# Patient Record
Sex: MALE | Race: WHITE | Employment: FULL TIME | URBAN - METROPOLITAN AREA
[De-identification: names, ages, dates, MRNs, and addresses within clinical notes are randomized per-mention and may not be internally consistent; named-entity substitution may affect disease eponyms.]

---

## 2024-03-29 ENCOUNTER — APPOINTMENT (OUTPATIENT)
Dept: GENERAL RADIOLOGY | Facility: HOSPITAL | Age: 54
End: 2024-03-29
Payer: COMMERCIAL

## 2024-03-29 ENCOUNTER — HOSPITAL ENCOUNTER (INPATIENT)
Facility: HOSPITAL | Age: 54
LOS: 3 days | Discharge: HOME | End: 2024-04-01
Attending: EMERGENCY MEDICINE | Admitting: INTERNAL MEDICINE
Payer: COMMERCIAL

## 2024-03-29 ENCOUNTER — APPOINTMENT (OUTPATIENT)
Dept: CT IMAGING | Facility: HOSPITAL | Age: 54
End: 2024-03-29
Attending: EMERGENCY MEDICINE
Payer: COMMERCIAL

## 2024-03-29 ENCOUNTER — APPOINTMENT (OUTPATIENT)
Dept: ULTRASOUND IMAGING | Facility: HOSPITAL | Age: 54
End: 2024-03-29
Attending: STUDENT IN AN ORGANIZED HEALTH CARE EDUCATION/TRAINING PROGRAM
Payer: COMMERCIAL

## 2024-03-29 DIAGNOSIS — R09.02 HYPOXIA: ICD-10-CM

## 2024-03-29 DIAGNOSIS — I26.99 OTHER ACUTE PULMONARY EMBOLISM WITHOUT ACUTE COR PULMONALE (HCC): Primary | ICD-10-CM

## 2024-03-29 LAB
ALBUMIN SERPL-MCNC: 3.1 G/DL (ref 3.4–5)
ALBUMIN/GLOB SERPL: 0.6 {RATIO} (ref 1–2)
ALP LIVER SERPL-CCNC: 60 U/L
ALT SERPL-CCNC: 23 U/L
ANION GAP SERPL CALC-SCNC: 6 MMOL/L (ref 0–18)
APTT PPP: 28.9 SECONDS (ref 23.3–35.6)
APTT PPP: 55.2 SECONDS (ref 23.3–35.6)
AST SERPL-CCNC: 20 U/L (ref 15–37)
BASOPHILS # BLD AUTO: 0.03 X10(3) UL (ref 0–0.2)
BASOPHILS NFR BLD AUTO: 0.4 %
BILIRUB SERPL-MCNC: 1.1 MG/DL (ref 0.1–2)
BUN BLD-MCNC: 9 MG/DL (ref 9–23)
CALCIUM BLD-MCNC: 8.9 MG/DL (ref 8.5–10.1)
CHLORIDE SERPL-SCNC: 106 MMOL/L (ref 98–112)
CO2 SERPL-SCNC: 29 MMOL/L (ref 21–32)
CREAT BLD-MCNC: 1.05 MG/DL
D DIMER PPP FEU-MCNC: 8.23 UG/ML FEU (ref ?–0.52)
EGFRCR SERPLBLD CKD-EPI 2021: 85 ML/MIN/1.73M2 (ref 60–?)
EOSINOPHIL # BLD AUTO: 0.23 X10(3) UL (ref 0–0.7)
EOSINOPHIL NFR BLD AUTO: 2.9 %
ERYTHROCYTE [DISTWIDTH] IN BLOOD BY AUTOMATED COUNT: 18.4 %
GLOBULIN PLAS-MCNC: 4.9 G/DL (ref 2.8–4.4)
GLUCOSE BLD-MCNC: 134 MG/DL (ref 70–99)
HCT VFR BLD AUTO: 38.5 %
HGB BLD-MCNC: 12.2 G/DL
IMM GRANULOCYTES # BLD AUTO: 0.04 X10(3) UL (ref 0–1)
IMM GRANULOCYTES NFR BLD: 0.5 %
LYMPHOCYTES # BLD AUTO: 1.52 X10(3) UL (ref 1–4)
LYMPHOCYTES NFR BLD AUTO: 19.2 %
MCH RBC QN AUTO: 25.1 PG (ref 26–34)
MCHC RBC AUTO-ENTMCNC: 31.7 G/DL (ref 31–37)
MCV RBC AUTO: 79.1 FL
MONOCYTES # BLD AUTO: 0.47 X10(3) UL (ref 0.1–1)
MONOCYTES NFR BLD AUTO: 5.9 %
NEUTROPHILS # BLD AUTO: 5.63 X10 (3) UL (ref 1.5–7.7)
NEUTROPHILS # BLD AUTO: 5.63 X10(3) UL (ref 1.5–7.7)
NEUTROPHILS NFR BLD AUTO: 71.1 %
NT-PROBNP SERPL-MCNC: 141 PG/ML (ref ?–125)
OSMOLALITY SERPL CALC.SUM OF ELEC: 293 MOSM/KG (ref 275–295)
PLATELET # BLD AUTO: 220 10(3)UL (ref 150–450)
POTASSIUM SERPL-SCNC: 3.6 MMOL/L (ref 3.5–5.1)
PROT SERPL-MCNC: 8 G/DL (ref 6.4–8.2)
RBC # BLD AUTO: 4.87 X10(6)UL
SODIUM SERPL-SCNC: 141 MMOL/L (ref 136–145)
TROPONIN I SERPL HS-MCNC: 6 NG/L
WBC # BLD AUTO: 7.9 X10(3) UL (ref 4–11)

## 2024-03-29 PROCEDURE — 93005 ELECTROCARDIOGRAM TRACING: CPT

## 2024-03-29 PROCEDURE — 93010 ELECTROCARDIOGRAM REPORT: CPT

## 2024-03-29 PROCEDURE — 85730 THROMBOPLASTIN TIME PARTIAL: CPT | Performed by: INTERNAL MEDICINE

## 2024-03-29 PROCEDURE — 80053 COMPREHEN METABOLIC PANEL: CPT | Performed by: EMERGENCY MEDICINE

## 2024-03-29 PROCEDURE — 85730 THROMBOPLASTIN TIME PARTIAL: CPT | Performed by: EMERGENCY MEDICINE

## 2024-03-29 PROCEDURE — 84484 ASSAY OF TROPONIN QUANT: CPT | Performed by: EMERGENCY MEDICINE

## 2024-03-29 PROCEDURE — 96374 THER/PROPH/DIAG INJ IV PUSH: CPT

## 2024-03-29 PROCEDURE — 71045 X-RAY EXAM CHEST 1 VIEW: CPT | Performed by: EMERGENCY MEDICINE

## 2024-03-29 PROCEDURE — 99285 EMERGENCY DEPT VISIT HI MDM: CPT

## 2024-03-29 PROCEDURE — 85379 FIBRIN DEGRADATION QUANT: CPT | Performed by: PHYSICIAN ASSISTANT

## 2024-03-29 PROCEDURE — 94660 CPAP INITIATION&MGMT: CPT

## 2024-03-29 PROCEDURE — 5A09357 ASSISTANCE WITH RESPIRATORY VENTILATION, LESS THAN 24 CONSECUTIVE HOURS, CONTINUOUS POSITIVE AIRWAY PRESSURE: ICD-10-PCS | Performed by: INTERNAL MEDICINE

## 2024-03-29 PROCEDURE — 71275 CT ANGIOGRAPHY CHEST: CPT | Performed by: EMERGENCY MEDICINE

## 2024-03-29 PROCEDURE — 83880 ASSAY OF NATRIURETIC PEPTIDE: CPT | Performed by: EMERGENCY MEDICINE

## 2024-03-29 PROCEDURE — 85025 COMPLETE CBC W/AUTO DIFF WBC: CPT | Performed by: EMERGENCY MEDICINE

## 2024-03-29 RX ORDER — SENNOSIDES 8.6 MG
17.2 TABLET ORAL NIGHTLY PRN
Status: DISCONTINUED | OUTPATIENT
Start: 2024-03-29 | End: 2024-04-01

## 2024-03-29 RX ORDER — ONDANSETRON 2 MG/ML
4 INJECTION INTRAMUSCULAR; INTRAVENOUS EVERY 4 HOURS PRN
Status: ACTIVE | OUTPATIENT
Start: 2024-03-29 | End: 2024-03-29

## 2024-03-29 RX ORDER — MORPHINE SULFATE 2 MG/ML
2 INJECTION, SOLUTION INTRAMUSCULAR; INTRAVENOUS EVERY 2 HOUR PRN
Status: DISCONTINUED | OUTPATIENT
Start: 2024-03-29 | End: 2024-04-01

## 2024-03-29 RX ORDER — PROCHLORPERAZINE EDISYLATE 5 MG/ML
5 INJECTION INTRAMUSCULAR; INTRAVENOUS EVERY 8 HOURS PRN
Status: DISCONTINUED | OUTPATIENT
Start: 2024-03-29 | End: 2024-04-01

## 2024-03-29 RX ORDER — HEPARIN SODIUM 1000 [USP'U]/ML
80 INJECTION, SOLUTION INTRAVENOUS; SUBCUTANEOUS ONCE
Status: COMPLETED | OUTPATIENT
Start: 2024-03-29 | End: 2024-03-29

## 2024-03-29 RX ORDER — ACETAMINOPHEN 500 MG
500 TABLET ORAL EVERY 4 HOURS PRN
Status: DISCONTINUED | OUTPATIENT
Start: 2024-03-29 | End: 2024-04-01

## 2024-03-29 RX ORDER — BISACODYL 10 MG
10 SUPPOSITORY, RECTAL RECTAL
Status: DISCONTINUED | OUTPATIENT
Start: 2024-03-29 | End: 2024-04-01

## 2024-03-29 RX ORDER — ONDANSETRON 2 MG/ML
4 INJECTION INTRAMUSCULAR; INTRAVENOUS EVERY 6 HOURS PRN
Status: DISCONTINUED | OUTPATIENT
Start: 2024-03-29 | End: 2024-04-01

## 2024-03-29 RX ORDER — POTASSIUM CHLORIDE 20 MEQ/1
40 TABLET, EXTENDED RELEASE ORAL EVERY 4 HOURS
Status: COMPLETED | OUTPATIENT
Start: 2024-03-29 | End: 2024-03-30

## 2024-03-29 RX ORDER — ASPIRIN 81 MG/1
324 TABLET, CHEWABLE ORAL ONCE
Status: COMPLETED | OUTPATIENT
Start: 2024-03-29 | End: 2024-03-29

## 2024-03-29 RX ORDER — MORPHINE SULFATE 4 MG/ML
4 INJECTION, SOLUTION INTRAMUSCULAR; INTRAVENOUS EVERY 2 HOUR PRN
Status: DISCONTINUED | OUTPATIENT
Start: 2024-03-29 | End: 2024-04-01

## 2024-03-29 RX ORDER — POLYETHYLENE GLYCOL 3350 17 G/17G
17 POWDER, FOR SOLUTION ORAL DAILY PRN
Status: DISCONTINUED | OUTPATIENT
Start: 2024-03-29 | End: 2024-04-01

## 2024-03-29 RX ORDER — SODIUM CHLORIDE 9 MG/ML
INJECTION, SOLUTION INTRAVENOUS CONTINUOUS
Status: ACTIVE | OUTPATIENT
Start: 2024-03-29 | End: 2024-03-29

## 2024-03-29 RX ORDER — ENEMA 19; 7 G/133ML; G/133ML
1 ENEMA RECTAL ONCE AS NEEDED
Status: DISCONTINUED | OUTPATIENT
Start: 2024-03-29 | End: 2024-04-01

## 2024-03-29 RX ORDER — MORPHINE SULFATE 2 MG/ML
1 INJECTION, SOLUTION INTRAMUSCULAR; INTRAVENOUS EVERY 2 HOUR PRN
Status: DISCONTINUED | OUTPATIENT
Start: 2024-03-29 | End: 2024-04-01

## 2024-03-29 RX ORDER — HEPARIN SODIUM AND DEXTROSE 10000; 5 [USP'U]/100ML; G/100ML
18 INJECTION INTRAVENOUS ONCE
Status: COMPLETED | OUTPATIENT
Start: 2024-03-29 | End: 2024-03-29

## 2024-03-29 RX ORDER — HEPARIN SODIUM AND DEXTROSE 10000; 5 [USP'U]/100ML; G/100ML
INJECTION INTRAVENOUS CONTINUOUS
Status: DISCONTINUED | OUTPATIENT
Start: 2024-03-29 | End: 2024-03-31

## 2024-03-29 NOTE — CONSULTS
[facility] Cardiology  Consultation Note      Faustino Bell Patient Status:  Emergency    1970 MRN [id number]   [facility] EMERGENCY DEPARTMENT Attending Costa Iqbal MD   Hosp Day # 0 PCP Arya Ojeda MD     Impression:  1. SOB x 2 wk--> acute pulmonary embolism  - CTA (3/29/24): bilateral distal main PA--> segmental branches.    - ECG: SR with RV strain, HS trop negative, pBNP <200  2. recent COVID infection  3. morbid obesity, LALO      Recommendations:  - PE: reviewed diagnosis; no signs of sub-massive/massive consequences, although there does appear to be a fair burden of clot bilaterally. Defer system or catheter-directed lytics at this time.   - TTE ordered.  - AC: heparin gtt--> DOAC on dc.   - hx notable for \"clotting disorder\" in mother--> hematology consultation for hypercoagulability work-up        History of Present Illness:  Faustino Bell is a 53 year old  seen in cardiology consultation for acute PE.  Hx morbid obesity, LALO.  Recent COVID infection 3-4 weeks ago; presents with 2 weeks of marked exertional dyspnea, most notable climbing stairs, today, accompanying dizziness. CTA demonstrates bilateral distal main PA--> segmental branches.  pBNP <200, HS trop negative, ECG SR without RV strain. Initially hypoxia 83% RA--> stabilized on nasal cannula.  Currently, comfortable at rest.  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      Medications:  Current Facility-Administered Medications   Medication Dose Route Frequency    heparin (Porcine) 1000 UNIT/ML injection - BOLUS IV 10,100 Units  80 Units/kg (Adjusted) Intravenous Once    heparin (Porcine) 53014 units/250 mL infusion ED (PE/DVT/THROMBUS) INITIAL DOSE  18 Units/kg/hr (Adjusted) Intravenous Once    heparin (Porcine) 64773 units/250mL infusion PE/DVT/THROMBUS CONTINUOUS  200-3,000 Units/hr Intravenous Continuous       Past Medical History:   Diagnosis  Date    Hypothyroid     Obstructive apnea 2021    Tameka HST AHI 33    Unspecified sleep apnea        Past Surgical History:   Procedure Laterality Date    HERNIA SURGERY         Family History  family history includes Diabetes in his maternal aunt, maternal grandmother, maternal uncle, and sister; Heart Disorder in his father; Hypertension in his father; renal failure in his father.    Social History   reports that he has never smoked. He has never used smokeless tobacco. He reports current drug use. He reports that he does not drink alcohol.     Allergies  No Known Allergies      Review of Systems:  Constitutional: negative for fevers  Eyes: negative for visual disturbance  Ears, nose, mouth, throat, and face: negative for epistaxis  Respiratory: negative for dyspnea on exertion  Cardiovascular: negative for chest pain  Gastrointestinal: negative for melena  Genitourinary:negative for hematuria  Hematologic/lymphatic: negative for bleeding  Musculoskeletal:negative for myalgias  Neurological: negative for dizziness and headaches  Endocrine: negative for temperature intolerance      Physical Exam:  Blood pressure 138/89, pulse 85, temperature 98.9 °F (37.2 °C), temperature source Temporal, resp. rate 23, height 73\", weight (!) 432 lb (196 kg), SpO2 95%.  Temp (24hrs), Av.9 °F (37.2 °C), Min:98.9 °F (37.2 °C), Max:98.9 °F (37.2 °C)    Wt Readings from Last 3 Encounters:   24 (!) 432 lb (196 kg)   21 (!) 470 lb (213.2 kg)   21 (!) 470 lb (213.2 kg)       General: Awake and alert; in no acute distress  HEENT: Extraocular movements are intact; sclerae are anicteric; scalp is atrauamatic; no thyromegaly  Neck: Supple; no JVD; no carotid bruits  Cardiac: Regular rate and regular rhythm; no murmurs/rubs/gallops are appreciated; PMI is non-displaced; there is no evidence of a sternal heave  Lungs: Clear to auscultation bilaterally; no accessory muscle use is noted  Abdomen: Soft, non-tender; bowel  sounds are normoactive; no hepatosplenomegaly  Extremities: No clubbing or cyanosis; moves all 4 extremities normally  Psychiatric: Normal mood and affect; answers questions appropriately  Dermatologic: No rashes; normal skin turgor    Diagnostic testing:    EKG: Normal sinus rhythm    Labs:   No results found for: \"PT\", \"INR\"     Lab Results   Component Value Date    WBC 7.9 03/29/2024    HGB 12.2 03/29/2024    HCT 38.5 03/29/2024    .0 03/29/2024    CREATSERUM 1.05 03/29/2024    BUN 9 03/29/2024     03/29/2024    K 3.6 03/29/2024     03/29/2024    CO2 29.0 03/29/2024     03/29/2024    CA 8.9 03/29/2024    ALB 3.1 03/29/2024    ALKPHO 60 03/29/2024    BILT 1.1 03/29/2024    TP 8.0 03/29/2024    AST 20 03/29/2024    ALT 23 03/29/2024    DDIMER 8.23 03/29/2024         Thank you for allowing our practice to participate in the care of your patient. Please do not hesitate to contact me if you have any questions.    Ye Penn MD  3/29/2024  4:53 PM

## 2024-03-29 NOTE — ED QUICK NOTES
Orders for admission, patient is aware of plan and ready to go upstairs. Any questions, please call ED RN Marlene at extension [phone].     Patient Covid vaccination status: Fully vaccinated     COVID Test Ordered in ED: None    COVID Suspicion at Admission: N/A    Running Infusions:    continuous dose heparin      23ml/hr    Mental Status/LOC at time of transport: a/ox4    Other pertinent information: none  CIWA score: N/A   NIH score:  N/A

## 2024-03-29 NOTE — H&P
[facility] Hospitalist H&P       CC:   Chief Complaint   Patient presents with    Shortness Of Breath    Chest Pain Angina        PCP: Arya Ojeda MD    History of Present Illness: Patient is a 53 year old male with PMH sig for obstructive sleep apnea on CPAP, hypothyroidism, morbid obesity who presented to the hospital for evaluation of shortness of breath.  Patient had COVID infection approximately 4 weeks ago.  He recovered without needing any medical intervention.  However, for the past week or so he has been feeling short of breath with minimal exertion which was unusual for him.  He normally has lymphedema and swelling in his legs but denies any calf pain or pain in his legs with walking.  No previous history of DVT or PE.  Denies any fevers chills chest pain palpitations at this time.  In the ER, patient's vitals were stable.  Labs with elevated dimer and slightly elevated proBNP.  CTA chest suggestive of bilateral PE.  Patient was started on heparin drip and admitted to the hospital for evaluation.  When I evaluated the patient in the ER, he was hemodynamically stable.  Laying flat, on room air, denied any chest pain.  Wife at bedside.      PMH  Past Medical History:   Diagnosis Date    Hypothyroid     Obstructive apnea 12/19/2021    [place] HST AHI 33    Unspecified sleep apnea         PSH  Past Surgical History:   Procedure Laterality Date    HERNIA SURGERY          ALL:  No Known Allergies     Home Medications:  No outpatient medications have been marked as taking for the 3/29/24 encounter (Hospital Encounter).         Soc Hx  Social History     Tobacco Use    Smoking status: Never    Smokeless tobacco: Never   Substance Use Topics    Alcohol use: No        Fam Hx  Family History   Problem Relation Age of Onset    Hypertension Father     Heart Disorder Father         MI final event    Other (renal failure) Father     Diabetes Maternal Grandmother     Diabetes Maternal Uncle     Diabetes Maternal Aunt      Diabetes Sister        Review of Systems  Comprehensive ROS reviewed and negative except for what's stated above.     OBJECTIVE:  /89   Pulse 85   Temp 98.9 °F (37.2 °C) (Temporal)   Resp 23   Ht 6' 1\" (1.854 m)   Wt (!) 432 lb (196 kg)   SpO2 95%   BMI 57.00 kg/m²   General:  Alert, no distress, morbidly obese gentleman in no acute distress   Head:  Normocephalic, without obvious abnormality, atraumatic.   Eyes:  Sclera anicteric, No conjunctival pallor, EOMs intact.    Nose: Nares normal. Septum midline. Mucosa normal. No drainage.   Throat: Lips, mucosa, and tongue normal. Teeth and gums normal.   Neck: Supple, symmetrical, trachea midline,   Lungs:   Clear to auscultation bilaterally. Normal effort   Chest wall:  No tenderness or deformity.   Heart:  Regular rate and rhythm, S1, S2 normal, no murmur, rub or gallop appreciated   Abdomen:   Soft, non-tender. Bowel sounds normal. No masses,  Non distended   Extremities: Extremities with venous static changes and lymphedema, no calf tenderness   Skin: Skin is dry and lower extremities   Neurologic: Normal strength, no focal deficit appreciated     Diagnostic Data:    CBC/Chem  Recent Labs   Lab 03/29/24  1409   WBC 7.9   HGB 12.2*   MCV 79.1*   .0       Recent Labs   Lab 03/29/24  1409      K 3.6      CO2 29.0   BUN 9   CREATSERUM 1.05   *   CA 8.9       Recent Labs   Lab 03/29/24  1409   ALT 23   AST 20   ALB 3.1*       No results for input(s): \"TROP\" in the last 168 hours.    CXR: image personally reviewed     Radiology: CT ANGIOGRAPHY, CHEST (CPT=71275)    Result Date: 3/29/2024  CONCLUSION:  1. Bilateral pulmonary emboli as detailed above.  Critical findings discussed with the ED MD, Dr. Sands's at approximately 1625 hours on 3/29/2024. Read back performed.     LOCATION:  [facility]   Dictated by (CST): Julianna Freeman MD on 3/29/2024 at 4:24 PM     Finalized by (CST): Julianna Freeman MD on 3/29/2024 at 4:36 PM       XR CHEST AP  PORTABLE  (CPT=71045)    Result Date: 3/29/2024  CONCLUSION:  Stable cardiac enlargement with mild interstitial opacities suggestive of mild edema.   LOCATION:  [facility]      Dictated by (CST): Bashir Maldonado MD on 3/29/2024 at 1:45 PM     Finalized by (CST): Bashir Maldonado MD on 3/29/2024 at 1:45 PM          ASSESSMENT / PLAN:      Patient is a 53 year old male with PMH sig for obstructive sleep apnea on CPAP, hypothyroidism, morbid obesity who presented to the hospital for evaluation of shortness of breath.     Dyspnea  Acute bilateral pulmonary embolism  - Likely provoked in the setting of recent COVID 19 infection  - CT with bilateral pulmonary emboli  - proBNP 141, troponin negative  - Currently hemodynamically stable, on room air  - Heparin drip  - 2D echo  - Venous Dopplers bilateral  - Pulmonology and cardiology on consult, appreciate recommendations    LALO  - On CPAP    Morbid obesity  - BMI 57  - Weight loss endeavors outpatient    FN:  - IVF: None  - Diet: Cardiac    DVT Prophy: SCD  Atrophy: Ambulate as tolerated  Lines: PIV    Dispo: Admit with telemetry  Outpatient records or previous hospital records reviewed.     Further recommendations pending patient's clinical course.  [facility] hospitalist to continue to follow patient while in house    Patient and/or patient's family given opportunity to ask questions and note understanding and agreeing with therapeutic plan as outlined    Thank You,  DO Marcy Ahmadi Hospitalist  Answering Service number: [phone]

## 2024-03-29 NOTE — ED PROVIDER NOTES
Patient Seen in: [facility] Emergency Department      History     Chief Complaint   Patient presents with    Shortness Of Breath    Chest Pain Angina     Stated Complaint: sob x 1 week, 83% on RA    Subjective:   HPI     this is a 53-year-old male who has a history of obstructive sleep apnea he said he who has had a recent COVID 4 weeks ago.  He felt better and now he has increasing shortness of breath.  He states every time he walks more than a few feet then he gets really short of breath and he gets chest discomfort.  The patient states that he has had no fevers recently.  He has had no wheezing.  He states that he does have sleep apnea and is on no oxygen at during the day.  He states that he is increasingly short of breath over the last couple days.  He does have history hypothyroidism he is got a history of sleep apnea.  He denies any new leg swelling.  He has chronic leg edema.  He denies any history of blood clots she denies any calf pain denies any cough or fevers.    Objective:   Past Medical History:   Diagnosis Date    Hypothyroid     Obstructive apnea 12/19/2021    Tameka HST AHI 33    Unspecified sleep apnea               Past Surgical History:   Procedure Laterality Date    HERNIA SURGERY                  Social History     Socioeconomic History    Marital status:    Tobacco Use    Smoking status: Never    Smokeless tobacco: Never   Vaping Use    Vaping Use: Never used   Substance and Sexual Activity    Alcohol use: No    Drug use: Yes     Comment: thc gummies for sleep              Review of Systems    Positive for stated complaint: sob x 1 week, 83% on RA  Other systems are as noted in HPI.  Constitutional and vital signs reviewed.      All other systems reviewed and negative except as noted above.    Physical Exam     ED Triage Vitals [03/29/24 1315]   BP (!) 148/95   Pulse 94   Resp 22   Temp 98.9 °F (37.2 °C)   Temp src Temporal   SpO2 93 %   O2 Device None (Room air)       Current:BP  151/82   Pulse 82   Temp 98.9 °F (37.2 °C) (Temporal)   Resp 21   Ht 185.4 cm (6' 1\")   Wt (!) 196 kg   SpO2 95%   BMI 57.00 kg/m²         Physical Exam    General: .  The patient is got an elevated BMI.  Not short of breath at this present time.  The patient is in no respiratory distress. The patient is not septic or toxic    HEENT: Atraumatic, conjunctiva are not pale.  There is no icterus.  Oral mucosa Is wet.  No facial trauma.  The neck is supple.    LUNGS: Clear to auscultation, there is no wheezing or retraction.  No crackles.    CV: Cardiovascular is regular without murmurs or rubs.    ABD: The abdomen is soft nondistended nontender.  There is no rebound.  There is no guarding.    EXT: There is good pulses bilaterally.  There is no calf tenderness.  There is no rash noted.  There is chronic leg swelling but there is no edema.  Patient has no calf tenderness.  NEURO: Alert and oriented x3.  Muscle strength and sensory exam is grossly normal.  And the patient is neurologically intact with no focal findings.      ED Course     Labs Reviewed   COMP METABOLIC PANEL (14) - Abnormal; Notable for the following components:       Result Value    Glucose 134 (*)     Albumin 3.1 (*)     Globulin  4.9 (*)     A/G Ratio 0.6 (*)     All other components within normal limits   D-DIMER - Abnormal; Notable for the following components:    D-Dimer 8.23 (*)     All other components within normal limits   PRO BETA NATRIURETIC PEPTIDE - Abnormal; Notable for the following components:    Pro-Beta Natriuretic Peptide 141 (*)     All other components within normal limits   CBC W/ DIFFERENTIAL - Abnormal; Notable for the following components:    HGB 12.2 (*)     HCT 38.5 (*)     MCV 79.1 (*)     MCH 25.1 (*)     All other components within normal limits   TROPONIN I HIGH SENSITIVITY - Normal   CBC WITH DIFFERENTIAL WITH PLATELET    Narrative:     The following orders were created for panel order CBC With Differential With  Platelet.  Procedure                               Abnormality         Status                     ---------                               -----------         ------                     CBC W/ DIFFERENTIAL[[id number]]          Abnormal            Final result                 Please view results for these tests on the individual orders.   PTT, ACTIVATED                    The patient was placed on monitors, IV was started, blood was drawn.       He has exertional dyspnea and shortness of breath and he was noted to be hypoxic when he first arrived here.      Workup was done to rule out pneumonia, pneumothorax, congestive heart failure, acute pulmonary syndrome.  Including PE  MDM      The EKG shows normal sinus rhythm.  There is no acute ST elevations or ischemic findings.  The rest of the EKG including rate rhythm axis and intervals I agree with the EKG report . The rate is 91    Admission disposition: 3/29/2024  4:30 PM       Troponin was negative, the patient's comprehensive was grossly negative, the patient CBC shows a white count that was normal.  The patient's D-dimer is 8.23.        I personally reviewed the radiographs and my individual interpretation shows    Chest x-ray shows portable study shows no obvious findings of heart failure.  CT of the chest shows pulmonary embolism bilaterally.  Also reviewed official report and it shows      CT ANGIOGRAPHY, CHEST (CPT=71275)    Result Date: 3/29/2024  PROCEDURE:  CT ANGIOGRAPHY, CHEST (CPT=71275)  COMPARISON:  [facility] XR, XR CHEST AP PORTABLE  (CPT=71045), 3/29/2024, 1:25 PM.  INDICATIONS:  sob x 1 week, 83% on RA  TECHNIQUE:  IV contrast-enhanced multislice CT angiography is performed through the pulmonary arterial anatomy. 3D volume renderings are generated.  Dose reduction techniques were used. Dose information is transmitted to the ACR (American College of Radiology) NRDR (National Radiology Data Registry) which includes the Dose Index Registry.  PATIENT STATED  HISTORY:(As transcribed by Technologist)  sob 83% on room air   CONTRAST USED:  100 mLcc of Isovue 370  FINDINGS:  VASCULATURE:  Filling defects involve the distal main pulmonary arteries bilaterally which extend into the segmental branches.  No right ventricular strain appreciated.  THORACIC AORTA:  No aneurysm or visible dissection.  LUNGS:  No focal consolidation.  Image degradation due to motion involves the mid to inferior lung.  MEDIASTINUM:  No adenopathy.  Thyroid gland is enlarged a 2.6 cm low-density nodule within the left lobe, thyroid sonogram can be performed for further evaluation as clinically indicated. JOSE:  No mass or adenopathy.  CARDIAC:  No enlargement, pericardial thickening, or significant coronary artery calcification. PLEURA:  No mass or effusion.  CHEST WALL:  No mass or axillary adenopathy.  LIMITED ABDOMEN:  Limited images of the upper abdomen are unremarkable.  BONES:  Degenerative change of the spine.  No fracture.             CONCLUSION:  1. Bilateral pulmonary emboli as detailed above.  Critical findings discussed with the ED MD, Dr. Cordon at approximately 1625 hours on 3/29/2024. Read back performed.     LOCATION:  [facility]   Dictated by (CST): Julianna Freeman MD on 3/29/2024 at 4:24 PM     Finalized by (CST): Julianna Freeman MD on 3/29/2024 at 4:36 PM       XR CHEST AP PORTABLE  (CPT=71045)    Result Date: 3/29/2024  PROCEDURE:  XR CHEST AP PORTABLE  (CPT=71045)  TECHNIQUE:  AP chest radiograph was obtained.  COMPARISON:  None.  INDICATIONS:  sob x 1 week, 83% on RA  PATIENT STATED HISTORY: (As transcribed by Technologist)  Patient stated having shortness of breath.    FINDINGS:  Stable cardiac enlargement with mild interstitial opacities.  No pleural effusions.  No pneumothorax.  No focal consolidation.            CONCLUSION:  Stable cardiac enlargement with mild interstitial opacities suggestive of mild edema.   LOCATION:  [facility]      Dictated by (CST): Bashir Maldonado MD on 3/29/2024 at  1:45 PM     Finalized by (CST): Bashir Maldonado MD on 3/29/2024 at 1:45 PM           This case was discussed with did duly hospitalist Dr. Perez, I also discussed this case with Dr. Luevano.  He reviewed the films the patient did not have findings of right heart strain no elevated troponin or BNP felt the patient need to be heparinized but not not did not feel at this present time the patient was not at TNK candidate or needed thrombectomy.  As there is no findings of right heart strain but will need an echo.  Discussed this case with  Cardiology.  Cardiology did come in and see the patient patient will be admitted for further evaluation    Patient has no history of bleeding disorders.  Patient was heparinized per protocol    I did go back back and reexamined he has no complaints of chest pain or shortness of breath on repeat exam.    A total of 35 minutes of critical care time (exclusive of billable procedures) was administered to manage the patient's respiratory instability due to his current embolism, hypoxia.  This involved direct patient intervention, complex decision making, and/or extensive discussions with the patient, family, and clinical staff.         Medical Decision Making      Disposition and Plan     Clinical Impression:  1. Other acute pulmonary embolism without acute cor pulmonale (HCC)    2. Hypoxia         Disposition:  Admit  3/29/2024  4:30 pm    Follow-up:  No follow-up provider specified.        Medications Prescribed:  There are no discharge medications for this patient.                        Hospital Problems       Present on Admission  Date Reviewed: 11/17/2021            ICD-10-CM Noted POA    * (Principal) Other acute pulmonary embolism without acute cor pulmonale (HCC) I26.99 3/29/2024 Unknown

## 2024-03-29 NOTE — ED INITIAL ASSESSMENT (HPI)
C/o exertional shortness of breath. States he had covid 4 weeks ago, felt better and then now short of breath with a \"bruised\" feeling in his sternum. \"I feel like I have just run 10 miles after taking a few steps\". No current fevers. Reports he has respiratory issues as a baseline, \"I have slept sitting up for the last 4 years\".     Sats 83% on RA when first up to the triage desk. 93% when sitting in the w/c for a few mins.

## 2024-03-30 ENCOUNTER — APPOINTMENT (OUTPATIENT)
Dept: ULTRASOUND IMAGING | Facility: HOSPITAL | Age: 54
End: 2024-03-30
Attending: STUDENT IN AN ORGANIZED HEALTH CARE EDUCATION/TRAINING PROGRAM
Payer: COMMERCIAL

## 2024-03-30 ENCOUNTER — APPOINTMENT (OUTPATIENT)
Dept: CV DIAGNOSTICS | Facility: HOSPITAL | Age: 54
End: 2024-03-30
Attending: INTERNAL MEDICINE
Payer: COMMERCIAL

## 2024-03-30 LAB
ANION GAP SERPL CALC-SCNC: 8 MMOL/L (ref 0–18)
APTT PPP: 67.8 SECONDS (ref 23.3–35.6)
BASOPHILS # BLD AUTO: 0.05 X10(3) UL (ref 0–0.2)
BASOPHILS NFR BLD AUTO: 0.6 %
BUN BLD-MCNC: 8 MG/DL (ref 9–23)
C DIFF TOX B STL QL: NEGATIVE
CALCIUM BLD-MCNC: 8.7 MG/DL (ref 8.5–10.1)
CHLORIDE SERPL-SCNC: 110 MMOL/L (ref 98–112)
CO2 SERPL-SCNC: 24 MMOL/L (ref 21–32)
CREAT BLD-MCNC: 0.78 MG/DL
EGFRCR SERPLBLD CKD-EPI 2021: 107 ML/MIN/1.73M2 (ref 60–?)
EOSINOPHIL # BLD AUTO: 0.28 X10(3) UL (ref 0–0.7)
EOSINOPHIL NFR BLD AUTO: 3.4 %
ERYTHROCYTE [DISTWIDTH] IN BLOOD BY AUTOMATED COUNT: 18.3 %
GLUCOSE BLD-MCNC: 111 MG/DL (ref 70–99)
HCT VFR BLD AUTO: 36.2 %
HGB BLD-MCNC: 11.6 G/DL
IMM GRANULOCYTES # BLD AUTO: 0.05 X10(3) UL (ref 0–1)
IMM GRANULOCYTES NFR BLD: 0.6 %
LYMPHOCYTES # BLD AUTO: 2.46 X10(3) UL (ref 1–4)
LYMPHOCYTES NFR BLD AUTO: 30 %
MCH RBC QN AUTO: 25.5 PG (ref 26–34)
MCHC RBC AUTO-ENTMCNC: 32 G/DL (ref 31–37)
MCV RBC AUTO: 79.6 FL
MONOCYTES # BLD AUTO: 0.41 X10(3) UL (ref 0.1–1)
MONOCYTES NFR BLD AUTO: 5 %
NEUTROPHILS # BLD AUTO: 4.95 X10 (3) UL (ref 1.5–7.7)
NEUTROPHILS # BLD AUTO: 4.95 X10(3) UL (ref 1.5–7.7)
NEUTROPHILS NFR BLD AUTO: 60.4 %
OSMOLALITY SERPL CALC.SUM OF ELEC: 293 MOSM/KG (ref 275–295)
PLATELET # BLD AUTO: 215 10(3)UL (ref 150–450)
POTASSIUM SERPL-SCNC: 3.9 MMOL/L (ref 3.5–5.1)
POTASSIUM SERPL-SCNC: 3.9 MMOL/L (ref 3.5–5.1)
RBC # BLD AUTO: 4.55 X10(6)UL
SODIUM SERPL-SCNC: 142 MMOL/L (ref 136–145)
WBC # BLD AUTO: 8.2 X10(3) UL (ref 4–11)

## 2024-03-30 PROCEDURE — 93970 EXTREMITY STUDY: CPT | Performed by: STUDENT IN AN ORGANIZED HEALTH CARE EDUCATION/TRAINING PROGRAM

## 2024-03-30 PROCEDURE — 85730 THROMBOPLASTIN TIME PARTIAL: CPT | Performed by: STUDENT IN AN ORGANIZED HEALTH CARE EDUCATION/TRAINING PROGRAM

## 2024-03-30 PROCEDURE — 93306 TTE W/DOPPLER COMPLETE: CPT | Performed by: INTERNAL MEDICINE

## 2024-03-30 PROCEDURE — 85025 COMPLETE CBC W/AUTO DIFF WBC: CPT | Performed by: STUDENT IN AN ORGANIZED HEALTH CARE EDUCATION/TRAINING PROGRAM

## 2024-03-30 PROCEDURE — 94799 UNLISTED PULMONARY SVC/PX: CPT

## 2024-03-30 PROCEDURE — 87493 C DIFF AMPLIFIED PROBE: CPT | Performed by: INTERNAL MEDICINE

## 2024-03-30 PROCEDURE — 80048 BASIC METABOLIC PNL TOTAL CA: CPT | Performed by: STUDENT IN AN ORGANIZED HEALTH CARE EDUCATION/TRAINING PROGRAM

## 2024-03-30 PROCEDURE — 84132 ASSAY OF SERUM POTASSIUM: CPT | Performed by: STUDENT IN AN ORGANIZED HEALTH CARE EDUCATION/TRAINING PROGRAM

## 2024-03-30 RX ORDER — NYSTATIN 100000 U/G
CREAM TOPICAL 2 TIMES DAILY
Status: DISCONTINUED | OUTPATIENT
Start: 2024-03-30 | End: 2024-04-01

## 2024-03-30 NOTE — PROGRESS NOTES
DM Hospitalist Progress Note     CC: Hospital Follow up    PCP: Arya Ojeda MD       Assessment/Plan:     Principal Problem:    Other acute pulmonary embolism without acute cor pulmonale (HCC)  Active Problems:    Hypoxia          Patient is a 53 year old male with PMH sig for obstructive sleep apnea on CPAP, hypothyroidism, morbid obesity who presented to the hospital for evaluation of shortness of breath.      Dyspnea-improved  Acute bilateral pulmonary embolism  Acute bilateral DVT  - Likely provoked in the setting of recent COVID 19 infection  - CT with bilateral pulmonary emboli  - proBNP 141, troponin negative  - Currently hemodynamically stable, on room air  - Heparin drip, appreciate input on p.o. options given morbid obesity  - 2D echo-pending  - Venous Dopplers bilateral-suggestive of bilateral distal DVTs  - Pulmonology and cardiology on consult, appreciate recommendations     LALO  - On CPAP     Morbid obesity  - BMI 57  - Weight loss endeavors outpatient     Lymphedema with skin changes  - Nystatin twice daily for 5 to 7 days given concurrent fungal infection    FN:  - IVF: None  - Diet: Cardiac     DVT Prophy: SCD  Atrophy: Ambulate as tolerated  Lines: PIV     Dispo: Pending clinical status  Outpatient records or previous hospital records reviewed.      Further recommendations pending patient's clinical course.  [facility] [place] [facility] to continue to follow patient while in house     Patient and/or patient's family given opportunity to ask questions and note understanding and agreeing with therapeutic plan as outlined     Thank You,  DO Marcy Ahmadi Hospitalist  Answering Service number: [phone]     Subjective:     No CP,  or N/V.  Dyspnea has improved.  Patient has been ambulatory in the room.  No calf tenderness.  Nervous about his current condition.  Remains on room air.  Pain is controlled.    OBJECTIVE:    Blood pressure 120/62, pulse 83, temperature 97.9 °F (36.6 °C),  temperature source Oral, resp. rate 22, height 6' 1\" (1.854 m), weight (!) 432 lb (196 kg), SpO2 94%.    Temp:  [97.9 °F (36.6 °C)-98.9 °F (37.2 °C)] 97.9 °F (36.6 °C)  Pulse:  [82-94] 83  Resp:  [14-23] 22  BP: (120-163)/(62-98) 120/62  SpO2:  [93 %-96 %] 94 %      Intake/Output:    Intake/Output Summary (Last 24 hours) at 3/30/2024 0654  Last data filed at 3/30/2024 0025  Gross per 24 hour   Intake 120 ml   Output --   Net 120 ml       Last 3 Weights   03/29/24 2009 (!) 432 lb (196 kg)   03/29/24 1315 (!) 432 lb (196 kg)   11/17/21 1610 (!) 470 lb (213.2 kg)   05/17/21 1521 (!) 470 lb (213.2 kg)       Exam   Gen: No acute distress, alert and oriented x3, no focal neurologic deficits, morbidly obese  Heent: NC AT, mucous memb clear, neck supple  Pulm: Lungs clear, normal respiratory effort  CV: Heart with regular rate and rhythm, no peripheral edema  Abd: Abdomen soft, nontender, nondistended, bowel sounds present  MSK: Full range of motion in extremities, no clubbing, no cyanosis  Skin: Bilateral lower extremity lymphedema, extremely dry skin with signs of infection  Neuro: AO*3, motor intact, no sensory deficits  Psyc: appropriate mood and affect      Data Review:       Labs:     Recent Labs   Lab 03/29/24  1409   RBC 4.87   HGB 12.2*   HCT 38.5*   MCV 79.1*   MCH 25.1*   MCHC 31.7   RDW 18.4   NEPRELIM 5.63   WBC 7.9   .0         Recent Labs   Lab 03/29/24  1409   *   BUN 9   CREATSERUM 1.05   EGFRCR 85   CA 8.9      K 3.6      CO2 29.0       Recent Labs   Lab 03/29/24  1409   ALT 23   AST 20   ALB 3.1*         Imaging:  CT ANGIOGRAPHY, CHEST (CPT=71275)    Result Date: 3/29/2024  CONCLUSION:  1. Bilateral pulmonary emboli as detailed above.  Critical findings discussed with the ED MD, Dr. Sands's at approximately 1625 hours on 3/29/2024. Read back performed.     LOCATION:  [facility]   Dictated by (CST): Julianna Freeman MD on 3/29/2024 at 4:24 PM     Finalized by (CST): Julianna Freeman MD on  3/29/2024 at 4:36 PM       XR CHEST AP PORTABLE  (CPT=71045)    Result Date: 3/29/2024  CONCLUSION:  Stable cardiac enlargement with mild interstitial opacities suggestive of mild edema.   LOCATION:  [facility]      Dictated by (CST): Bashir Maldonado MD on 3/29/2024 at 1:45 PM     Finalized by (CST): Bashir Maldonado MD on 3/29/2024 at 1:45 PM          Meds:        continuous dose heparin 2,500 Units/hr (03/30/24 0304)     acetaminophen, morphINE **OR** morphINE **OR** morphINE, polyethylene glycol (PEG 3350), sennosides, bisacodyl, fleet enema, ondansetron, prochlorperazine

## 2024-03-30 NOTE — PLAN OF CARE
NURSING ADMISSION NOTE      Patient admitted via Cart  Oriented to room.  Safety precautions initiated.  Bed in low position.  Call light in reach.    Assumed care of pt around 2000.  A/o x4. CPAP overnight. NSR on tele.   C/o BLE cramping PRN Morphine x1.   Heparin gtt infusing per PE/DVT protocol.   K replaced.  Echo and BLE US ordered.   Resting in the bed w/ safety measures provided.       Problem: RESPIRATORY - ADULT  Goal: Achieves optimal ventilation and oxygenation  Description: INTERVENTIONS:  - Assess for changes in respiratory status  - Assess for changes in mentation and behavior  - Position to facilitate oxygenation and minimize respiratory effort  - Oxygen supplementation based on oxygen saturation or ABGs  - Provide Smoking Cessation handout, if applicable  - Encourage broncho-pulmonary hygiene including cough, deep breathe, Incentive Spirometry  - Assess the need for suctioning and perform as needed  - Assess and instruct to report SOB or any respiratory difficulty  - Respiratory Therapy support as indicated  - Manage/alleviate anxiety  - Monitor for signs/symptoms of CO2 retention  Outcome: Progressing     Problem: METABOLIC/FLUID AND ELECTROLYTES - ADULT  Goal: Electrolytes maintained within normal limits  Description: INTERVENTIONS:  - Monitor labs and rhythm and assess patient for signs and symptoms of electrolyte imbalances  - Administer electrolyte replacement as ordered  - Monitor response to electrolyte replacements, including rhythm and repeat lab results as appropriate  - Fluid restriction as ordered  - Instruct patient on fluid and nutrition restrictions as appropriate  Outcome: Progressing     Problem: HEMATOLOGIC - ADULT  Goal: Maintains hematologic stability  Description: INTERVENTIONS  - Assess for signs and symptoms of bleeding or hemorrhage  - Monitor labs and vital signs for trends  - Administer supportive blood products/factors, fluids and medications as ordered and  appropriate  - Administer supportive blood products/factors as ordered and appropriate  Outcome: Progressing  Goal: Free from bleeding injury  Description: (Example usage: patient with low platelets)  INTERVENTIONS:  - Avoid intramuscular injections, enemas and rectal medication administration  - Ensure safe mobilization of patient  - Hold pressure on venipuncture sites to achieve adequate hemostasis  - Assess for signs and symptoms of internal bleeding  - Monitor lab trends  - Patient is to report abnormal signs of bleeding to staff  - Avoid use of toothpicks and dental floss  - Use electric shaver for shaving  - Use soft bristle tooth brush  - Limit straining and forceful nose blowing  Outcome: Progressing

## 2024-03-30 NOTE — CONSULTS
[facility]    Faustino Bell Patient Status:  Inpatient    1970 MRN [id number]   Location [facility] 7NE-A Attending Jerry Tong, DO   Hosp Day # 1 PCP Arya Ojeda MD     Date of Admission: 3/29/2024  1:16 PM  Admission Diagnosis: Hypoxia [R09.02]  Other acute pulmonary embolism without acute cor pulmonale (HCC) [I26.99]  Reason for Consult: PE, SOB     History of Present Illness: 54 y/o with h/o hypothyroidism, obesity, LALO and recent COVID one month ago, presented to ED with c/o severe SOB, dizziness, progressive over two week period.  ER w/u including CTA revealed PE.  Pt had mild hypoxia requiring supp O2.  - pt has chronic lymphedema and did not experience worsening swelling/LE pain/trauma.  No recent travel hx.  No personal h/o prior VTE but does have family h/o clotting on mother's side.  - otherwise, denies n/v/d/dysuria/hemoptysis/f/c.     Past Medical History:   Diagnosis Date    Arrhythmia     hospitalized  fast heart rate    Hypothyroid     Obstructive apnea 2021    Guly HST AHI 33    Prediabetes     Unspecified sleep apnea       Past Surgical History:   Procedure Laterality Date    HERNIA SURGERY        No Known Allergies     Social History:   reports that he has never smoked. He has never used smokeless tobacco. He reports current drug use. He reports that he does not drink alcohol.      Family History:  Family History   Problem Relation Age of Onset    Hypertension Father     Heart Disorder Father         MI final event    Other (renal failure) Father     Cancer Mother     Diabetes Mother     Diabetes Sister     Cancer Sister     Diabetes Maternal Aunt     Diabetes Maternal Uncle          Home Medications:  No outpatient medications have been marked as taking for the 3/29/24 encounter (Hospital Encounter).        Current Medications:    Current Facility-Administered Medications:     heparin (Porcine) 26014 units/250mL infusion PE/DVT/THROMBUS CONTINUOUS,  200-3,000 Units/hr, Intravenous, Continuous    acetaminophen (Tylenol Extra Strength) tab 500 mg, 500 mg, Oral, Q4H PRN    morphINE PF 2 MG/ML injection 1 mg, 1 mg, Intravenous, Q2H PRN **OR** morphINE PF 2 MG/ML injection 2 mg, 2 mg, Intravenous, Q2H PRN **OR** morphINE PF 4 MG/ML injection 4 mg, 4 mg, Intravenous, Q2H PRN    polyethylene glycol (PEG 3350) (Miralax) 17 g oral packet 17 g, 17 g, Oral, Daily PRN    sennosides (Senokot) tab 17.2 mg, 17.2 mg, Oral, Nightly PRN    bisacodyl (Dulcolax) 10 MG rectal suppository 10 mg, 10 mg, Rectal, Daily PRN    fleet enema (Fleet) 7-19 GM/118ML rectal enema 133 mL, 1 enema, Rectal, Once PRN    ondansetron (Zofran) 4 MG/2ML injection 4 mg, 4 mg, Intravenous, Q6H PRN    prochlorperazine (Compazine) 10 MG/2ML injection 5 mg, 5 mg, Intravenous, Q8H PRN     REVIEW OF SYSTEMS:   As listed in HPI, otherwise ten point ROS is negative.     OBJECTIVE:  Patient Vitals for the past 24 hrs:   BP Temp Temp src Pulse Resp SpO2 Height Weight   03/30/24 0800 141/83 98 °F (36.7 °C) Oral 96 22 94 % -- --   03/30/24 0420 120/62 97.9 °F (36.6 °C) Oral 83 22 94 % -- --   03/30/24 0025 125/87 98.3 °F (36.8 °C) Oral 94 21 -- -- --   03/29/24 2017 (!) 157/98 98.4 °F (36.9 °C) Oral 88 21 94 % -- --   03/29/24 2009 -- -- -- -- -- -- -- (!) 432 lb (196 kg)   03/29/24 1930 (!) 144/94 -- -- 92 22 95 % -- --   03/29/24 1900 (!) 163/87 -- -- 87 18 95 % -- --   03/29/24 1800 (!) 139/91 -- -- 87 18 94 % -- --   03/29/24 1645 151/82 -- -- 82 21 95 % -- --   03/29/24 1515 138/89 -- -- 85 23 95 % -- --   03/29/24 1415 -- -- -- 89 16 96 % -- --   03/29/24 1400 141/84 -- -- 83 14 96 % -- --   03/29/24 1315 (!) 148/95 98.9 °F (37.2 °C) Temporal 94 22 93 % 185.4 cm (6' 1\") (!) 432 lb (196 kg)     O2 requirement: on room air    Wt Readings from Last 3 Encounters:   03/29/24 (!) 432 lb (196 kg)   11/17/21 (!) 470 lb (213.2 kg)   05/17/21 (!) 470 lb (213.2 kg)        I/O last 3 completed shifts:  In: 120  [P.O.:120]  Out: -   I/O this shift:  In: 120 [P.O.:120]  Out: -     General appearance: alert, appears stated age, cooperative, and no distress  Head: Normocephalic, without obvious abnormality, atraumatic  Neck: no adenopathy, no carotid bruit, and supple, symmetrical, trachea midline  Back: symmetric, no curvature. ROM normal. No CVA tenderness.  Lungs: clear to auscultation bilaterally  Chest wall: no tenderness  Heart: regular rate and rhythm  Abdomen: soft, non-tender; bowel sounds normal; no masses,  no organomegaly  Extremities: edema + c/w lymphedema  and venous stasis changes  Pulses: 2+ and symmetric  Skin: Skin color, texture, turgor normal. No rashes or lesions     Lab Results   Component Value Date    WBC 8.2 03/30/2024    RBC 4.55 03/30/2024    HGB 11.6 03/30/2024    HCT 36.2 03/30/2024    MCV 79.6 03/30/2024    MCH 25.5 03/30/2024    MCHC 32.0 03/30/2024    RDW 18.3 03/30/2024    .0 03/30/2024     Lab Results   Component Value Date     03/30/2024    K 3.9 03/30/2024    K 3.9 03/30/2024     03/30/2024    CO2 24.0 03/30/2024    BUN 8 03/30/2024    CREATSERUM 0.78 03/30/2024     03/30/2024    CA 8.7 03/30/2024     Lab Results   Component Value Date     03/30/2024    K 3.9 03/30/2024    K 3.9 03/30/2024     03/30/2024    CO2 24.0 03/30/2024    BUN 8 03/30/2024    CREATSERUM 0.78 03/30/2024     03/30/2024    CA 8.7 03/30/2024    ALKPHO 60 03/29/2024    ALT 23 03/29/2024    AST 20 03/29/2024    BILT 1.1 03/29/2024    ALB 3.1 03/29/2024    TP 8.0 03/29/2024     No results found for: \"PT\", \"INR\"   Troponin: 6  pBNP: 141    Imaging: personally reviewed. Per EMR.     ASSESSMENT/PLAN:  Acute hypoxic resp insufficiency- due to large B PE's   - cont with supp O2 and wean as tolerated- weaned down to room air this morning  - bronchial hygiene.  IS to bedside  Acute bilateral PE's.  Submassive in nature with large clot burden.    - echo pending.  BNP/troponin normal  -  LE doppler pending- if there is a large proximal DVT, would consider temporary IVC filter  - cont with hep gtt  - anticoagulation with DOAC may be challenging bc of morbid obesity with BMI of 57; will have pharmacy weigh in and help with dosing.  - cards consulted; catheter directed therapy on hold given that troponin was normal and pt is normotensive.  Await echo results.  H/o LALO- on CPAP  Dispo - Full code  - will follow    Juventino Ulloa MD  3/30/2024  8:49 AM

## 2024-03-30 NOTE — PLAN OF CARE
Assumed care around 0730  Aox4, denies pain and sob, RA, NSR  CPAP @noc  Doppler comlete, ECHO completed, see results  Hep gtt infusing at therapeutic range.  Ambulates to bathroom  Call light within reach      Problem: RESPIRATORY - ADULT  Goal: Achieves optimal ventilation and oxygenation  Description: INTERVENTIONS:  - Assess for changes in respiratory status  - Assess for changes in mentation and behavior  - Position to facilitate oxygenation and minimize respiratory effort  - Oxygen supplementation based on oxygen saturation or ABGs  - Provide Smoking Cessation handout, if applicable  - Encourage broncho-pulmonary hygiene including cough, deep breathe, Incentive Spirometry  - Assess the need for suctioning and perform as needed  - Assess and instruct to report SOB or any respiratory difficulty  - Respiratory Therapy support as indicated  - Manage/alleviate anxiety  - Monitor for signs/symptoms of CO2 retention  Outcome: Progressing     Problem: METABOLIC/FLUID AND ELECTROLYTES - ADULT  Goal: Electrolytes maintained within normal limits  Description: INTERVENTIONS:  - Monitor labs and rhythm and assess patient for signs and symptoms of electrolyte imbalances  - Administer electrolyte replacement as ordered  - Monitor response to electrolyte replacements, including rhythm and repeat lab results as appropriate  - Fluid restriction as ordered  - Instruct patient on fluid and nutrition restrictions as appropriate  Outcome: Progressing     Problem: HEMATOLOGIC - ADULT  Goal: Maintains hematologic stability  Description: INTERVENTIONS  - Assess for signs and symptoms of bleeding or hemorrhage  - Monitor labs and vital signs for trends  - Administer supportive blood products/factors, fluids and medications as ordered and appropriate  - Administer supportive blood products/factors as ordered and appropriate  Outcome: Progressing  Goal: Free from bleeding injury  Description: (Example usage: patient with low  platelets)  INTERVENTIONS:  - Avoid intramuscular injections, enemas and rectal medication administration  - Ensure safe mobilization of patient  - Hold pressure on venipuncture sites to achieve adequate hemostasis  - Assess for signs and symptoms of internal bleeding  - Monitor lab trends  - Patient is to report abnormal signs of bleeding to staff  - Avoid use of toothpicks and dental floss  - Use electric shaver for shaving  - Use soft bristle tooth brush  - Limit straining and forceful nose blowing  Outcome: Progressing

## 2024-03-30 NOTE — PROGRESS NOTES
[facility]  Progress Note      Faustino Bell Patient Status:  Emergency    1970 MRN [id number]   [facility] EMERGENCY DEPARTMENT Attending Costa Iqbal MD   Hosp Day # 1 PCP Arya Ojeda MD     Impression:  1. SOB x 2 wk--> acute pulmonary embolism  - CTA (3/29/24): bilateral distal main PA--> segmental branches.    - ECG: SR with RV strain, HS trop negative, pBNP <200  2. recent COVID infection  3. morbid obesity, LALO      Recommendations:  - PE: reviewed diagnosis; no signs of sub-massive/massive consequences, although there does appear to be a fair burden of clot bilaterally. Defer system or catheter-directed lytics at this time.   - await echo  - IV heparin and convert to DOAC prior to discharge  - hx notable for \"clotting disorder\" in mother--> hematology consultation for hypercoagulability work-up        SUBJECTIVE:    No dyspnea at rest. No chest pain        History of Present Illness:  Faustino Bell is a 53 year old  seen in cardiology consultation for acute PE.  Hx morbid obesity, LALO.  Recent COVID infection 3-4 weeks ago; presents with 2 weeks of marked exertional dyspnea, most notable climbing stairs, today, accompanying dizziness. CTA demonstrates bilateral distal main PA--> segmental branches.  pBNP <200, HS trop negative, ECG SR without RV strain. Initially hypoxia 83% RA--> stabilized on nasal cannula.  Currently, comfortable at rest.  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      Medications:        Past Medical History:   Diagnosis Date    Arrhythmia     hospitalized  fast heart rate    Hypothyroid     Obstructive apnea 2021    Guly HST AHI 33    Prediabetes     Unspecified sleep apnea        Past Surgical History:   Procedure Laterality Date    HERNIA SURGERY         Family History  family history includes Cancer in his mother and sister; Diabetes in his maternal aunt, maternal  uncle, mother, and sister; Heart Disorder in his father; Hypertension in his father; renal failure in his father.    Social History   reports that he has never smoked. He has never used smokeless tobacco. He reports current drug use. He reports that he does not drink alcohol.     Allergies  No Known Allergies      Physical Exam:  Blood pressure 120/62, pulse 83, temperature 97.9 °F (36.6 °C), temperature source Oral, resp. rate 22, height 6' 1\" (1.854 m), weight (!) 432 lb (196 kg), SpO2 94%.  Temp (24hrs), Av.4 °F (36.9 °C), Min:97.9 °F (36.6 °C), Max:98.9 °F (37.2 °C)    Wt Readings from Last 3 Encounters:   24 (!) 432 lb (196 kg)   21 (!) 470 lb (213.2 kg)   21 (!) 470 lb (213.2 kg)       General: Awake and alert; in no acute distress  HEENT: Extraocular movements are intact; sclerae are anicteric; scalp is atrauamatic  Neck: Supple  Cardiac: Regular rate and regular rhythm; no murmurs/rubs/gallops are appreciated  Lungs: Clear to auscultation bilaterally; no accessory muscle use is noted  Abdomen: Soft, obese  Extremities: No clubbing or cyanosis; moves all 4 extremities normally  Psychiatric: Normal mood and affect; answers questions appropriately  Dermatologic: No rashes; normal skin turgor    Diagnostic testing:    EKG: Normal sinus rhythm    Labs:   No results found for: \"PT\", \"INR\"     Lab Results   Component Value Date    WBC 7.9 2024    HGB 12.2 2024    HCT 38.5 2024    .0 2024    CREATSERUM 1.05 2024    BUN 9 2024     2024    K 3.6 2024     2024    CO2 29.0 2024     2024    CA 8.9 2024    ALB 3.1 2024    ALKPHO 60 2024    BILT 1.1 2024    TP 8.0 2024    AST 20 2024    ALT 23 2024    PTT 55.2 2024    DDIMER 8.23 2024     Recent Labs   Lab 24  1409   *   BUN 9   CREATSERUM 1.05   EGFRCR 85   CA 8.9      K 3.6       CO2 29.0     CTA chest:  CONCLUSION:    1. Bilateral pulmonary emboli as detailed above.         TELE: SR

## 2024-03-31 LAB
ANION GAP SERPL CALC-SCNC: 5 MMOL/L (ref 0–18)
APTT PPP: 65.6 SECONDS (ref 23.3–35.6)
ATRIAL RATE: 91 BPM
BASOPHILS # BLD AUTO: 0.05 X10(3) UL (ref 0–0.2)
BASOPHILS NFR BLD AUTO: 0.7 %
BUN BLD-MCNC: 9 MG/DL (ref 9–23)
CALCIUM BLD-MCNC: 8.9 MG/DL (ref 8.5–10.1)
CHLORIDE SERPL-SCNC: 107 MMOL/L (ref 98–112)
CO2 SERPL-SCNC: 27 MMOL/L (ref 21–32)
CREAT BLD-MCNC: 0.81 MG/DL
EGFRCR SERPLBLD CKD-EPI 2021: 106 ML/MIN/1.73M2 (ref 60–?)
EOSINOPHIL # BLD AUTO: 0.21 X10(3) UL (ref 0–0.7)
EOSINOPHIL NFR BLD AUTO: 3 %
ERYTHROCYTE [DISTWIDTH] IN BLOOD BY AUTOMATED COUNT: 17.8 %
GLUCOSE BLD-MCNC: 111 MG/DL (ref 70–99)
HCT VFR BLD AUTO: 37.5 %
HGB BLD-MCNC: 11.4 G/DL
IMM GRANULOCYTES # BLD AUTO: 0.03 X10(3) UL (ref 0–1)
IMM GRANULOCYTES NFR BLD: 0.4 %
INR BLD: 1.09 (ref 0.8–1.2)
LYMPHOCYTES # BLD AUTO: 1.84 X10(3) UL (ref 1–4)
LYMPHOCYTES NFR BLD AUTO: 26.2 %
MCH RBC QN AUTO: 25.3 PG (ref 26–34)
MCHC RBC AUTO-ENTMCNC: 30.4 G/DL (ref 31–37)
MCV RBC AUTO: 83.3 FL
MONOCYTES # BLD AUTO: 0.46 X10(3) UL (ref 0.1–1)
MONOCYTES NFR BLD AUTO: 6.5 %
NEUTROPHILS # BLD AUTO: 4.44 X10 (3) UL (ref 1.5–7.7)
NEUTROPHILS # BLD AUTO: 4.44 X10(3) UL (ref 1.5–7.7)
NEUTROPHILS NFR BLD AUTO: 63.2 %
OSMOLALITY SERPL CALC.SUM OF ELEC: 287 MOSM/KG (ref 275–295)
P AXIS: 35 DEGREES
P-R INTERVAL: 170 MS
PLATELET # BLD AUTO: 208 10(3)UL (ref 150–450)
POTASSIUM SERPL-SCNC: 3.5 MMOL/L (ref 3.5–5.1)
POTASSIUM SERPL-SCNC: 4.4 MMOL/L (ref 3.5–5.1)
PROTHROMBIN TIME: 14.2 SECONDS (ref 11.6–14.8)
Q-T INTERVAL: 364 MS
QRS DURATION: 92 MS
QTC CALCULATION (BEZET): 447 MS
R AXIS: 56 DEGREES
RBC # BLD AUTO: 4.5 X10(6)UL
SODIUM SERPL-SCNC: 139 MMOL/L (ref 136–145)
T AXIS: 46 DEGREES
VENTRICULAR RATE: 91 BPM
WBC # BLD AUTO: 7 X10(3) UL (ref 4–11)

## 2024-03-31 PROCEDURE — 85025 COMPLETE CBC W/AUTO DIFF WBC: CPT | Performed by: STUDENT IN AN ORGANIZED HEALTH CARE EDUCATION/TRAINING PROGRAM

## 2024-03-31 PROCEDURE — 85520 HEPARIN ASSAY: CPT | Performed by: INTERNAL MEDICINE

## 2024-03-31 PROCEDURE — 85730 THROMBOPLASTIN TIME PARTIAL: CPT | Performed by: STUDENT IN AN ORGANIZED HEALTH CARE EDUCATION/TRAINING PROGRAM

## 2024-03-31 PROCEDURE — 85610 PROTHROMBIN TIME: CPT | Performed by: INTERNAL MEDICINE

## 2024-03-31 PROCEDURE — 80048 BASIC METABOLIC PNL TOTAL CA: CPT | Performed by: STUDENT IN AN ORGANIZED HEALTH CARE EDUCATION/TRAINING PROGRAM

## 2024-03-31 PROCEDURE — 84132 ASSAY OF SERUM POTASSIUM: CPT | Performed by: STUDENT IN AN ORGANIZED HEALTH CARE EDUCATION/TRAINING PROGRAM

## 2024-03-31 RX ORDER — ENOXAPARIN SODIUM 100 MG/ML
200 INJECTION SUBCUTANEOUS EVERY 12 HOURS SCHEDULED
Status: DISCONTINUED | OUTPATIENT
Start: 2024-03-31 | End: 2024-04-01

## 2024-03-31 RX ORDER — POTASSIUM CHLORIDE 20 MEQ/1
40 TABLET, EXTENDED RELEASE ORAL EVERY 4 HOURS
Status: COMPLETED | OUTPATIENT
Start: 2024-03-31 | End: 2024-03-31

## 2024-03-31 RX ORDER — WARFARIN SODIUM 7.5 MG/1
7.5 TABLET ORAL
Status: COMPLETED | OUTPATIENT
Start: 2024-03-31 | End: 2024-03-31

## 2024-03-31 NOTE — PROGRESS NOTES
[facility] Hospitalist Progress Note     CC: Hospital Follow up    PCP: Arya Ojeda MD       Assessment/Plan:     Principal Problem:    Other acute pulmonary embolism without acute cor pulmonale (HCC)  Active Problems:    Hypoxia          Patient is a 53 year old male with PMH sig for obstructive sleep apnea on CPAP, hypothyroidism, morbid obesity who presented to the hospital for evaluation of shortness of breath.      Dyspnea-improved  Acute bilateral pulmonary embolism  Acute bilateral DVT  - Likely provoked in the setting of recent COVID 19 infection  - CT with bilateral pulmonary emboli  - proBNP 141, troponin negative  - Currently hemodynamically stable, on room air  - Status post heparin drip, now transition to Coumadin with Lovenox bridge, patient will need prescriptions for Lovenox and Coumadin.  Will need set up with Coumadin clinic.  Discussed INR goal of 2-3.  - Discussed with pharmacy, other options are limited given patient's BMI  - 2D echo-reviewed, normal EF, no RV strain  - Venous Dopplers bilateral-suggestive of bilateral distal DVTs, hold off on IVC filter  - Recommend follow-up with hematology in 4 to 6 weeks to further determine duration of treatment.  - Pulmonology and cardiology on consult, recommendations reviewed     LALO  - On CPAP     Morbid obesity  - BMI 57  - Weight loss endeavors outpatient     Lymphedema with skin changes  - Nystatin twice daily for 5 to 7 days given concurrent fungal infection    FN:  - IVF: None  - Diet: Cardiac     DVT Prophy: SCD, Lovenox/Coumadin  Atrophy: Ambulate as tolerated  Lines: PIV     Dispo: Pending clinical status  Outpatient records or previous hospital records reviewed.      Further recommendations pending patient's clinical course.  [facility] hospitalist to continue to follow patient while in house     Patient and/or patient's family given opportunity to ask questions and note understanding and agreeing with therapeutic plan as outlined     Thank  Ten,  DO Marcy Ahmadi Hospitalist  Answering Service number: [phone]     Subjective:     No CP,  or N/V.  Laying in bed.  Breathing has improved.  No new symptoms.  Discussed Coumadin and Lovenox bridging.  Answered all questions.  RN to provide Lovenox teaching..    OBJECTIVE:    Blood pressure 138/85, pulse 79, temperature 97.5 °F (36.4 °C), temperature source Oral, resp. rate 18, height 6' 1\" (1.854 m), weight (!) 432 lb (196 kg), SpO2 90%.    Temp:  [97.4 °F (36.3 °C)-98.2 °F (36.8 °C)] 97.5 °F (36.4 °C)  Pulse:  [79-99] 79  Resp:  [18-23] 18  BP: (108-146)/(67-86) 138/85  SpO2:  [90 %-94 %] 90 %      Intake/Output:    Intake/Output Summary (Last 24 hours) at 3/31/2024 1417  Last data filed at 3/31/2024 0900  Gross per 24 hour   Intake 480 ml   Output --   Net 480 ml       Last 3 Weights   03/29/24 2009 (!) 432 lb (196 kg)   03/29/24 1315 (!) 432 lb (196 kg)   11/17/21 1610 (!) 470 lb (213.2 kg)   05/17/21 1521 (!) 470 lb (213.2 kg)       Exam   Gen: No acute distress, alert and oriented x3, no focal neurologic deficits, morbidly obese  Heent: NC AT, mucous memb clear, neck supple  Pulm: Lungs clear, normal respiratory effort  CV: Heart with regular rate and rhythm, no peripheral edema  Abd: Abdomen soft, nontender, nondistended, bowel sounds present  MSK: Full range of motion in extremities, no clubbing, no cyanosis  Skin: Bilateral lower extremity lymphedema, extremely dry skin with signs of infection  Neuro: AO*3, motor intact, no sensory deficits  Psyc: appropriate mood and affect      Data Review:       Labs:     Recent Labs   Lab 03/29/24  1409 03/30/24  0610 03/31/24  0534   RBC 4.87 4.55 4.50   HGB 12.2* 11.6* 11.4*   HCT 38.5* 36.2* 37.5*   MCV 79.1* 79.6* 83.3   MCH 25.1* 25.5* 25.3*   MCHC 31.7 32.0 30.4*   RDW 18.4 18.3 17.8   NEPRELIM 5.63 4.95 4.44   WBC 7.9 8.2 7.0   .0 215.0 208.0         Recent Labs   Lab 03/29/24  1409 03/30/24  0610 03/31/24  0534   * 111* 111*    BUN 9 8* 9   CREATSERUM 1.05 0.78 0.81   EGFRCR 85 107 106   CA 8.9 8.7 8.9    142 139   K 3.6 3.9  3.9 3.5    110 107   CO2 29.0 24.0 27.0       Recent Labs   Lab 03/29/24  1409   ALT 23   AST 20   ALB 3.1*         Imaging:  US VENOUS DOPPLER LEG BILAT - DIAG IMG (CPT=93970)    Result Date: 3/30/2024  CONCLUSION:  1. There is acute partially obstructing deep venous thrombus within the distal right popliteal vein and 1 of the 2 proximal posterior tibial veins.  There is occlusive thrombus in 1 of the 2 right mid posterior tibial veins.  There is also DVT within the  left calf with occlusive thrombus in 1 of the 2 left posterior tibial veins midportion and adjacent partial occlusive thrombus in the left mid posterior tibial vein.    The critical value results were called to the floor nurse, Eduin at 1113 hours on  3/30/2024. Result read back was performed.  LOCATION:  [id number]   Dictated by ([facility]): Papo Plaza MD on 3/30/2024 at 11:11 AM     Finalized by ([facility]): Papo Plaza MD on 3/30/2024 at 11:14 AM       CT ANGIOGRAPHY, CHEST (CPT=71275)    Result Date: 3/29/2024  CONCLUSION:  1. Bilateral pulmonary emboli as detailed above.  Critical findings discussed with the ED MD, Dr. Sands's at approximately 1625 hours on 3/29/2024. Read back performed.     LOCATION:  [facility]   Dictated by ([facility]): Julianna Freeman MD on 3/29/2024 at 4:24 PM     Finalized by ([facility]): Julianna Freeman MD on 3/29/2024 at 4:36 PM       XR CHEST AP PORTABLE  (CPT=71045)    Result Date: 3/29/2024  CONCLUSION:  Stable cardiac enlargement with mild interstitial opacities suggestive of mild edema.   LOCATION:  [facility]      Dictated by ([facility]): Bashir Maldonado MD on 3/29/2024 at 1:45 PM     Finalized by ([facility]): Bashir Maldonado MD on 3/29/2024 at 1:45 PM          Meds:      enoxaparin  200 mg Subcutaneous 2 times per day    warfarin  7.5 mg Oral Once at night    nystatin   Topical BID         acetaminophen, morphINE **OR** morphINE **OR**  morphINE, polyethylene glycol (PEG 3350), sennosides, bisacodyl, fleet enema, ondansetron, prochlorperazine

## 2024-03-31 NOTE — PROGRESS NOTES
[facility]    Faustino Bell Patient Status:  Inpatient    1970 MRN [id number]   Location [facility] 7NE-A Attending Jerry Tong,    Hosp Day # 2 PCP Arya Ojeda MD     SUBJECTIVE:doing well. No acute events     OBJECTIVE:  /70 (BP Location: Left arm)   Pulse 79   Temp 97.4 °F (36.3 °C) (Oral)   Resp 18   Ht 185.4 cm (6' 1\")   Wt (!) 432 lb (196 kg)   SpO2 91%   BMI 57.00 kg/m²   O2 requirement: on room air     I/O last 3 completed shifts:  In: 480 [P.O.:480]  Out: -   No intake/output data recorded.     Current Medications:   Current Facility-Administered Medications:     nystatin (Mycostatin) 100,000 Units/g cream, , Topical, BID    heparin (Porcine) 76435 units/250mL infusion PE/DVT/THROMBUS CONTINUOUS, 200-3,000 Units/hr, Intravenous, Continuous    acetaminophen (Tylenol Extra Strength) tab 500 mg, 500 mg, Oral, Q4H PRN    morphINE PF 2 MG/ML injection 1 mg, 1 mg, Intravenous, Q2H PRN **OR** morphINE PF 2 MG/ML injection 2 mg, 2 mg, Intravenous, Q2H PRN **OR** morphINE PF 4 MG/ML injection 4 mg, 4 mg, Intravenous, Q2H PRN    polyethylene glycol (PEG 3350) (Miralax) 17 g oral packet 17 g, 17 g, Oral, Daily PRN    sennosides (Senokot) tab 17.2 mg, 17.2 mg, Oral, Nightly PRN    bisacodyl (Dulcolax) 10 MG rectal suppository 10 mg, 10 mg, Rectal, Daily PRN    fleet enema (Fleet) 7-19 GM/118ML rectal enema 133 mL, 1 enema, Rectal, Once PRN    ondansetron (Zofran) 4 MG/2ML injection 4 mg, 4 mg, Intravenous, Q6H PRN    prochlorperazine (Compazine) 10 MG/2ML injection 5 mg, 5 mg, Intravenous, Q8H PRN     General appearance: alert, appears stated age, and cooperative  Lungs: clear to auscultation bilaterally  Heart: regular rate and rhythm  Abdomen: soft, non-tender; bowel sounds normal; no masses,  no organomegaly  Extremities: edema +1 and venous stasis dermatitis noted     Lab Results   Component Value Date    WBC 7.0 2024    RBC 4.50 2024    HGB 11.4  03/31/2024    HCT 37.5 03/31/2024    MCV 83.3 03/31/2024    MCH 25.3 03/31/2024    MCHC 30.4 03/31/2024    RDW 17.8 03/31/2024    .0 03/31/2024     Lab Results   Component Value Date     03/31/2024    K 3.5 03/31/2024     03/31/2024    CO2 27.0 03/31/2024    BUN 9 03/31/2024    CREATSERUM 0.81 03/31/2024     03/31/2024    CA 8.9 03/31/2024     No results found for: \"PT\", \"INR\"       Imaging: US LE:  CONCLUSION:    1. There is acute partially obstructing deep venous thrombus within the distal right popliteal vein and 1 of the 2 proximal posterior tibial veins.  There is occlusive thrombus in 1 of the 2 right mid posterior tibial veins.  There is also DVT within the left calf with occlusive thrombus in 1 of the 2 left posterior tibial veins midportion and adjacent partial occlusive thrombus in the left mid posterior tibial vein.        ASSESSMENT/PLAN:  Acute hypoxic resp insufficiency- due to large B PE's   - currently on room air  - bronchial hygiene.  IS to bedside  Acute bilateral PE's.   large clot burden.    - echo w/o signs of RV strain.  BNP/troponin normal  - LE doppler + for bilateral distal LE DVTs  - hold on IVC filter  - cont with hep gtt  - transition to DOAC today, dosing per pharmacy reccs  - cards following; catheter directed therapy deferred given that troponin was normal and pt is normotensive.  echo results also wnl.  H/o LALO- on CPAP  Dispo - Full code  - will follow    Juventino Ulloa MD  3/31/2024  7:09 AM

## 2024-03-31 NOTE — PLAN OF CARE
Assumed care of pt around 1900.  A/o x4. CPAP overnight. NSR on tele.  Heparin gtt infusing per PE/DVT protocol. Next PTT in w/ AM labs.  PRN Tylenol given for headache w/ adequate relief.   Scheduled Nystatin for BLE flaky/redness.  DVT education sheet provided to pt.   Up ad mckeznie.  Resting in bed w/ call light within reach.  Plan for transition to DOAC per consults.

## 2024-03-31 NOTE — PROGRESS NOTES
[facility] Dosing Service:  Enoxaparin Dosing  Faustino Bell is a 53 year old patient for whom pharmacy is dosing enoxaparin for treatment of Treatment of pulmonary embolism. Other anticoagulants include warfarin starting tonight; currently on heparin drip.  .  Pharmacy was consulted to dose by Dr Ulloa  Wt Readings from Last 1 Encounters:   03/29/24 (!) 196 kg (432 lb)     Last 3 Available Creatinine:   Recent Labs     03/29/24  1409 03/30/24  0610 03/31/24  0534   CREATSERUM 1.05 0.78 0.81     Last 3 Available Platelets:   Recent Labs     03/29/24  1409 03/30/24  0610 03/31/24  0534   .0 215.0 208.0     Latest INR: No results for input(s): \"INR\" in the last 72 hours.    Based on above   Start enoxaparin 200 mg (1 mg/kg)subcutaneous every 12 hours. Discontinue heparin after first Lovenox dose  Draw creatinine and platelets every 3 days per protocol, if not ordered more frequently. Anti-Xa level to be drawn 4 hours after second lovenox dose  Pharmacy will continue to follow.  Thank you for allowing us to participate.  We appreciate the opportunity to assist in the care of this patient.  Isamar Britt PharmD  3/31/2024  8:04 AM

## 2024-03-31 NOTE — DISCHARGE INSTRUCTIONS
You will need routine INR checked with Coumadin clinic.  Please call Coumadin clinic and follow instructions for follow-up. Check INR in two days    [place] [facility]:   [address]  [place] [address] [phone]

## 2024-03-31 NOTE — PLAN OF CARE
Assumed care around 0730  Aox4, denies sob, pain and lightheadedness  RA, NSR on tele  Hep gtt discontinued  Lovenox 1st dose administered. Coumadin to be given tonight.   AC therapy and diet handouts given.  All questions answered.   Call light within access.     Problem: RESPIRATORY - ADULT  Goal: Achieves optimal ventilation and oxygenation  Description: INTERVENTIONS:  - Assess for changes in respiratory status  - Assess for changes in mentation and behavior  - Position to facilitate oxygenation and minimize respiratory effort  - Oxygen supplementation based on oxygen saturation or ABGs  - Provide Smoking Cessation handout, if applicable  - Encourage broncho-pulmonary hygiene including cough, deep breathe, Incentive Spirometry  - Assess the need for suctioning and perform as needed  - Assess and instruct to report SOB or any respiratory difficulty  - Respiratory Therapy support as indicated  - Manage/alleviate anxiety  - Monitor for signs/symptoms of CO2 retention  Outcome: Progressing     Problem: METABOLIC/FLUID AND ELECTROLYTES - ADULT  Goal: Electrolytes maintained within normal limits  Description: INTERVENTIONS:  - Monitor labs and rhythm and assess patient for signs and symptoms of electrolyte imbalances  - Administer electrolyte replacement as ordered  - Monitor response to electrolyte replacements, including rhythm and repeat lab results as appropriate  - Fluid restriction as ordered  - Instruct patient on fluid and nutrition restrictions as appropriate  Outcome: Progressing     Problem: HEMATOLOGIC - ADULT  Goal: Maintains hematologic stability  Description: INTERVENTIONS  - Assess for signs and symptoms of bleeding or hemorrhage  - Monitor labs and vital signs for trends  - Administer supportive blood products/factors, fluids and medications as ordered and appropriate  - Administer supportive blood products/factors as ordered and appropriate  Outcome: Progressing     Problem: HEMATOLOGIC -  ADULT  Goal: Free from bleeding injury  Description: (Example usage: patient with low platelets)  INTERVENTIONS:  - Avoid intramuscular injections, enemas and rectal medication administration  - Ensure safe mobilization of patient  - Hold pressure on venipuncture sites to achieve adequate hemostasis  - Assess for signs and symptoms of internal bleeding  - Monitor lab trends  - Patient is to report abnormal signs of bleeding to staff  - Avoid use of toothpicks and dental floss  - Use electric shaver for shaving  - Use soft bristle tooth brush  - Limit straining and forceful nose blowing  Outcome: Progressing

## 2024-03-31 NOTE — PROGRESS NOTES
[facility] Pharmacy Dosing Service  Warfarin (Coumadin) Initial Dosing    Faustino Bell is a 53 year old patient for whom pharmacy has been consulted to dose warfarin (COUMADIN) for Treatment of pulmonary embolism by Dr. Ulloa.  Based on this indication, goal INR is 2-3.    Pertinent Patient Medical History:  large bilateral PEs; BMI=57  Potential Drug Interactions:  lovenox    Latest INR:   Recent Labs     03/31/24  0534   INR 1.09       Other Anticoagulants:  lovenox treatment dose  Home regimen (if applicable):  new start  Date/Time last dose was given (if applicable): new start    Based on above -  1.  For today, Give warfarin (COUMADIN) 7.5 mg at 2100 tonight    2.  PT/INR ordered daily while on warfarin    3.  Pharmacy will continue to follow.  We appreciate the opportunity to assist in the care of this patient.    Isamar Britt PharmD  3/31/2024  8:49 AM

## 2024-03-31 NOTE — PROGRESS NOTES
[facility]  Progress Note      Faustino Bell Patient Status:  Emergency    1970 MRN [id number]   [facility] EMERGENCY DEPARTMENT Attending Costa Iqbal MD   Hosp Day # 2 PCP Arya Ojeda MD     Impression:  1. SOB x 2 wk--> acute pulmonary embolism  - CTA (3/29/24): bilateral distal main PA--> segmental branches.    - ECG: SR with RV strain, HS trop negative, pBNP <200  2. recent COVID infection  3. morbid obesity, LALO      Recommendations:  - PE: Defer system or catheter-directed lytics at this time.   - echo reviewed  - IV heparin and convert to DOAC vs warfarin (very high BMI) prior to discharge  - hx notable for \"clotting disorder\" in mother--> hematology consultation for hypercoagulability work-up        SUBJECTIVE:    No dyspnea at rest. No chest pain. Feels better,        History of Present Illness:  Faustino Bell is a 53 year old  seen in cardiology consultation for acute PE.  Hx morbid obesity, LALO.  Recent COVID infection 3-4 weeks ago; presents with 2 weeks of marked exertional dyspnea, most notable climbing stairs, today, accompanying dizziness. CTA demonstrates bilateral distal main PA--> segmental branches.  pBNP <200, HS trop negative, ECG SR without RV strain. Initially hypoxia 83% RA--> stabilized on nasal cannula.  Currently, comfortable at rest.  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      Medications:        Past Medical History:   Diagnosis Date    Arrhythmia     hospitalized  fast heart rate    Hypothyroid     Obstructive apnea 2021    [place] HST AHI 33    Prediabetes     Unspecified sleep apnea        Past Surgical History:   Procedure Laterality Date    HERNIA SURGERY         Family History  family history includes Cancer in his mother and sister; Diabetes in his maternal aunt, maternal uncle, mother, and sister; Heart Disorder in his father; Hypertension in his father;  renal failure in his father.    Social History   reports that he has never smoked. He has never used smokeless tobacco. He reports current drug use. He reports that he does not drink alcohol.     Allergies  No Known Allergies      Physical Exam:  Blood pressure 108/70, pulse 79, temperature 97.4 °F (36.3 °C), temperature source Oral, resp. rate 18, height 6' 1\" (1.854 m), weight (!) 432 lb (196 kg), SpO2 91%.  Temp (24hrs), Av.9 °F (36.6 °C), Min:97.4 °F (36.3 °C), Max:98.3 °F (36.8 °C)    Wt Readings from Last 3 Encounters:   24 (!) 432 lb (196 kg)   21 (!) 470 lb (213.2 kg)   21 (!) 470 lb (213.2 kg)       General: Awake and alert; in no acute distress  HEENT: Extraocular movements are intact; sclerae are anicteric; scalp is atrauamatic  Neck: Supple  Cardiac: Regular rate and regular rhythm; no murmurs/rubs/gallops are appreciated  Lungs: Clear to auscultation bilaterally; no accessory muscle use is noted  Abdomen: Soft, obese  Extremities: No clubbing or cyanosis; moves all 4 extremities normally  Psychiatric: Normal mood and affect; answers questions appropriately  Dermatologic: No rashes; normal skin turgor    Diagnostic testing:    EKG: Normal sinus rhythm    Labs:   No results found for: \"PT\", \"INR\"     Lab Results   Component Value Date    WBC 7.0 2024    HGB 11.4 2024    HCT 37.5 2024    .0 2024    CREATSERUM 0.81 2024    BUN 9 2024     2024    K 3.5 2024     2024    CO2 27.0 2024     2024    CA 8.9 2024    PTT 65.6 2024     Recent Labs   Lab 24  1409 24  0610 24  0534   * 111* 111*   BUN 9 8* 9   CREATSERUM 1.05 0.78 0.81   EGFRCR 85 107 106   CA 8.9 8.7 8.9    142 139   K 3.6 3.9  3.9 3.5    110 107   CO2 29.0 24.0 27.0     CTA chest:  CONCLUSION:    1. Bilateral pulmonary emboli as detailed above.         TELE: SR      ECHO:  1. Left  ventricle: The cavity size was normal. Wall thickness was at the      upper limits of normal. The estimated ejection fraction was 55-60%, by      visual assessment.   2. Right ventricle: The cavity size was normal. Systolic function was      normal.   Impressions:  No previous study was available for comparison.

## 2024-04-01 VITALS
RESPIRATION RATE: 18 BRPM | BODY MASS INDEX: 41.75 KG/M2 | OXYGEN SATURATION: 93 % | WEIGHT: 315 LBS | SYSTOLIC BLOOD PRESSURE: 150 MMHG | TEMPERATURE: 98 F | HEIGHT: 73 IN | DIASTOLIC BLOOD PRESSURE: 96 MMHG | HEART RATE: 92 BPM

## 2024-04-01 LAB
APTT PPP: 35.4 SECONDS (ref 23.3–35.6)
CREAT BLD-MCNC: 0.86 MG/DL
EGFRCR SERPLBLD CKD-EPI 2021: 104 ML/MIN/1.73M2 (ref 60–?)
ERYTHROCYTE [DISTWIDTH] IN BLOOD BY AUTOMATED COUNT: 18.6 %
HCT VFR BLD AUTO: 42.2 %
HGB BLD-MCNC: 13 G/DL
INR BLD: 1.06 (ref 0.8–1.2)
INR BLD: 1.1 (ref 0.8–1.2)
MCH RBC QN AUTO: 25.3 PG (ref 26–34)
MCHC RBC AUTO-ENTMCNC: 30.8 G/DL (ref 31–37)
MCV RBC AUTO: 82.1 FL
PLATELET # BLD AUTO: 201 10(3)UL (ref 150–450)
PLATELET # BLD AUTO: 225 10(3)UL (ref 150–450)
PROTHROMBIN TIME: 13.9 SECONDS (ref 11.6–14.8)
PROTHROMBIN TIME: 14.2 SECONDS (ref 11.6–14.8)
RBC # BLD AUTO: 5.14 X10(6)UL
UFH PPP CHRO-ACNC: 0.69 IU/ML
WBC # BLD AUTO: 7 X10(3) UL (ref 4–11)

## 2024-04-01 PROCEDURE — 85610 PROTHROMBIN TIME: CPT | Performed by: INTERNAL MEDICINE

## 2024-04-01 PROCEDURE — 85730 THROMBOPLASTIN TIME PARTIAL: CPT | Performed by: STUDENT IN AN ORGANIZED HEALTH CARE EDUCATION/TRAINING PROGRAM

## 2024-04-01 PROCEDURE — 85049 AUTOMATED PLATELET COUNT: CPT | Performed by: INTERNAL MEDICINE

## 2024-04-01 PROCEDURE — 82565 ASSAY OF CREATININE: CPT | Performed by: INTERNAL MEDICINE

## 2024-04-01 PROCEDURE — 85027 COMPLETE CBC AUTOMATED: CPT | Performed by: INTERNAL MEDICINE

## 2024-04-01 RX ORDER — NYSTATIN 100000 U/G
1 CREAM TOPICAL 2 TIMES DAILY
Qty: 30 G | Refills: 2 | Status: SHIPPED | OUTPATIENT
Start: 2024-04-01

## 2024-04-01 RX ORDER — ENOXAPARIN SODIUM 100 MG/ML
200 INJECTION SUBCUTANEOUS EVERY 12 HOURS SCHEDULED
Qty: 20 EACH | Refills: 0 | Status: SHIPPED | OUTPATIENT
Start: 2024-04-01

## 2024-04-01 RX ORDER — WARFARIN SODIUM 7.5 MG/1
7.5 TABLET ORAL
Status: DISCONTINUED | OUTPATIENT
Start: 2024-04-01 | End: 2024-04-01

## 2024-04-01 RX ORDER — WARFARIN SODIUM 7.5 MG/1
7.5 TABLET ORAL DAILY
Qty: 30 TABLET | Refills: 0 | Status: SHIPPED | OUTPATIENT
Start: 2024-04-01 | End: 2024-05-01

## 2024-04-01 RX ORDER — WARFARIN SODIUM 7.5 MG/1
7.5 TABLET ORAL NIGHTLY
Status: CANCELLED | OUTPATIENT
Start: 2024-04-01

## 2024-04-01 NOTE — PLAN OF CARE
Assumed pt care @ 1930   Pt A/Ox4, NSR on tele, & on RA   Pt denies Cx pain or SOB  Lovenox education provided   Comfort & safety precautions maintained   Pt updated on plan of care

## 2024-04-01 NOTE — CONSULTS
[facility] Pharmacy Dosing Service  Warfarin (Coumadin) Subsequent Dosing    Faustino Bell is a 53 year old patient for whom pharmacy is dosing warfarin (Coumadin). Goal INR is 2-3    Recent Labs   Lab 03/31/24  0534 04/01/24  0610   INR 1.09 1.10       Consulted by:  Dr Ulloa  Indication:  Bilateral PE  Potential Drug Interactions:  Lovenox  Other Anticoagulants:  Lovenox treatment dose for bridging  Home regimen (if applicable):  None- new start 3/31    Inpatient Dosing History:    Date 3/31 4/1    INR 1.09 1.10    Coumadin dose 7.5 mg              Based on above -  1.  For today, Give warfarin (COUMADIN) 7.5 mg at 2100 tonight  2   PT/INR ordered daily while on warfarin  3.  Pharmacy will continue to follow.  We appreciate the opportunity to assist in the care of this patient.    Dee Sweeney, PharmD  4/1/2024  10:45 AM

## 2024-04-01 NOTE — PLAN OF CARE
Lovenox education provided to patient and wife. Pt's wife administered injection without issue. All questions and concerns addressed.

## 2024-04-01 NOTE — PROGRESS NOTES
Pharmacy Progress Note:  Anticoagulation Dose Adjustment     Faustino Bell is a 53 year old male on enoxaparin for Treatment of PE .  Anti-factor Xa levels are being monitored due to the patient's high risk status of obesity.    Relevant labs:    Body mass index is 57 kg/m².    Wt Readings from Last 1 Encounters:   03/29/24 (!) 196 kg (432 lb)       Lab Results   Component Value Date    CREATSERUM 0.81 03/31/2024       Anti Xa level being assessed:  0.69units/mL (Peak).  Goal Peak Anti-Xa level:  0.4-1 units/mL  Goal Trough Anti-Xa level: <0.4 units/mL  (when applicable)    Based on the above criteria, continue Lovenox at current dose. Will recheck in a week if needed.     Thank you,  Elliot Cifuentes [facility]  4/1/2024 6:43 AM

## 2024-04-01 NOTE — PROGRESS NOTES
Pulmonary Progress Note        NAME: Faustino Bell - ROOM: [id number] - MRN: [id number] - Age: 53 year old - : 1970        Last 24hrs: No events overnight, feels ok today    OBJECTIVE:  Vitals:    24 1600 24 2345 24 0415   BP: (!) 149/96 124/69 119/64 109/62   BP Location: Left arm Left arm Left arm Left arm   Pulse: 84 93 96 69   Resp: 18 18 18 18   Temp: 97.8 °F (36.6 °C) 97.5 °F (36.4 °C) 98.2 °F (36.8 °C) 97.5 °F (36.4 °C)   TempSrc: Oral Oral Oral Oral   SpO2: 93% 93% 93% 91%   Weight:       Height:           Oxygen Therapy  SpO2: 91 %  O2 Device: CPAP (agrees with above)  O2 Flow Rate (L/min): 0 L/min  Pulse Oximetry Type: Continuous  Oximetry Probe Site Changed: No  Pulse Ox Probe Location: Right hand                  Intake/Output Summary (Last 24 hours) at 2024 0812  Last data filed at 3/31/2024 0900  Gross per 24 hour   Intake 480 ml   Output --   Net 480 ml       Scheduled Medication:   enoxaparin  200 mg Subcutaneous 2 times per day    nystatin   Topical BID     Continuous Infusing Medication:    Lungs: clear to auscultation bilaterally  Heart: S1, S2 normal, no murmur, click, rub or gallop, regular rate and rhythm  Abdomen: soft, non-tender; bowel sounds normal; no masses,  no organomegaly  Extremities: venous stasis dermatitis noted    Labs reviewed as noted below      ASSESSMENT/PLAN:    Acute hypoxic resp insufficiency- due to large Braden PE's   - currently on room air  - bronchial hygiene  Acute bilateral PE's.   large clot burden.    - echo w/o signs of RV strain.  BNP/troponin normal  - LE doppler + for bilateral distal LE DVTs  -cont LMWH  -started coumadin- per pharmacy  H/o LALO- on CPAP  Dispo - Full code  - will follow  -can dc from pulm perspective  -f/u in 1-2 weeks      Fam Gamble  [facility] and [facility] [place]  Pulmonary and Critical Care

## 2024-04-01 NOTE — PLAN OF CARE
NURSING DISCHARGE NOTE    Discharged Home via Wheelchair.  Accompanied by Support staff  Belongings Taken by patient/family.    Cleared for discharge by all consults. Discharge AVS completed and reviewed with patient and wife. Discussed discharge medications, including purpose, dose, and side effects. Medications delivered to patients by [facility]. Reviewed follow-ups and the importance of maintaining those appointments. Discussed discharge instructions/precautions and when to notify MD vs seek emergency medical care. All questions and concerns addressed appropriately. Pt and wife demonstrate adequate understanding of all instructions.         Problem: RESPIRATORY - ADULT  Goal: Achieves optimal ventilation and oxygenation  Description: INTERVENTIONS:  - Assess for changes in respiratory status  - Assess for changes in mentation and behavior  - Position to facilitate oxygenation and minimize respiratory effort  - Oxygen supplementation based on oxygen saturation or ABGs  - Provide Smoking Cessation handout, if applicable  - Encourage broncho-pulmonary hygiene including cough, deep breathe, Incentive Spirometry  - Assess the need for suctioning and perform as needed  - Assess and instruct to report SOB or any respiratory difficulty  - Respiratory Therapy support as indicated  - Manage/alleviate anxiety  - Monitor for signs/symptoms of CO2 retention  Outcome: Adequate for Discharge     Problem: METABOLIC/FLUID AND ELECTROLYTES - ADULT  Goal: Electrolytes maintained within normal limits  Description: INTERVENTIONS:  - Monitor labs and rhythm and assess patient for signs and symptoms of electrolyte imbalances  - Administer electrolyte replacement as ordered  - Monitor response to electrolyte replacements, including rhythm and repeat lab results as appropriate  - Fluid restriction as ordered  - Instruct patient on fluid and nutrition restrictions as appropriate  Outcome: Adequate for Discharge     Problem:  HEMATOLOGIC - ADULT  Goal: Maintains hematologic stability  Description: INTERVENTIONS  - Assess for signs and symptoms of bleeding or hemorrhage  - Monitor labs and vital signs for trends  - Administer supportive blood products/factors, fluids and medications as ordered and appropriate  - Administer supportive blood products/factors as ordered and appropriate  Outcome: Adequate for Discharge  Goal: Free from bleeding injury  Description: (Example usage: patient with low platelets)  INTERVENTIONS:  - Avoid intramuscular injections, enemas and rectal medication administration  - Ensure safe mobilization of patient  - Hold pressure on venipuncture sites to achieve adequate hemostasis  - Assess for signs and symptoms of internal bleeding  - Monitor lab trends  - Patient is to report abnormal signs of bleeding to staff  - Avoid use of toothpicks and dental floss  - Use electric shaver for shaving  - Use soft bristle tooth brush  - Limit straining and forceful nose blowing  Outcome: Adequate for Discharge

## 2024-04-01 NOTE — PROGRESS NOTES
[facility] Cardiology  Progress Note      Faustino Bell Patient Status:  Emergency    1970 MRN [id number]   [facility] EMERGENCY DEPARTMENT Attending Costa Iqbal MD   Hosp Day # 3 PCP Arya Ojeda MD     Impression:  1. SOB x 2 wk--> acute pulmonary embolism  - CTA (3/29/24): bilateral distal main PA--> segmental branches.    - ECG: SR with RV strain, HS trop negative, pBNP <200  - TTE (3/30/24): LVEF 55-60%, RV size/function/pressure normal.  2. recent COVID infection  3. morbid obesity, LALO      Recommendations:  - AC: coumadin (considering weight), lovenox bridge.  outpatient coumadin referral made.  - hx notable for \"clotting disorder\" in mother--> outpatient hematology consultation for hypercoagulability work-up  - f/u in office 1-2 weeks.  ok for dc from cv standpoint.    SUBJECTIVE:    No dyspnea at rest. No chest pain. MARCOS much improved.        History of Present Illness:  Faustino Bell is a 53 year old  seen in cardiology consultation for acute PE.  Hx morbid obesity, LALO.  Recent COVID infection 3-4 weeks ago; presents with 2 weeks of marked exertional dyspnea, most notable climbing stairs, today, accompanying dizziness. CTA demonstrates bilateral distal main PA--> segmental branches.  pBNP <200, HS trop negative, ECG SR without RV strain. Initially hypoxia 83% RA--> stabilized on nasal cannula.  Currently, comfortable at rest.  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      Medications:        Past Medical History:   Diagnosis Date    Arrhythmia     hospitalized  fast heart rate    Hypothyroid     Obstructive apnea 2021    Tameka HST AHI 33    Prediabetes     Unspecified sleep apnea        Past Surgical History:   Procedure Laterality Date    HERNIA SURGERY         Family History  family history includes Cancer in his mother and sister; Diabetes in his maternal aunt, maternal uncle, mother, and  sister; Heart Disorder in his father; Hypertension in his father; renal failure in his father.    Social History   reports that he has never smoked. He has never used smokeless tobacco. He reports current drug use. He reports that he does not drink alcohol.     Allergies  No Known Allergies      Physical Exam:  Blood pressure 109/62, pulse 69, temperature 97.5 °F (36.4 °C), temperature source Oral, resp. rate 18, height 73\", weight (!) 432 lb (196 kg), SpO2 91%.  Temp (24hrs), Av.7 °F (36.5 °C), Min:97.5 °F (36.4 °C), Max:98.2 °F (36.8 °C)    Wt Readings from Last 3 Encounters:   24 (!) 432 lb (196 kg)   21 (!) 470 lb (213.2 kg)   21 (!) 470 lb (213.2 kg)       General: Awake and alert; in no acute distress  HEENT: Extraocular movements are intact; sclerae are anicteric; scalp is atrauamatic  Neck: Supple  Cardiac: Regular rate and regular rhythm; no murmurs/rubs/gallops are appreciated  Lungs: Clear to auscultation bilaterally; no accessory muscle use is noted  Abdomen: Soft, obese  Extremities: No clubbing or cyanosis; moves all 4 extremities normally  Psychiatric: Normal mood and affect; answers questions appropriately  Dermatologic: No rashes; normal skin turgor    Diagnostic testing:    EKG: Normal sinus rhythm    Labs:   Lab Results   Component Value Date    INR 1.10 2024    INR 1.09 2024        Lab Results   Component Value Date    .0 2024    CREATSERUM 0.86 2024    K 4.4 2024    PTT 35.4 2024    INR 1.10 2024    PTP 14.2 2024     Recent Labs   Lab 24  1409 24  0610 24  0534 24  1556 24  0610   * 111* 111*  --   --    BUN 9 8* 9  --   --    CREATSERUM 1.05 0.78 0.81  --  0.86   EGFRCR 85 107 106  --  104   CA 8.9 8.7 8.9  --   --     142 139  --   --    K 3.6 3.9  3.9 3.5 4.4  --     110 107  --   --    CO2 29.0 24.0 27.0  --   --      CTA chest:  CONCLUSION:    1. Bilateral pulmonary  emboli as detailed above.         TELE: SR      ECHO:  1. Left ventricle: The cavity size was normal. Wall thickness was at the      upper limits of normal. The estimated ejection fraction was 55-60%, by      visual assessment.   2. Right ventricle: The cavity size was normal. Systolic function was      normal.   Impressions:  No previous study was available for comparison.

## 2024-04-01 NOTE — PROGRESS NOTES
[facility] Hospitalist Progress Note     CC: Hospital Follow up    PCP: Arya Ojeda MD       Assessment/Plan:   Patient is a 53 year old male with PMH sig for obstructive sleep apnea on CPAP, hypothyroidism, morbid obesity who presented to the hospital for evaluation of shortness of breath.      Dyspnea-improved  Acute bilateral pulmonary embolism  Acute bilateral DVT  - Likely provoked in the setting of recent COVID 19 infection  - CT with bilateral pulmonary emboli  - proBNP 141, troponin negative  - Currently hemodynamically stable, on room air  - Status post heparin drip, now transition to Coumadin with Lovenox bridge, patient will need prescriptions for Lovenox and Coumadin.  Will need set up with Coumadin clinic.  Discussed INR goal of 2-3.  - Discussed with pharmacy, other options are limited given patient's BMI  - 2D echo-reviewed, normal EF, no RV strain  - Venous Dopplers bilateral-suggestive of bilateral distal DVTs, hold off on IVC filter  - Recommend follow-up with hematology in 4 to 6 weeks to further determine duration of treatment.  - Pulmonology and cardiology on consult, recommendations reviewed     LALO  - On CPAP     Morbid obesity  - BMI 57  - Weight loss endeavors outpatient     Lymphedema with skin changes  - Nystatin twice daily for 5 to 7 days given concurrent fungal infection    FN:  - IVF: None  - Diet: Cardiac     DVT Prophy: SCD, Lovenox/Coumadin  Atrophy: Ambulate as tolerated  Lines: PIV     DISPO:  Cleared for dc by marjan and pulm  Lovenox +coumadin bridge  Torin mas >> fu in coumadin clinic as outpt  Dw pharmacy regarding dc dose of coumaidn 7.5 mg daily  Check INR in 3 days    Fu pcp 1 wk  Fu onc, pulm, cards  - 1-2 wks  Fu coumadin clinic 3-5 days    Nicki Vidal Hospitalist  Pager [phone]  Answering Service number: [phone]       Subjective:     No CP,  or N/V.  Laying in bed.    No issues   Not on O2   Wife at bedside  Pt says he feels good  Wants to go  home  Says RN taught lovenox injections    OBJECTIVE:    Blood pressure 109/62, pulse 69, temperature 97.5 °F (36.4 °C), temperature source Oral, resp. rate 18, height 6' 1\" (1.854 m), weight (!) 432 lb (196 kg), SpO2 91%.    Temp:  [97.5 °F (36.4 °C)-98.2 °F (36.8 °C)] 97.5 °F (36.4 °C)  Pulse:  [69-99] 69  Resp:  [18-20] 18  BP: (109-149)/(62-96) 109/62  SpO2:  [90 %-94 %] 91 %      Intake/Output:    Intake/Output Summary (Last 24 hours) at 4/1/2024 0714  Last data filed at 3/31/2024 0900  Gross per 24 hour   Intake 480 ml   Output --   Net 480 ml       Last 3 Weights   03/29/24 2009 (!) 432 lb (196 kg)   03/29/24 1315 (!) 432 lb (196 kg)   11/17/21 1610 (!) 470 lb (213.2 kg)   05/17/21 1521 (!) 470 lb (213.2 kg)       Exam   Gen: No acute distress, alert and oriented x3, no focal neurologic deficits, morbidly obese  Heent: NC AT, mucous memb clear, neck supple  Pulm: Lungs clear, normal respiratory effort  CV: Heart with regular rate and rhythm, no peripheral edema  Abd: Abdomen soft, nontender, nondistended, bowel sounds present  MSK: Full range of motion in extremities, no clubbing, no cyanosis  Skin: Bilateral lower extremity lymphedema, extremely dry skin with signs of infection  Neuro: AO*3, motor intact, no sensory deficits  Psyc: appropriate mood and affect      Data Review:       Labs:     Recent Labs   Lab 03/29/24  1409 03/30/24  0610 03/31/24  0534 04/01/24  0610   RBC 4.87 4.55 4.50  --    HGB 12.2* 11.6* 11.4*  --    HCT 38.5* 36.2* 37.5*  --    MCV 79.1* 79.6* 83.3  --    MCH 25.1* 25.5* 25.3*  --    MCHC 31.7 32.0 30.4*  --    RDW 18.4 18.3 17.8  --    NEPRELIM 5.63 4.95 4.44  --    WBC 7.9 8.2 7.0  --    .0 215.0 208.0 201.0         Recent Labs   Lab 03/29/24  1409 03/30/24  0610 03/31/24  0534 03/31/24  1556 04/01/24  0610   * 111* 111*  --   --    BUN 9 8* 9  --   --    CREATSERUM 1.05 0.78 0.81  --  0.86   EGFRCR 85 107 106  --  104   CA 8.9 8.7 8.9  --   --     142 139  --    --    K 3.6 3.9  3.9 3.5 4.4  --     110 107  --   --    CO2 29.0 24.0 27.0  --   --        Recent Labs   Lab 03/29/24  1409   ALT 23   AST 20   ALB 3.1*         Imaging:  US VENOUS DOPPLER LEG BILAT - DIAG IMG (CPT=93970)    Result Date: 3/30/2024  CONCLUSION:  1. There is acute partially obstructing deep venous thrombus within the distal right popliteal vein and 1 of the 2 proximal posterior tibial veins.  There is occlusive thrombus in 1 of the 2 right mid posterior tibial veins.  There is also DVT within the  left calf with occlusive thrombus in 1 of the 2 left posterior tibial veins midportion and adjacent partial occlusive thrombus in the left mid posterior tibial vein.    The critical value results were called to the floor nurse, Eduin at 1113 hours on  3/30/2024. Result read back was performed.  LOCATION:  [id number]   Dictated by (CST): Papo Plaza MD on 3/30/2024 at 11:11 AM     Finalized by (CST): Papo Plaza MD on 3/30/2024 at 11:14 AM       CT ANGIOGRAPHY, CHEST (CPT=71275)    Result Date: 3/29/2024  CONCLUSION:  1. Bilateral pulmonary emboli as detailed above.  Critical findings discussed with the ED MD, Dr. Cordon at approximately 1625 hours on 3/29/2024. Read back performed.     LOCATION:  [facility]   Dictated by (CST): Julianna Freeman MD on 3/29/2024 at 4:24 PM     Finalized by (CST): Julianna Freeman MD on 3/29/2024 at 4:36 PM       XR CHEST AP PORTABLE  (CPT=71045)    Result Date: 3/29/2024  CONCLUSION:  Stable cardiac enlargement with mild interstitial opacities suggestive of mild edema.   LOCATION:  [facility]      Dictated by (CST): Bashir Maldonado MD on 3/29/2024 at 1:45 PM     Finalized by (CST): Bashir Maldonado MD on 3/29/2024 at 1:45 PM          Meds:      enoxaparin  200 mg Subcutaneous 2 times per day    nystatin   Topical BID         acetaminophen, morphINE **OR** morphINE **OR** morphINE, polyethylene glycol (PEG 3350), sennosides, bisacodyl, fleet enema, ondansetron,  prochlorperazine

## 2024-04-02 NOTE — PAYOR COMM NOTE
--------------  ADMISSION REVIEW     Payor: GOPAL GONZALEZ  Subscriber #:  [id number]  Authorization Number: [id number]    Admit date: 3/29/24  Admit time:  8:00 PM       History   HPI   this is a 53-year-old male who has a history of obstructive sleep apnea he said he who has had a recent COVID 4 weeks ago.  He felt better and now he has increasing shortness of breath.  He states every time he walks more than a few feet then he gets really short of breath and he gets chest discomfort.  The patient states that he has had no fevers recently.  He has had no wheezing.  He states that he does have sleep apnea and is on no oxygen at during the day.  He states that he is increasingly short of breath over the last couple days.  He does have history hypothyroidism he is got a history of sleep apnea.  He denies any new leg swelling.  He has chronic leg edema.  He denies any history of blood clots she denies any calf pain denies any cough or fevers.    ED Triage Vitals [03/29/24 1315]   BP (!) 148/95   Pulse 94   Resp 22   Temp 98.9 °F (37.2 °C)   Temp src Temporal   SpO2 93 %   O2 Device None (Room air)     Physical Exam  General: .  The patient is got an elevated BMI.  Not short of breath at this present time.  The patient is in no respiratory distress. The patient is not septic or toxic  HEENT: Atraumatic, conjunctiva are not pale.  There is no icterus.  Oral mucosa Is wet.  No facial trauma.  The neck is supple.  LUNGS: Clear to auscultation, there is no wheezing or retraction.  No crackles.  CV: Cardiovascular is regular without murmurs or rubs.  ABD: The abdomen is soft nondistended nontender.  There is no rebound.  There is no guarding.  EXT: There is good pulses bilaterally.  There is no calf tenderness.  There is no rash noted.  There is chronic leg swelling but there is no edema.  Patient has no calf tenderness.  NEURO: Alert and oriented x3.  Muscle strength and sensory exam is grossly normal.  And the patient is  neurologically intact with no focal findings.  Labs Reviewed   COMP METABOLIC PANEL (14) - Abnormal; Notable for the following components:       Result Value    Glucose 134 (*)     Albumin 3.1 (*)     Globulin  4.9 (*)     A/G Ratio 0.6 (*)     All other components within normal limits   D-DIMER - Abnormal; Notable for the following components:    D-Dimer 8.23 (*)     All other components within normal limits   PRO BETA NATRIURETIC PEPTIDE - Abnormal; Notable for the following components:    Pro-Beta Natriuretic Peptide 141 (*)     All other components within normal limits   CBC W/ DIFFERENTIAL - Abnormal; Notable for the following components:    HGB 12.2 (*)     HCT 38.5 (*)     MCV 79.1 (*)     MCH 25.1 (*)    CT ANGIOGRAPHY, CHEST (WIQ=16201)  Result Date: 3/29/2024  CONCLUSION:  1. Bilateral pulmonary emboli as detailed above.  Critical findings discussed with the ED MD, Dr. Sands's at approximately 1625 hours on 3/29/2024. Read back performed.     LOCATION:  [facility]   Dictated by (CST): Julianna Freeman MD on 3/29/2024 at 4:24 PM     Finalized by (CST): Julianna Freeman MD on 3/29/2024 at 4:36 PM       XR CHEST AP PORTABLE  (CPT=71045)  Result Date: 3/29/2024  CONCLUSION:  Stable cardiac enlargement with mild interstitial opacities suggestive of mild edema.   LOCATION:  [facility]      Dictated by (CST): Bashir Maldonado MD on 3/29/2024 at 1:45 PM     Finalized by (CST): Bashir Maldonado MD on 3/29/2024 at 1:45 PM        Disposition and Plan     Clinical Impression:  1. Other acute pulmonary embolism without acute cor pulmonale (HCC)    2. Hypoxia       Disposition:  Admit  3/29/2024  4:30 pm     H&P   History of Present Illness: Patient is a 53 year old male with PMH sig for obstructive sleep apnea on CPAP, hypothyroidism, morbid obesity who presented to the hospital for evaluation of shortness of breath.  Patient had COVID infection approximately 4 weeks ago.  He recovered without needing any medical intervention.  However,  for the past week or so he has been feeling short of breath with minimal exertion which was unusual for him.  He normally has lymphedema and swelling in his legs but denies any calf pain or pain in his legs with walking.  No previous history of DVT or PE.  Denies any fevers chills chest pain palpitations at this time.  In the ER, patient's vitals were stable.  Labs with elevated dimer and slightly elevated proBNP.  CTA chest suggestive of bilateral PE.  Patient was started on heparin drip and admitted to the hospital for evaluation.    /89   Pulse 85   Temp 98.9 °F (37.2 °C) (Temporal)   Resp 23   Ht 6' 1\" (1.854 m)   Wt (!) 432 lb (196 kg)   SpO2 95%   BMI 57.00 kg/m²   General:  Alert, no distress, morbidly obese gentleman in no acute distress   Head:  Normocephalic, without obvious abnormality, atraumatic.   Eyes:  Sclera anicteric, No conjunctival pallor, EOMs intact.    Nose: Nares normal. Septum midline. Mucosa normal. No drainage.   Throat: Lips, mucosa, and tongue normal. Teeth and gums normal.   Neck: Supple, symmetrical, trachea midline,   Lungs:   Clear to auscultation bilaterally. Normal effort   Chest wall:  No tenderness or deformity.   Heart:  Regular rate and rhythm, S1, S2 normal, no murmur, rub or gallop appreciated   Abdomen:   Soft, non-tender. Bowel sounds normal. No masses,  Non distended   Extremities: Extremities with venous static changes and lymphedema, no calf tenderness   Skin: Skin is dry and lower extremities   Neurologic: Normal strength, no focal deficit appreciated      Lab 03/29/24  1409   WBC 7.9   HGB 12.2*   MCV 79.1*   .0      Lab 03/29/24  1409      K 3.6      CO2 29.0   BUN 9   CREATSERUM 1.05   *   CA 8.9      Lab 03/29/24  1409   ALT 23   AST 20   ALB 3.1*      ASSESSMENT / PLAN:       Patient is a 53 year old male with PMH sig for obstructive sleep apnea on CPAP, hypothyroidism, morbid obesity who presented to the hospital for  evaluation of shortness of breath.      Dyspnea  Acute bilateral pulmonary embolism  - Likely provoked in the setting of recent COVID 19 infection  - CT with bilateral pulmonary emboli  - proBNP 141, troponin negative  - Currently hemodynamically stable, on room air  - Heparin drip  - 2D echo  - Venous Dopplers bilateral  - Pulmonology and cardiology on consult, appreciate recommendations     LALO  - On CPAP     Morbid obesity  - BMI 57  - Weight loss endeavors outpatient     FN:  - IVF: None  - Diet: Cardiac      3/30/24  Blood pressure 120/62, pulse 83, temperature 97.9 °F (36.6 °C), temperature source Oral, resp. rate 22, height 6' 1\" (1.854 m), weight (!) 432 lb (196 kg), SpO2 94%.     Lab 03/29/24  1409   RBC 4.87   HGB 12.2*   HCT 38.5*   WBC 7.9   .0      Meds:     continuous dose heparin 2,500 Units/hr (03/30/24 0304)      Assessment/Plan:     Patient is a 53 year old male with PMH sig for obstructive sleep apnea on CPAP, hypothyroidism, morbid obesity who presented to the hospital for evaluation of shortness of breath.      Dyspnea-improved  Acute bilateral pulmonary embolism  Acute bilateral DVT  - Likely provoked in the setting of recent COVID 19 infection  - CT with bilateral pulmonary emboli  - proBNP 141, troponin negative  - Currently hemodynamically stable, on room air  - Heparin drip, appreciate input on p.o. options given morbid obesity  - 2D echo-pending  - Venous Dopplers bilateral-suggestive of bilateral distal DVTs  - Pulmonology and cardiology on consult, appreciate recommendations     LALO  - On CPAP     Morbid obesity  - BMI 57  - Weight loss endeavors outpatient     Lymphedema with skin changes  - Nystatin twice daily for 5 to 7 days given concurrent fungal infection     FN:  - IVF: None  - Diet: Cardiac     DVT Prophy: SCD      CARDIOLOGY  Impression:  1. SOB x 2 wk--> acute pulmonary embolism  - CTA (3/29/24): bilateral distal main PA--> segmental branches.    - ECG: SR with RV  strain, HS trop negative, pBNP <200  2. recent COVID infection  3. morbid obesity, LALO        Recommendations:  - PE: reviewed diagnosis; no signs of sub-massive/massive consequences, although there does appear to be a fair burden of clot bilaterally. Defer system or catheter-directed lytics at this time.   - await echo  - IV heparin and convert to DOAC prior to discharge  - hx notable for \"clotting disorder\" in mother--> hematology consultation for hypercoagulability work-up      3/31/24  Blood pressure 138/85, pulse 79, temperature 97.5 °F (36.4 °C), temperature source Oral, resp. rate 18, height 6' 1\" (1.854 m), weight (!) 432 lb (196 kg), SpO2 90%.     Lab 03/29/24  1409 03/30/24  0610 03/31/24  0534   RBC 4.87 4.55 4.50   HGB 12.2* 11.6* 11.4*   HCT 38.5* 36.2* 37.5*   MCV 79.1* 79.6* 83.3   MCH 25.1* 25.5* 25.3*   MCHC 31.7 32.0 30.4*   RDW 18.4 18.3 17.8   NEPRELIM 5.63 4.95 4.44   WBC 7.9 8.2 7.0   .0 215.0 208.0      Lab 03/29/24  1409 03/30/24  0610 03/31/24  0534   * 111* 111*   BUN 9 8* 9   CREATSERUM 1.05 0.78 0.81   EGFRCR 85 107 106   CA 8.9 8.7 8.9    142 139   K 3.6 3.9  3.9 3.5    110 107   CO2 29.0 24.0 27.0      Assessment/Plan:    Patient is a 53 year old male with PMH sig for obstructive sleep apnea on CPAP, hypothyroidism, morbid obesity who presented to the hospital for evaluation of shortness of breath.      Dyspnea-improved  Acute bilateral pulmonary embolism  Acute bilateral DVT  - Likely provoked in the setting of recent COVID 19 infection  - CT with bilateral pulmonary emboli  - proBNP 141, troponin negative  - Currently hemodynamically stable, on room air  - Status post heparin drip, now transition to Coumadin with Lovenox bridge, patient will need prescriptions for Lovenox and Coumadin.  Will need set up with Coumadin clinic.  Discussed INR goal of 2-3.  - Discussed with pharmacy, other options are limited given patient's BMI  - 2D echo-reviewed, normal EF,  no RV strain  - Venous Dopplers bilateral-suggestive of bilateral distal DVTs, hold off on IVC filter  - Recommend follow-up with hematology in 4 to 6 weeks to further determine duration of treatment.  - Pulmonology and cardiology on consult, recommendations reviewed     LALO  - On CPAP     Morbid obesity  - BMI 57  - Weight loss endeavors outpatient     Lymphedema with skin changes  - Nystatin twice daily for 5 to 7 days given concurrent fungal infection     FN:  - IVF: None  - Diet: Cardiac     DVT Prophy: SCD, Lovenox/Coumadin       PULMONOLOGY  ASSESSMENT/PLAN:  Acute hypoxic resp insufficiency- due to large B PE's   - currently on room air  - bronchial hygiene.  IS to bedside  Acute bilateral PE's.   large clot burden.    - echo w/o signs of RV strain.  BNP/troponin normal  - LE doppler + for bilateral distal LE DVTs  - hold on IVC filter  - cont with hep gtt  - transition to DOAC today, dosing per pharmacy reccs  - cards following; catheter directed therapy deferred given that troponin was normal and pt is normotensive.  echo results also wnl.  H/o LALO- on CPAP      DATE OF DISCHARGE: 4/1/24    Vitals (last day) before discharge       Date/Time Temp Pulse Resp BP SpO2 Weight O2 Device O2 Flow Rate (L/min) [facility]    04/01/24 0800 97.7 °F (36.5 °C) 92 18 150/96 93 % -- -- -- KJ    04/01/24 0415 -- -- -- -- 91 % -- CPAP -- BRE    03/31/24 2345 98.2 °F (36.8 °C) 96 18 119/64 93 % -- -- -- AG    03/31/24 2015 97.5 °F (36.4 °C) 93 18 124/69 93 % -- -- -- AG    03/31/24 1600 97.8 °F (36.6 °C) 84 18 149/96 93 % -- None (Room air) --     03/31/24 1200 97.5 °F (36.4 °C) 79 18 138/85 90 % -- None (Room air) --     03/31/24 0800 97.8 °F (36.6 °C) 88 20 145/86 92 % -- None (Room air) --     03/31/24 0515 97.4 °F (36.3 °C) 79 18 108/70 91 % -- CPAP -- AG

## 2025-04-23 ENCOUNTER — APPOINTMENT (OUTPATIENT)
Dept: CV DIAGNOSTICS | Facility: HOSPITAL | Age: 55
End: 2025-04-23
Attending: EMERGENCY MEDICINE
Payer: COMMERCIAL

## 2025-04-23 ENCOUNTER — APPOINTMENT (OUTPATIENT)
Dept: GENERAL RADIOLOGY | Facility: HOSPITAL | Age: 55
End: 2025-04-23
Payer: COMMERCIAL

## 2025-04-23 ENCOUNTER — APPOINTMENT (OUTPATIENT)
Dept: ULTRASOUND IMAGING | Facility: HOSPITAL | Age: 55
End: 2025-04-23
Attending: INTERNAL MEDICINE
Payer: COMMERCIAL

## 2025-04-23 ENCOUNTER — APPOINTMENT (OUTPATIENT)
Dept: CT IMAGING | Facility: HOSPITAL | Age: 55
End: 2025-04-23
Attending: EMERGENCY MEDICINE
Payer: COMMERCIAL

## 2025-04-23 ENCOUNTER — HOSPITAL ENCOUNTER (INPATIENT)
Facility: HOSPITAL | Age: 55
LOS: 9 days | Discharge: HOME | End: 2025-05-02
Attending: EMERGENCY MEDICINE | Admitting: HOSPITALIST
Payer: COMMERCIAL

## 2025-04-23 DIAGNOSIS — R06.00 DYSPNEA, UNSPECIFIED TYPE: ICD-10-CM

## 2025-04-23 DIAGNOSIS — E04.1 THYROID NODULE: ICD-10-CM

## 2025-04-23 DIAGNOSIS — I26.02 ACUTE SADDLE PULMONARY EMBOLISM WITH ACUTE COR PULMONALE (HCC): Primary | ICD-10-CM

## 2025-04-23 LAB
ALBUMIN SERPL-MCNC: 4.3 G/DL (ref 3.2–4.8)
ALBUMIN/GLOB SERPL: 1.1 {RATIO} (ref 1–2)
ALP LIVER SERPL-CCNC: 54 U/L (ref 45–117)
ALT SERPL-CCNC: 21 U/L (ref 10–49)
ANION GAP SERPL CALC-SCNC: 10 MMOL/L (ref 0–18)
APTT PPP: 24.9 SECONDS (ref 23–36)
APTT PPP: 42.4 SECONDS (ref 23–36)
AST SERPL-CCNC: 24 U/L (ref ?–34)
BASOPHILS # BLD AUTO: 0.03 X10(3) UL (ref 0–0.2)
BASOPHILS NFR BLD AUTO: 0.3 %
BILIRUB SERPL-MCNC: 1 MG/DL (ref 0.3–1.2)
BUN BLD-MCNC: 7 MG/DL (ref 9–23)
CALCIUM BLD-MCNC: 9.1 MG/DL (ref 8.7–10.6)
CHLORIDE SERPL-SCNC: 105 MMOL/L (ref 98–112)
CHOLEST SERPL-MCNC: 129 MG/DL (ref ?–200)
CO2 SERPL-SCNC: 26 MMOL/L (ref 21–32)
CREAT BLD-MCNC: 1.09 MG/DL (ref 0.7–1.3)
EGFRCR SERPLBLD CKD-EPI 2021: 81 ML/MIN/1.73M2 (ref 60–?)
EOSINOPHIL # BLD AUTO: 0.18 X10(3) UL (ref 0–0.7)
EOSINOPHIL NFR BLD AUTO: 2.1 %
ERYTHROCYTE [DISTWIDTH] IN BLOOD BY AUTOMATED COUNT: 18.6 %
GLOBULIN PLAS-MCNC: 3.8 G/DL (ref 2–3.5)
GLUCOSE BLD-MCNC: 156 MG/DL (ref 70–99)
HCT VFR BLD AUTO: 40.4 % (ref 39–53)
HDLC SERPL-MCNC: 39 MG/DL (ref 40–59)
HGB BLD-MCNC: 12.6 G/DL (ref 13–17.5)
IMM GRANULOCYTES # BLD AUTO: 0.06 X10(3) UL (ref 0–1)
IMM GRANULOCYTES NFR BLD: 0.7 %
INR BLD: 1.08 (ref 0.8–1.2)
LDLC SERPL CALC-MCNC: 74 MG/DL (ref ?–100)
LYMPHOCYTES # BLD AUTO: 1.23 X10(3) UL (ref 1–4)
LYMPHOCYTES NFR BLD AUTO: 14 %
MCH RBC QN AUTO: 24.9 PG (ref 26–34)
MCHC RBC AUTO-ENTMCNC: 31.2 G/DL (ref 31–37)
MCV RBC AUTO: 79.7 FL (ref 80–100)
MONOCYTES # BLD AUTO: 0.42 X10(3) UL (ref 0.1–1)
MONOCYTES NFR BLD AUTO: 4.8 %
NEUTROPHILS # BLD AUTO: 6.85 X10 (3) UL (ref 1.5–7.7)
NEUTROPHILS # BLD AUTO: 6.85 X10(3) UL (ref 1.5–7.7)
NEUTROPHILS NFR BLD AUTO: 78.1 %
NONHDLC SERPL-MCNC: 90 MG/DL (ref ?–130)
NT-PROBNP SERPL-MCNC: 196 PG/ML (ref ?–125)
OSMOLALITY SERPL CALC.SUM OF ELEC: 293 MOSM/KG (ref 275–295)
PLATELET # BLD AUTO: 191 10(3)UL (ref 150–450)
POTASSIUM SERPL-SCNC: 4 MMOL/L (ref 3.5–5.1)
PROT SERPL-MCNC: 8.1 G/DL (ref 5.7–8.2)
PROTHROMBIN TIME: 14.1 SECONDS (ref 11.6–14.8)
RBC # BLD AUTO: 5.07 X10(6)UL (ref 4.3–5.7)
SODIUM SERPL-SCNC: 141 MMOL/L (ref 136–145)
TRIGL SERPL-MCNC: 81 MG/DL (ref 30–149)
TROPONIN I SERPL HS-MCNC: 108 NG/L (ref ?–53)
VLDLC SERPL CALC-MCNC: 12 MG/DL (ref 0–30)
WBC # BLD AUTO: 8.8 X10(3) UL (ref 4–11)

## 2025-04-23 PROCEDURE — 85730 THROMBOPLASTIN TIME PARTIAL: CPT | Performed by: STUDENT IN AN ORGANIZED HEALTH CARE EDUCATION/TRAINING PROGRAM

## 2025-04-23 PROCEDURE — 83880 ASSAY OF NATRIURETIC PEPTIDE: CPT | Performed by: EMERGENCY MEDICINE

## 2025-04-23 PROCEDURE — 85025 COMPLETE CBC W/AUTO DIFF WBC: CPT | Performed by: EMERGENCY MEDICINE

## 2025-04-23 PROCEDURE — 80053 COMPREHEN METABOLIC PANEL: CPT | Performed by: EMERGENCY MEDICINE

## 2025-04-23 PROCEDURE — 99285 EMERGENCY DEPT VISIT HI MDM: CPT

## 2025-04-23 PROCEDURE — 94660 CPAP INITIATION&MGMT: CPT

## 2025-04-23 PROCEDURE — 93010 ELECTROCARDIOGRAM REPORT: CPT

## 2025-04-23 PROCEDURE — 93306 TTE W/DOPPLER COMPLETE: CPT | Performed by: EMERGENCY MEDICINE

## 2025-04-23 PROCEDURE — 71045 X-RAY EXAM CHEST 1 VIEW: CPT

## 2025-04-23 PROCEDURE — 96365 THER/PROPH/DIAG IV INF INIT: CPT

## 2025-04-23 PROCEDURE — 85730 THROMBOPLASTIN TIME PARTIAL: CPT | Performed by: EMERGENCY MEDICINE

## 2025-04-23 PROCEDURE — 71275 CT ANGIOGRAPHY CHEST: CPT | Performed by: EMERGENCY MEDICINE

## 2025-04-23 PROCEDURE — 84484 ASSAY OF TROPONIN QUANT: CPT | Performed by: EMERGENCY MEDICINE

## 2025-04-23 PROCEDURE — 80061 LIPID PANEL: CPT | Performed by: EMERGENCY MEDICINE

## 2025-04-23 PROCEDURE — 93970 EXTREMITY STUDY: CPT | Performed by: INTERNAL MEDICINE

## 2025-04-23 PROCEDURE — 5A09357 ASSISTANCE WITH RESPIRATORY VENTILATION, LESS THAN 24 CONSECUTIVE HOURS, CONTINUOUS POSITIVE AIRWAY PRESSURE: ICD-10-PCS | Performed by: STUDENT IN AN ORGANIZED HEALTH CARE EDUCATION/TRAINING PROGRAM

## 2025-04-23 PROCEDURE — 85610 PROTHROMBIN TIME: CPT | Performed by: EMERGENCY MEDICINE

## 2025-04-23 PROCEDURE — 93005 ELECTROCARDIOGRAM TRACING: CPT

## 2025-04-23 RX ORDER — HEPARIN SODIUM 1000 [USP'U]/ML
80 INJECTION, SOLUTION INTRAVENOUS; SUBCUTANEOUS ONCE
Status: COMPLETED | OUTPATIENT
Start: 2025-04-23 | End: 2025-04-23

## 2025-04-23 RX ORDER — ASPIRIN 81 MG/1
81 TABLET ORAL DAILY
COMMUNITY
End: 2025-05-02

## 2025-04-23 RX ORDER — HEPARIN SODIUM AND DEXTROSE 10000; 5 [USP'U]/100ML; G/100ML
INJECTION INTRAVENOUS CONTINUOUS
Status: DISCONTINUED | OUTPATIENT
Start: 2025-04-23 | End: 2025-05-02

## 2025-04-23 RX ORDER — ACETAMINOPHEN 500 MG
500 TABLET ORAL EVERY 6 HOURS PRN
Status: DISCONTINUED | OUTPATIENT
Start: 2025-04-23 | End: 2025-05-02

## 2025-04-23 RX ORDER — HEPARIN SODIUM 1000 [USP'U]/ML
30 INJECTION, SOLUTION INTRAVENOUS; SUBCUTANEOUS ONCE
Status: COMPLETED | OUTPATIENT
Start: 2025-04-23 | End: 2025-04-23

## 2025-04-23 RX ORDER — HEPARIN SODIUM AND DEXTROSE 10000; 5 [USP'U]/100ML; G/100ML
18 INJECTION INTRAVENOUS ONCE
Status: COMPLETED | OUTPATIENT
Start: 2025-04-23 | End: 2025-04-23

## 2025-04-23 NOTE — ED QUICK NOTES
Orders for admission, patient is aware of plan and ready to go upstairs. Any questions, please call ED RN Jose at extension [phone].     Patient Covid vaccination status: Fully vaccinated     COVID Test Ordered in ED: None    COVID Suspicion at Admission: N/A    Running Infusions: Medication Infusions[1] None    Mental Status/LOC at time of transport: AOx4    Other pertinent information:   CIWA score: N/A   NIH score:  N/A             [1]    continuous dose heparin

## 2025-04-23 NOTE — CONSULTS
Pulmonary Consult     Assessment / Plan:  Acute pulmonary emboli - recurrent after stopping Coumadin. CT chest suggestive of right heart strain.  - TTE  - LE dopplers  - heparin gtt for now. Will need lifelong anticoagulation.   - consider catheter directed lytics pending above workup; cardiology to see  LALO  - CPAP  Dispo  - will follow    Calin Zhang MD  Pulmonary and Critical Care Medicine      History of Present Illness:   Mr. Bell is a 54 year old with history of PE/DVT and LALO on CPAP who we are asked to see for acute PE. Patient has a history of provoked PE/DVT in the setting of COVID infection. He was treated for a year with Coumadin and stopped Coumadin about one month ago. He now presents with worsening chest pain and dyspnea for the last several days. He notes chronic LE lymphedema that is no worse than usual. No recent travel. He works from home and is relatively sedentary. He notes his mom had a history of blood clots.     12 point ROS negative except per HPI.    Past Medical History[1]    Past Surgical History[2]    Prescriptions Prior to Admission[3]  Medications Taking[4]   Allergies[5]   Short Social Hx on File[6]    Family History[7]      Exam:  Vitals:    04/23/25 1144 04/23/25 1215 04/23/25 1415 04/23/25 1445   BP: (!) 150/101 (!) 152/93 116/77 (!) 144/94   Pulse: 105 106 104 103   Resp: 24 22 13 14   Temp: 98.5 °F (36.9 °C)      TempSrc: Oral      SpO2: (!) 85% 95% 94% 98%   Weight: (!) 450 lb (204.1 kg)      Height: 6' (1.829 m)        General: no apparent distress, conversant  Skin: no rash, ulcers or subcutaneous nodules  Eyes: anicteric sclerae, moist conjunctivae  Head, ears, nose, throat: atraumatic, oropharynx clear with moist mucous membranes  Neck: trachea midline with no thyromegaly  Heart: regular rate and rhythm, no murmurs / rubs / gallops  Lungs: clear bilaterally, normal respiratory effort, no accessory muscle use  Extremities: no edema or cyanosis  Psych: interactive,  answering questions appropriately, appropriate affect    Labs:  Reviewed in EMR    Inpatient Medications:  Reviewed in EMR    Imaging:   Chest imaging reviewed         [1]   Past Medical History:   Arrhythmia    hospitalized 2007 fast heart rate    Hypothyroid    Obstructive apnea    Guly HST AHI 33    Prediabetes    Unspecified sleep apnea   [2]   Past Surgical History:  Procedure Laterality Date    Hernia surgery     [3] (Not in a hospital admission)  [4]   No outpatient medications have been marked as taking for the 4/23/25 encounter (Hospital Encounter).   [5] No Known Allergies  [6]   Social History  Socioeconomic History    Marital status:    Tobacco Use    Smoking status: Never    Smokeless tobacco: Never   Vaping Use    Vaping status: Never Used   Substance and Sexual Activity    Alcohol use: No    Drug use: Yes     Comment: thc gummies for sleep     Social Drivers of Health     Food Insecurity: No Food Insecurity (3/29/2024)    Food Insecurity     Food Insecurity: Never true   Transportation Needs: No Transportation Needs (3/29/2024)    Transportation Needs     Lack of Transportation: No   Housing Stability: Low Risk  (3/29/2024)    Housing Stability     Housing Instability: No   [7]   Family History  Problem Relation Age of Onset    Hypertension Father     Heart Disorder Father         MI final event    Other (renal failure) Father     Cancer Mother     Diabetes Mother     Diabetes Sister     Cancer Sister     Diabetes Maternal Aunt     Diabetes Maternal Uncle

## 2025-04-23 NOTE — ED INITIAL ASSESSMENT (HPI)
Patient to ED with shortness of breath and inability to ambulate without feeling shortness of breath, even 5 feet. Patient has hx of PE and was taken off of warfarin 6 weeks ago.

## 2025-04-23 NOTE — H&P
[facility] Hospitalist History and Physical      Chief Complaint   Patient presents with    Shortness Of Breath        PCP: Orlando Goodson MD    History of Present Illness: Patient is a 54 year old male with history of LALO on CPAP, PE/ DVT (recently off coumadin), morbid obesity who presented for acute dyspnea. No chest pain. Describes symptoms mostly exertional. No recent travel.     In the ER, hypertensive and hypoxic requiring 4 L NC. CTA with saddle PE with evidence of right heart strain. Remaining lab work fairly unremarkable. Patient started on heparin gtt and admitted for further management.     Medical History:  Past Medical History[1]   Past Surgical History[2]   Social History     Tobacco Use    Smoking status: Never    Smokeless tobacco: Never   Substance Use Topics    Alcohol use: No      Family History[3]    Allergies[4]     Review of Systems  Comprehensive ROS reviewed and negative except for what is stated in HPI.      OBJECTIVE:  BP (!) 159/98   Pulse 98   Temp 98.5 °F (36.9 °C) (Oral)   Resp 19   Ht 6' (1.829 m)   Wt (!) 450 lb (204.1 kg)   SpO2 100%   BMI 61.03 kg/m²   General: No apparent distress.  Alert and oriented.  HEENT:  EOMI, PERRLA.  Pulm: Clear breath sounds bilaterally.  Normal respiratory effort.  CV: Regular rate and rhythm, no murmur.   Abd: Obese abdomen, nontender, nondistended.   MSK: Peripheral edema bilaterally.   Skin: No lesions or rashes.  Neuro: No obvious focal deficits.    Data Review:    LABS:   Lab Results   Component Value Date    WBC 8.8 04/23/2025    HGB 12.6 04/23/2025    HCT 40.4 04/23/2025    .0 04/23/2025    CREATSERUM 1.09 04/23/2025    BUN 7 04/23/2025     04/23/2025    K 4.0 04/23/2025     04/23/2025    CO2 26.0 04/23/2025     04/23/2025    CA 9.1 04/23/2025    ALB 4.3 04/23/2025    ALKPHO 54 04/23/2025    BILT 1.0 04/23/2025    TP 8.1 04/23/2025    AST 24 04/23/2025    ALT 21 04/23/2025    PTT 24.9 04/23/2025    INR  1.08 04/23/2025    PTP 14.1 04/23/2025       All lab work and imaging personally reviewed.    Assessment/Plan:     Assessment/ Plan:     #AHRF 2/2 saddle PE with evidence of right heart strain  -obtain stat echo and LE duplex  -heparin gtt- previously on coumadin stopped over last month  -monitor hemodynamics and wean supp oxygen as tolerated  -further recs per cardiology and pulmonary regarding catheter direct thrombolysis.     #LALO  -CPAP nightly    #Hypothyroidism  -resume home levothyroxine once verified    DVT ppx: heparin gtt  Diet: ADAT  Disposition: inpatient     Outpatient records or previous hospital records reviewed.   [facility] hospitalist to continue to follow patient while in house.    Ildefonsonaz Polanco [facility] Hospitalist       [1]   Past Medical History:   Arrhythmia    hospitalized 2007 fast heart rate    Hypothyroid    Obstructive apnea    Guly HST AHI 33    Prediabetes    Unspecified sleep apnea   [2]   Past Surgical History:  Procedure Laterality Date    Hernia surgery     [3]   Family History  Problem Relation Age of Onset    Hypertension Father     Heart Disorder Father         MI final event    Other (renal failure) Father     Cancer Mother     Diabetes Mother     Diabetes Sister     Cancer Sister     Diabetes Maternal Aunt     Diabetes Maternal Uncle    [4] No Known Allergies

## 2025-04-23 NOTE — CONSULTS
[facility] Cardiology Consultation    Faustino Bell Patient Status:  Emergency    1970 MRN [id number]   [facility] EMERGENCY DEPARTMENT Attending Raimundo Holt MD   Hosp Day # 0 PCP Orlando Goodson MD     Reason for Consultation:  PE      History of Present Illness:  Faustino Bell is a a(n) 54 year old male. He has hypothyroidism, LALO, morbid obesity.  He has a hx of PE/DVT 3/2024. Used warfarin therefate until 6 weeks ago when it was changed to aspirin.  He reports MARCOS with walking 5 feet for last 2 days.  No chest pain.  No changes in LE edema which is chronic.  No travel/trauma/surgery.  No rest dyspnea.  Came to ER.  CTA chest shows a saddle PE. Remains comfortable at rest.  IV heparin has been started.  Troponin is 108. Initial O2 sat = 85%    History:  Past Medical History[1]  Past Surgical History[2]  Family History[3]      Allergies:  Allergies[4]    Medications:  Scheduled Medications[5]    Continuous Infusions:  Medication Infusions[6]    Social History:   reports that he has never smoked. He has never used smokeless tobacco. He reports current drug use. He reports that he does not drink alcohol.    Review of Systems:  All systems were reviewed and are negative except as described above in HPI.    Physical Exam:      Wt Readings from Last 3 Encounters:   25 (!) 450 lb (204.1 kg)   24 (!) 432 lb (196 kg)   21 (!) 470 lb (213.2 kg)       Vitals:    25 1144 25 1215 25 1415 25 1445   BP: (!) 150/101 (!) 152/93 116/77 (!) 144/94   Pulse: 105 106 104 103   Resp:  13 14   Temp: 98.5 °F (36.9 °C)      TempSrc: Oral      SpO2: (!) 85% 95% 94% 98%   Weight: (!) 450 lb (204.1 kg)      Height: 6' (1.829 m)          Temp:  [98.5 °F (36.9 °C)] 98.5 °F (36.9 °C)  Pulse:  [103-106] 103  Resp:  [13-24] 14  BP: (116-152)/() 144/94  SpO2:  [85 %-98 %] 98 %    Temp: 98.5 °F (36.9 °C)  Pulse: 103  Resp: 14  BP: 144/94      General:  Appears  comfortable  HEENT: No focal deficits.  Neck: No JVD, carotids 2+ no bruits.  Cardiac: Regular S1S2.  No S3, S4, rub, click.  No murmur.  Lungs: Clear to auscultation and percussion.  Abdomen: Soft, non-tender.   Extremities: chronic Lvenous stasis changes. 1+ bilateral LE edema.  No clubbing or cyanosis  Neurologic: Alert and oriented, normal affect.  Skin: Warm and dry.     Labs:      HEM:  Recent Labs   Lab 04/23/25  1316   WBC 8.8   HGB 12.6*   HCT 40.4   .0       Chem:  Recent Labs   Lab 04/23/25  1316      K 4.0      CO2 26.0   BUN 7*   CREATSERUM 1.09   ALT 21   AST 24   ALB 4.3       Recent Labs   Lab 04/23/25  1316   INR 1.08                     No results found for: \"TROP\", \"CKMB\"      Invalid input(s): \"PBNPML\"                 Telemetry:     Laboratories and Data:  Diagnostics:    EKG, 4/23/2025:  /min.  Q III, old    CXR, 4/23/2025:      CT chest, 4/23/25  CONCLUSION:    1. Large bilateral pulmonary embolism with CT findings suggestive of right heart strain, recommend correlation to echo.   2. 4.8 cm left thyroid lobe nodule.  Dedicated thyroid ultrasound is recommended.     Impression:    1.  Saddle PE. . The PE is recurrent. He exhibits stable hemodynamics and is comfortable at rest.   2. Morbid obesity  3. LALO  4. Hypothyroidism      Recommend:    1.  Telemetry  2. IV heparin transitioning to coumadin.   3. Consider heme consult: is he a candidate for DOAC given his weight.  Should he undergo hypercoag eval?  4. I suspect he will need life-long OAC  5. Echo for RV fct and PA pressure            Pineda Le MD  4/23/2025  3:49 PM         [1]   Past Medical History:   Arrhythmia    hospitalized 2007 fast heart rate    Hypothyroid    Obstructive apnea    Guly HST AHI 33    Prediabetes    Unspecified sleep apnea   [2]   Past Surgical History:  Procedure Laterality Date    Hernia surgery     [3]   Family History  Problem Relation Age of Onset    Hypertension Father      Heart Disorder Father         MI final event    Other (renal failure) Father     Cancer Mother     Diabetes Mother     Diabetes Sister     Cancer Sister     Diabetes Maternal Aunt     Diabetes Maternal Uncle    [4] No Known Allergies  [5]    ED initial dose (PE/DVT/THROMBUS) heparin  18 Units/kg/hr (Adjusted) Intravenous Once   [6]    continuous dose heparin

## 2025-04-23 NOTE — ED PROVIDER NOTES
Patient Seen in: [facility] Emergency Department      History     Chief Complaint   Patient presents with    Shortness Of Breath     Stated Complaint: SOB with exerction. Slight cough.  hx of % on RA    Subjective:   HPI  This is a 51-year-old gentleman history of previous pulmonary embolism, off Coumadin for the last 6 weeks, here for evaluation of shortness of breath with exertion over the past 2 days.  Denies any chest pain any fevers any new cough.  Has chronic lymphedema to the bilateral lower extremities no change recently.  No other complaints or concerns at this time.    History of Present Illness               Objective:     Past Medical History:    Arrhythmia    hospitalized 2007 fast heart rate    Hypothyroid    Obstructive apnea    Guly HST AHI 33    Prediabetes    Unspecified sleep apnea              Past Surgical History:   Procedure Laterality Date    Hernia surgery                  Social History     Socioeconomic History    Marital status:    Tobacco Use    Smoking status: Never    Smokeless tobacco: Never   Vaping Use    Vaping status: Never Used   Substance and Sexual Activity    Alcohol use: No    Drug use: Yes     Comment: thc gummies for sleep     Social Drivers of Health     Food Insecurity: No Food Insecurity (3/29/2024)    Food Insecurity     Food Insecurity: Never true   Transportation Needs: No Transportation Needs (3/29/2024)    Transportation Needs     Lack of Transportation: No   Housing Stability: Low Risk  (3/29/2024)    Housing Stability     Housing Instability: No                                Physical Exam     ED Triage Vitals [04/23/25 1144]   BP (!) 150/101   Pulse 105   Resp 24   Temp 98.5 °F (36.9 °C)   Temp src Oral   SpO2 (!) 85 %   O2 Device None (Room air)       Current Vitals:   Vital Signs  BP: (!) 144/94  Pulse: 103  Resp: 14  Temp: 98.5 °F (36.9 °C)  Temp src: Oral  MAP (mmHg): (!) 108    Oxygen Therapy  SpO2: 98 %  O2 Device: Nasal cannula  O2 Flow  Rate (L/min): 4 L/min        Physical Exam      Physical Exam  Vitals signs and nursing note reviewed.   General:  Patient laying supine in the bed in no acute distress  Head: Normocephalic and atraumatic.   HEENT:  Mucous membranes are moist.   Cardiovascular:  Normal rate and regular rhythm.  Brawny edema bilateral lower extremities  Pulmonary:  Pulmonary effort is normal.  Normal breath sounds. No wheezing, rhonchi or rales.   Abdominal: Soft nontender nondistended, normal bowel sounds, no guarding no rebound tenderness  Skin: Warm and dry  Neurological: Awake alert, speech is normal      Physical Exam                ED Course     Labs Reviewed   CBC WITH DIFFERENTIAL WITH PLATELET - Abnormal; Notable for the following components:       Result Value    HGB 12.6 (*)     MCV 79.7 (*)     MCH 24.9 (*)     All other components within normal limits   COMP METABOLIC PANEL (14) - Abnormal; Notable for the following components:    Glucose 156 (*)     BUN 7 (*)     Globulin  3.8 (*)     All other components within normal limits   PROTHROMBIN TIME (PT) - Normal   PTT, ACTIVATED - Normal   PRO BETA NATRIURETIC PEPTIDE   TROPONIN I HIGH SENSITIVITY   RAINBOW DRAW LAVENDER   RAINBOW DRAW LIGHT GREEN   RAINBOW DRAW BLUE   RAINBOW DRAW GOLD     EKG    Rate, intervals and axes as noted on EKG Report.  Rate: 100  Rhythm: Sinus Rhythm  Reading: No acute ischemic changes              Results            CTA CHEST (CPT=71275)  Result Date: 4/23/2025  PROCEDURE:  CT ANGIOGRAPHY, CHEST (CPT=71275)  COMPARISON:  [facility] , CT, CT ANGIOGRAPHY, CHEST (CPT=71275), 3/29/2024, 4:03 PM.  INDICATIONS:  SOB with exerction. Slight cough.  hx of % on RA  TECHNIQUE:  IV contrast-enhanced multislice CT angiography is performed through the pulmonary arterial anatomy. 3D volume renderings are generated.  Dose reduction techniques were used. Dose information is transmitted to the ACR (American College of Radiology) NRDR (National Radiology Data  Registry) which includes the Dose Index Registry.  PATIENT STATED HISTORY:(As transcribed by Technologist)  Patient states shortness of breath, cough, history of PE.   CONTRAST USED:  100cc of Isovue 370  FINDINGS:  VASCULATURE:  There is saddle pulmonary embolus extending from the bilateral mainstem bronchi into the lobar, segmental and subsegmental vessels within all 5 lobes.  Increased RV to LV ratio which could reflect underlying right heart strain, recommend correlation to echo. THORACIC AORTA:  No aneurysm or visible dissection.  LUNGS:  No visible pulmonary disease.  MEDIASTINUM:  No adenopathy or mass.  4.8 cm left thyroid lobe nodule. JOSE:  No mass or adenopathy.  CARDIAC:  No enlargement, pericardial thickening, or significant coronary artery calcification.  Increased RV to LV ratio PLEURA:  No mass or effusion.  CHEST WALL:  No mass or axillary adenopathy.  LIMITED ABDOMEN:  Limited images of the upper abdomen are unremarkable.  BONES:  Degenerative changes of the spine. OTHER:  Negative.             CONCLUSION:  1. Large bilateral pulmonary embolism with CT findings suggestive of right heart strain, recommend correlation to echo. 2. 4.8 cm left thyroid lobe nodule.  Dedicated thyroid ultrasound is recommended.  COMMUNICATION:  The findings were communicated with Dr. Holt at 1458 on 4/23/2025. There was appropriate read back.    LOCATION:  [facility]   Dictated by (CST): Gopal Reese MD on 4/23/2025 at 2:55 PM     Finalized by (CST): Gopal Reese MD on 4/23/2025 at 2:58 PM       XR CHEST AP PORTABLE  (CPT=71045)  Result Date: 4/23/2025  PROCEDURE:  XR CHEST AP PORTABLE  (CPT=71045)  TECHNIQUE:  AP chest radiograph was obtained.  COMPARISON:  [facility] , XR, XR CHEST AP PORTABLE  (CPT=71045), 3/29/2024, 1:25 PM.  INDICATIONS:  SOB with exerction. Slight cough.  hx of % on RA  PATIENT STATED HISTORY: (As transcribed by Technologist)  Patient stated he has been having difficulty breathing when walking.     FINDINGS:  Cardiomegaly with normal pulmonary vascularity. No pleural effusion or pneumothorax. No lobar consolidation.  Degenerative changes in the spine.            CONCLUSION:  No active cardiopulmonary process identified.   LOCATION:  [facility]      Dictated by (CST): Joce Allen MD on 4/23/2025 at 12:55 PM     Finalized by (CST): Joce Allen MD on 4/23/2025 at 12:55 PM                            MDM      53-year-old gentleman here for evaluation of exertional dyspnea over the past 2 days history of previous pulmonary embolism, anticoagulation stopped 6 weeks ago.  Differential includes acute pulmonary embolism ACS, pneumonia pneumothorax.  Patient is afebrile hemodynamically stable does have 2 L oxygen requirement blood pressure 140s over 90s.  CT of the chest was obtained shows extensive bilateral pulmonary emboli with right heart strain.  Patient started on heparin drip we are waiting biomarkers, will be admitted to CTU for further monitoring evaluation pulmonary consulted will discuss cardiology case endorsed to the [facility] hospitalist who agrees with plan for admission.    Troponin 108, discussed with Dr. Le from cardiology    I independently viewed and interpreted the following imaging: CT chest shows extensive bilateral pulmonary emboli  Admission disposition: 4/23/2025  2:59 PM           Medical Decision Making      Total critical care time: Approximately 75 minutes  Due to a high probability of clinically significant, life threatening deterioration, the patient required my highest level of preparedness to intervene emergently and I personally spent this critical care time directly and personally managing the patient. This critical care time included obtaining a history; examining the patient; pulse oximetry; ordering and review of studies; arranging urgent treatment with development of a management plan; evaluation of patient's response to treatment; frequent reassessment; and, discussions  with other providers.  This critical care time was performed to assess and manage the high probability of imminent, life-threatening deterioration that could result in multi-organ failure. It was exclusive of separately billable procedures and treating other patients and teaching time.    Disposition and Plan     Clinical Impression:  1. Acute saddle pulmonary embolism with acute cor pulmonale (HCC)    2. Dyspnea, unspecified type         Disposition:  Admit  4/23/2025  2:59 pm    Follow-up:  No follow-up provider specified.        Medications Prescribed:  There are no discharge medications for this patient.      Supplementary Documentation:         Hospital Problems       Present on Admission  Date Reviewed: 11/17/2021          ICD-10-CM Noted POA    * (Principal) Acute saddle pulmonary embolism with acute cor pulmonale (HCC) I26.02 4/23/2025 Unknown

## 2025-04-24 ENCOUNTER — APPOINTMENT (OUTPATIENT)
Dept: INTERVENTIONAL RADIOLOGY/VASCULAR | Facility: HOSPITAL | Age: 55
End: 2025-04-24
Attending: INTERNAL MEDICINE
Payer: COMMERCIAL

## 2025-04-24 LAB
ALBUMIN SERPL-MCNC: 4.1 G/DL (ref 3.2–4.8)
ALBUMIN/GLOB SERPL: 1.2 {RATIO} (ref 1–2)
ALP LIVER SERPL-CCNC: 49 U/L (ref 45–117)
ALT SERPL-CCNC: 18 U/L (ref 10–49)
ANION GAP SERPL CALC-SCNC: 9 MMOL/L (ref 0–18)
APTT PPP: 165.8 SECONDS (ref 23–36)
APTT PPP: 53.4 SECONDS (ref 23–36)
AST SERPL-CCNC: 20 U/L (ref ?–34)
AVOX TOTAL HEMOGLOBIN CONCENTRATION: 10.3 G/DL (ref 13–17.5)
BILIRUB SERPL-MCNC: 1.1 MG/DL (ref 0.3–1.2)
BUN BLD-MCNC: 9 MG/DL (ref 9–23)
CALCIUM BLD-MCNC: 8.7 MG/DL (ref 8.7–10.6)
CHLORIDE SERPL-SCNC: 107 MMOL/L (ref 98–112)
CO2 SERPL-SCNC: 28 MMOL/L (ref 21–32)
COHGB MFR BLD: 60 % (ref 65–80)
CREAT BLD-MCNC: 0.96 MG/DL (ref 0.7–1.3)
EGFRCR SERPLBLD CKD-EPI 2021: 95 ML/MIN/1.73M2 (ref 60–?)
ERYTHROCYTE [DISTWIDTH] IN BLOOD BY AUTOMATED COUNT: 18.3 %
GLOBULIN PLAS-MCNC: 3.3 G/DL (ref 2–3.5)
GLUCOSE BLD-MCNC: 135 MG/DL (ref 70–99)
HCT VFR BLD AUTO: 37.7 % (ref 39–53)
HGB BLD-MCNC: 11.9 G/DL (ref 13–17.5)
MCH RBC QN AUTO: 24.7 PG (ref 26–34)
MCHC RBC AUTO-ENTMCNC: 31.6 G/DL (ref 31–37)
MCV RBC AUTO: 78.4 FL (ref 80–100)
OSMOLALITY SERPL CALC.SUM OF ELEC: 299 MOSM/KG (ref 275–295)
PLATELET # BLD AUTO: 182 10(3)UL (ref 150–450)
POTASSIUM SERPL-SCNC: 3.6 MMOL/L (ref 3.5–5.1)
PROT SERPL-MCNC: 7.4 G/DL (ref 5.7–8.2)
RBC # BLD AUTO: 4.81 X10(6)UL (ref 4.3–5.7)
SODIUM SERPL-SCNC: 144 MMOL/L (ref 136–145)
WBC # BLD AUTO: 8.9 X10(3) UL (ref 4–11)

## 2025-04-24 PROCEDURE — 85730 THROMBOPLASTIN TIME PARTIAL: CPT | Performed by: STUDENT IN AN ORGANIZED HEALTH CARE EDUCATION/TRAINING PROGRAM

## 2025-04-24 PROCEDURE — 85027 COMPLETE CBC AUTOMATED: CPT | Performed by: STUDENT IN AN ORGANIZED HEALTH CARE EDUCATION/TRAINING PROGRAM

## 2025-04-24 PROCEDURE — 02CR3ZZ EXTIRPATION OF MATTER FROM LEFT PULMONARY ARTERY, PERCUTANEOUS APPROACH: ICD-10-PCS | Performed by: INTERNAL MEDICINE

## 2025-04-24 PROCEDURE — 4A023N6 MEASUREMENT OF CARDIAC SAMPLING AND PRESSURE, RIGHT HEART, PERCUTANEOUS APPROACH: ICD-10-PCS | Performed by: INTERNAL MEDICINE

## 2025-04-24 PROCEDURE — 75743 ARTERY X-RAYS LUNGS: CPT | Performed by: INTERNAL MEDICINE

## 2025-04-24 PROCEDURE — B31T1ZZ FLUOROSCOPY OF LEFT PULMONARY ARTERY USING LOW OSMOLAR CONTRAST: ICD-10-PCS | Performed by: INTERNAL MEDICINE

## 2025-04-24 PROCEDURE — 02CQ3ZZ EXTIRPATION OF MATTER FROM RIGHT PULMONARY ARTERY, PERCUTANEOUS APPROACH: ICD-10-PCS | Performed by: INTERNAL MEDICINE

## 2025-04-24 PROCEDURE — B31S1ZZ FLUOROSCOPY OF RIGHT PULMONARY ARTERY USING LOW OSMOLAR CONTRAST: ICD-10-PCS | Performed by: INTERNAL MEDICINE

## 2025-04-24 PROCEDURE — 37184 PRIM ART M-THRMBC 1ST VSL: CPT | Performed by: INTERNAL MEDICINE

## 2025-04-24 PROCEDURE — 80053 COMPREHEN METABOLIC PANEL: CPT | Performed by: STUDENT IN AN ORGANIZED HEALTH CARE EDUCATION/TRAINING PROGRAM

## 2025-04-24 PROCEDURE — 99153 MOD SED SAME PHYS/QHP EA: CPT | Performed by: INTERNAL MEDICINE

## 2025-04-24 PROCEDURE — 85730 THROMBOPLASTIN TIME PARTIAL: CPT | Performed by: INTERNAL MEDICINE

## 2025-04-24 PROCEDURE — 94799 UNLISTED PULMONARY SVC/PX: CPT

## 2025-04-24 PROCEDURE — 36014 PLACE CATHETER IN ARTERY: CPT | Performed by: INTERNAL MEDICINE

## 2025-04-24 PROCEDURE — 99152 MOD SED SAME PHYS/QHP 5/>YRS: CPT | Performed by: INTERNAL MEDICINE

## 2025-04-24 RX ORDER — POTASSIUM CHLORIDE 1500 MG/1
40 TABLET, EXTENDED RELEASE ORAL EVERY 4 HOURS
Status: COMPLETED | OUTPATIENT
Start: 2025-04-24 | End: 2025-04-24

## 2025-04-24 RX ORDER — HEPARIN SODIUM 5000 [USP'U]/ML
INJECTION, SOLUTION INTRAVENOUS; SUBCUTANEOUS
Status: COMPLETED
Start: 2025-04-24 | End: 2025-04-24

## 2025-04-24 RX ORDER — MIDAZOLAM HYDROCHLORIDE 1 MG/ML
INJECTION INTRAMUSCULAR; INTRAVENOUS
Status: COMPLETED
Start: 2025-04-24 | End: 2025-04-24

## 2025-04-24 RX ORDER — HEPARIN SODIUM 1000 [USP'U]/ML
30 INJECTION, SOLUTION INTRAVENOUS; SUBCUTANEOUS ONCE
Status: COMPLETED | OUTPATIENT
Start: 2025-04-24 | End: 2025-04-24

## 2025-04-24 RX ORDER — LIDOCAINE HYDROCHLORIDE 10 MG/ML
INJECTION, SOLUTION EPIDURAL; INFILTRATION; INTRACAUDAL; PERINEURAL
Status: COMPLETED
Start: 2025-04-24 | End: 2025-04-24

## 2025-04-24 NOTE — PROGRESS NOTES
Pulmonary Progress Note        NAME: Faustino Blel - ROOM: [facility]86-A - MRN: [id number] - Age: 54 year old - : 1970        Last 24hrs: No events overnight, still w/ some conversational dyspnea    OBJECTIVE:  Vitals:    25 2336 25 0016 25 0157 25 0431   BP: 136/70 151/85  122/69   BP Location: Left arm Left arm  Left arm   Pulse: 98 102 97 94   Resp:  18  19   Temp:  97.9 °F (36.6 °C)  98 °F (36.7 °C)   TempSrc:  Oral  Oral   SpO2: 93% 92% 93% (!) 88%   Weight:       Height:           Oxygen Therapy  SpO2: (!) 88 %  O2 Device: CPAP  Mode: Spontaneous  O2 Flow Rate (L/min): 2 L/min  Pulse Oximetry Type: Continuous  Oximetry Probe Site Changed: No  Pulse Ox Probe Location: Right hand                  Intake/Output Summary (Last 24 hours) at 2025 0600  Last data filed at 2025 0157  Gross per 24 hour   Intake --   Output 800 ml   Net -800 ml       Scheduled Medication:Scheduled Medications[1]  Continuous Infusing Medication:Medication Infusions[2]    Lungs: clear to auscultation bilaterally  Heart: S1, S2 normal, no murmur, click, rub or gallop, regular rate and rhythm  Abdomen: soft, non-tender; bowel sounds normal; no masses,  no organomegaly  Extremities: extremities normal, atraumatic, no cyanosis or edema    Labs reviewed as noted below      ASSESSMENT/PLAN:    Acute pulmonary emboli and DVT - recurrent after stopping Coumadin. CT chest suggestive of right heart strain.  - Echo w/ septal dyssynergy and borderline elevated RVSP  - heparin gtt for now. Will need lifelong anticoagulation.   - cards following- plan for EKOS later today  LALO  - CPAP  Dispo  - will follow      Fam Gamble  [facility]  Pulmonary and Critical Care             [1] [2]    continuous dose heparin 2,000 Units/hr (25 0507)

## 2025-04-24 NOTE — PROCEDURES
[facility]       Faustino Bell Location: Cath Lab    CSN [id number] MRN [id number]   Admission Date 4/23/2025 Procedure Date 4/24/2025   Attending Physician Ildefonso Polanco DO Procedure Physician Ye Penn MD         CARDIAC CATHETERIZATION REPORT     PREOPERATIVE DIAGNOSIS:  sub-massive saddle pulmonary embolism  POSTOPERATIVE DIAGNOSIS:  same as above.  PROCEDURE PERFORMED:  right heart catheterization, right and left selective pulmonary angiography, aspiration thrombectomy of the right and left pulmonary arteries using the INARI Flow Triever system    OPERATORS: Ye Penn MD; Brigido Lowery MD      PROCEDURAL DESCRIPTION:  The patient was brought to the cardiac catheterization lab in the fasting state.  Informed consent was obtained.  Moderate sedation was employed using a total of IV Versed 6mg and IV fentanyl 200mcg.  I directly observed the patient [id number] to [id number], for a total of 50 minutes, and an independent trained observer was present and assisted in the monitoring of the patient's level of consciousness and physiological status, watching the heart rate, blood pressure, oximetry, and rhythm, in addition to total moderation time.   Lidocaine 1% was used to infiltrate the right femoral area for local anesthesia and the right femoral vein was accessed with a micropuncture needle under ultrasound guidance.  We upsized to an 8 [place]uan sheath over an .035 wire.  A MIV angled pigtail catheter was then advanced into right pulmonary artery, pulmonary arterial pressure was obtained.  The catheter was then removed over a 1 cm Amplatz stiff wire, which remained positioned in the right main PA.  The 8 [place]ar[place]uan sheath was then exchanged for the 24 [place] special INARI sheath which was placed in the inferior vena cava.  Once the sheath was placed, heparin anticoagulation was dosed at 80U/kg dosing.  The 24 [place] INARI Flow Triever catheter was then advanced into the right pulmonary artery.  We then performed  mechanical thrombectomy of material from the right pulmonary artery lower and upper branches- large burden of thrombus was extracted.  In the CASSIDY position a pulmonary angiogram was then performed without evidence of  significant residual thrombus.  We then pulled the catheter and wire back, which naturally flopped into the left main pulmonary artery.  We then performed mechanical thrombectomy of material from the left pulmonary artery, resulting in, again, a large amount of thrombus extraction. Left pulmonary angiography shows no significant/large burden focus of thrombus.  Decision was made to defer further aspiration with the T20 angled catheter.   The catheter and wire were removed from the body.  The sheath was then removed using a figure-of-eight stitch and cinched down with a FlowStasis suture tension device.   The procedure was tolerated well.     HEMODYNAMICS:     Pre-aspiration:   Pulmonary artery: 51/24/36 mmHg    Post right PA aspiration: 44/20/31 mmHg     Post left PA aspiration: 39/15/27 mmHg    MEDICATIONS:  See nursing record.     COMPLICATIONS:  No major complications were observed during this visit to the catheterization lab.     CONCLUSION: Successful mechanical thrombectomy of the right and left pulmonary arteries using the INARI Flow Triever System     RECOMMENDATIONS: FlowStasis device will be removed in four hours.  Continue heparin anticoagulation, with transition to oral DOAC prior to discharge.

## 2025-04-24 NOTE — PLAN OF CARE
Assumed care of patient at change of shift resting in bed. AO x4. NSR on tele monitor. O2 sat adequate on RA. Uses cpap at night. Denies chest pain and shortness of breath. Plan of care updated. Patient agreeable to cath lab today. Plan of care updated. Bed locked, in lowest position. Call light and personal items in reach. All needs met.  POC:  - heparin gtt  - cath lab today    Problem: RESPIRATORY - ADULT  Goal: Achieves optimal ventilation and oxygenation  Description: INTERVENTIONS:- Assess for changes in respiratory status- Assess for changes in mentation and behavior- Position to facilitate oxygenation and minimize respiratory effort- Oxygen supplementation based on oxygen saturation or ABGs- Provide Smoking Cessation handout, if applicable- Encourage broncho-pulmonary hygiene including cough, deep breathe, Incentive Spirometry- Assess the need for suctioning and perform as needed- Assess and instruct to report SOB or any respiratory difficulty- Respiratory Therapy support as indicated- Manage/alleviate anxiety- Monitor for signs/symptoms of CO2 retention  Outcome: Progressing     Problem: Patient/Family Goals  Goal: Patient/Family Long Term Goal  Description: Patient's Long Term Goal: Pt will go home   Interventions:  - MD to see  - heparin gtt  Outcome: Progressing  Goal: Patient/Family Short Term Goal  Description: Patient's Short Term Goal:patient will feel better, reduced shortness of breath   Interventions;   - MD to see  - heparin gtt  - angiogram- targeted lytics  Outcome: Progressing

## 2025-04-24 NOTE — PLAN OF CARE
NURSING ADMISSION NOTE      Patient admitted via Cart from ED/ultrasound approx at 1930.   Oriented to room.  Safety precautions initiated.  Bed alarm on & in low position.  Call light in reach.  Skin assessment completed with PCT, redness to BLE hx of lymphedema. No other skin issues identified    Patient alert and oriented x4  Patient on room air, pulse ox and tele applied. Uses CPAP at night. Spo2 >88%.. Dyspnea with rest & exertion. Heparin gtt infusing for PE/DVT protocol.   Denies any cardiac symptoms, NSR with HR between 90s-l10s on tele.  Continent of bowel and bladder.  Bedrest at this time, verified with on call hospitalist if okay with bathroom privileges. Pt tolerating care. All questions addressed.     Problem: RESPIRATORY - ADULT  Goal: Achieves optimal ventilation and oxygenation  Description: INTERVENTIONS:- Assess for changes in respiratory status- Assess for changes in mentation and behavior- Position to facilitate oxygenation and minimize respiratory effort- Oxygen supplementation based on oxygen saturation or ABGs- Provide Smoking Cessation handout, if applicable- Encourage broncho-pulmonary hygiene including cough, deep breathe, Incentive Spirometry- Assess the need for suctioning and perform as needed- Assess and instruct to report SOB or any respiratory difficulty- Respiratory Therapy support as indicated- Manage/alleviate anxiety- Monitor for signs/symptoms of CO2 retention  Outcome: Progressing     Problem: Patient/Family Goals  Goal: Patient/Family Long Term Goal  Description: Patient's Long Term Goal: Pt will go home   Interventions:  - MD to see  - heparin gtt    Outcome: Progressing  Goal: Patient/Family Short Term Goal  Description: Patient's Short Term Goal:patient will feel better, reduced shortness of breath   Interventions;   - MD to see  - heparin gtt  Outcome: Progressing     POC:  - heparin gtt

## 2025-04-24 NOTE — PROGRESS NOTES
[facility] Cardiology Progress Note        Faustino Bell Patient Status:  Inpatient    1970 MRN [id number]   Location [facility] 8NE-A Attending Ildefonso Polanco,    Hosp Day # 1 PCP Orlando Goodson MD     Subjective:  The patient denies  chest pain and shortness of breath at rest    Medications:  Scheduled Medications[1]    Continuous Infusions:  Medication Infusions[2]      Allergies:  Allergies[3]      Objective:        Intake/Output:      Intake/Output Summary (Last 24 hours) at 2025 0720  Last data filed at 2025 0157  Gross per 24 hour   Intake --   Output 800 ml   Net -800 ml     Wt Readings from Last 3 Encounters:   25 (!) 450 lb (204.1 kg)   24 (!) 432 lb (196 kg)   21 (!) 470 lb (213.2 kg)       Physical Exam:        Vitals:    25 2336 25 0016 25 0157 25 0431   BP: 136/70 151/85  122/69   BP Location: Left arm Left arm  Left arm   Pulse: 98 102 97 94   Resp:  18  19   Temp:  97.9 °F (36.6 °C)  98 °F (36.7 °C)   TempSrc:  Oral  Oral   SpO2: 93% 92% 93% (!) 88%   Weight:       Height:           Temp:  [97.9 °F (36.6 °C)-98.5 °F (36.9 °C)] 98 °F (36.7 °C)  Pulse:  [] 94  Resp:  [13-24] 19  BP: (116-168)/() 122/69  SpO2:  [85 %-100 %] 88 %      Temp: 98 °F (36.7 °C)  Pulse: 94  Resp: 19  BP: 122/69  General:  Appears comfortable  HEENT: No focal deficits.  Neck: No JVD, carotids 2+ no bruits.  Cardiac: Regular S1S2.  No S3, S4, rub, click.  No murmur.  Lungs: Clear to auscultation and percussion.  Abdomen: Soft, non-tender.   Extremities: No LE edema.  No clubbing or cyanosis.    Neurologic: Alert and oriented, normal affect.  Skin: Warm and dry.           LABS:      HEM:  Recent Labs   Lab 25  1316 25  0429   WBC 8.8 8.9   HGB 12.6* 11.9*   HCT 40.4 37.7*   .0 182.0       Chem:  Recent Labs   Lab 25  1316 25  0429    144   K 4.0 3.6    107   CO2 26.0 28.0   BUN 7* 9    CREATSERUM 1.09 0.96   CA 9.1 8.7   * 135*       Recent Labs   Lab 04/23/25  1316 04/24/25  0429   ALT 21 18   AST 24 20   ALB 4.3 4.1       Recent Labs   Lab 04/23/25  1316 04/23/25  2131 04/24/25  0430   PTT 24.9 42.4* 53.4*   INR 1.08  --   --            No results found for: \"TROP\", \"CKMB\"      Invalid input(s): \"PBNPML\"                       Diagnostics:   Telemetry: SR    EKG, 4/24/2025,         Echo: 4/23/25    Conclusions:     1. Left ventricle: The cavity size was below normal. Wall thickness was      mildly increased. Systolic function was normal. The estimated ejection      fraction was 55-60%, by visual assessment. No diagnostic evidence for      regional wall motion abnormalities. Doppler parameters are consistent      with abnormal left ventricular relaxation - grade 1 diastolic      dysfunction.   2. Right ventricle: The cavity size was increased. Systolic function was      reduced. The tricuspid annular plane systolic excursion (TAPSE) is      1.30cm.   3. Ventricular septum: Septal motion was dyssynergic.   4. Left atrium: The left atrial volume was normal.   5. Aortic root: The aortic root was 4.0cm diameter. The aortic root was      mildly dilated.   6. Ascending aorta: The ascending aorta was 3.6cm diameter.   7. Pulmonary arteries: Systolic pressure was at the upper limits of normal,      in the range of 30mm Hg to 35mm Hg.   Impressions:  This study is compared with previous dated 3/30/2024:   *                 Impression:    EKG, 4/23/2025:  /min.  Q III, old     CXR, 4/23/2025:       CT chest, 4/23/25  CONCLUSION:    1. Large bilateral pulmonary embolism with CT findings suggestive of right heart strain, recommend correlation to echo.   2. 4.8 cm left thyroid lobe nodule.  Dedicated thyroid ultrasound is recommended.      Impression:     1.  Saddle PE. . The PE is recurrent. He exhibits stable hemodynamics and is comfortable at rest, however echo shows RV strain   2. Morbid  obesity  3. LALO  4. Hypothyroidism        Recommend:     1.  Telemetry  2. IV heparin transitioning to coumadin.   3. Consider heme consult: is he a candidate for DOAC given his weight.  Should he undergo hypercoag eval?  4. I suspect he will need life-long OAC  5. Given RV strain and size of PE I suggested he consider directed lytics to reduce long-term RV sequelae. Risks were discussed. He plans to talk it over with his spouse         Pineda Le MD  4/24/2025  7:20 AM         [1] [2]    continuous dose heparin 2,200 Units/hr (04/24/25 0607)   [3] No Known Allergies

## 2025-04-24 NOTE — PROGRESS NOTES
[facility] Hospitalist Progress Note     Subjective:  No acute events. On room air this morning, no chest pain or dyspnea. Discussed with patient and his wife on the phone- now agreeable for thrombolysis today.     Review of Systems  Comprehensive ROS reviewed and negative except for what is stated in HPI.      OBJECTIVE:  /88 (BP Location: Left arm)   Pulse 106   Temp 98 °F (36.7 °C) (Oral)   Resp 20   Ht 6' (1.829 m)   Wt (!) 450 lb (204.1 kg)   SpO2 (!) 89%   BMI 61.03 kg/m²   General: No apparent distress.  Alert and oriented.  HEENT:  EOMI, PERRLA.  Pulm: Clear breath sounds bilaterally.  Normal respiratory effort.  CV: Regular rate and rhythm, no murmur.   Abd: Obese abdomen, nontender, nondistended.   MSK: Peripheral edema bilaterally.   Skin: No lesions or rashes.  Neuro: No obvious focal deficits.      Data Review:    LABS:   Lab Results   Component Value Date    WBC 8.9 04/24/2025    HGB 11.9 04/24/2025    HCT 37.7 04/24/2025    .0 04/24/2025    CREATSERUM 0.96 04/24/2025    BUN 9 04/24/2025     04/24/2025    K 3.6 04/24/2025     04/24/2025    CO2 28.0 04/24/2025     04/24/2025    CA 8.7 04/24/2025    ALB 4.1 04/24/2025    ALKPHO 49 04/24/2025    BILT 1.1 04/24/2025    TP 7.4 04/24/2025    AST 20 04/24/2025    ALT 18 04/24/2025    PTT 53.4 04/24/2025    INR 1.08 04/23/2025    PTP 14.1 04/23/2025       All lab work and imaging personally reviewed.    Assessment/Plan:     Assessment/ Plan:     #AHRF 2/2 saddle PE with evidence of right heart strain  #Right popliteal DVT- chronic?  -currently on room air, monitor spo2  -plan for thrombolysis today- discussed with patient bedside   -heparin gtt- previously on coumadin , will consult hematology for hypercoag workup and see if  DOAC is an option.   -trend labs      #LALO  -CPAP nightly     #Hypothyroidism  -resume home levothyroxine once verified     DVT ppx: heparin gtt  Diet: ADAT  Disposition: inpatient       Outpatient records or previous hospital records reviewed.   [facility] hospitalist to continue to follow patient while in house.     Ildefonsonaz Polanco DO  [facility]ist

## 2025-04-24 NOTE — PAYOR COMM NOTE
--------------  ADMISSION REVIEW   4/23-4/24  Payor: [employer] [facility]  Subscriber #:  [id number]  Authorization Number: [id number]    Admit date: 4/23/25  Admit time:  7:47 PM       REVIEW DOCUMENTATION:     ED Provider Notes        ED Provider Notes signed by Raimundo Holt MD at 4/23/2025  3:25 PM       Author: Raimundo Holt MD Service: Emergency Medicine Author Type: Physician    Filed: 4/23/2025  3:25 PM Date of Service: 4/23/2025  3:09 PM Status: Signed    : Raimundo Holt MD (Physician)           Patient Seen in: [facility] Emergency Department      History     Chief Complaint   Patient presents with    Shortness Of Breath     Stated Complaint: SOB with exerction. Slight cough.  hx of % on RA    Subjective:   HPI  This is a 51-year-old gentleman history of previous pulmonary embolism, off Coumadin for the last 6 weeks, here for evaluation of shortness of breath with exertion over the past 2 days.  Denies any chest pain any fevers any new cough.  Has chronic lymphedema to the bilateral lower extremities no change recently.  No other complaints or concerns at this time.         Objective:     Past Medical History:    Arrhythmia    hospitalized 2007 fast heart rate    Hypothyroid    Obstructive apnea    Guly HST AHI 33    Prediabetes    Unspecified sleep apnea     Physical Exam     ED Triage Vitals [04/23/25 1144]   BP (!) 150/101   Pulse 105   Resp 24   Temp 98.5 °F (36.9 °C)   Temp src Oral   SpO2 (!) 85 %   O2 Device None (Room air)       Current Vitals:   Vital Signs  BP: (!) 144/94  Pulse: 103  Resp: 14  Temp: 98.5 °F (36.9 °C)  Temp src: Oral  MAP (mmHg): (!) 108    Oxygen Therapy  SpO2: 98 %  O2 Device: Nasal cannula  O2 Flow Rate (L/min): 4 L/min      Physical Exam  Vitals signs and nursing note reviewed.   General:  Patient laying supine in the bed in no acute distress  Head: Normocephalic and atraumatic.   HEENT:  Mucous membranes are moist.   Cardiovascular:  Normal rate and regular  rhythm.  Brawny edema bilateral lower extremities  Pulmonary:  Pulmonary effort is normal.  Normal breath sounds. No wheezing, rhonchi or rales.   Abdominal: Soft nontender nondistended, normal bowel sounds, no guarding no rebound tenderness  Skin: Warm and dry  Neurological: Awake alert, speech is normal    ED Course     Labs Reviewed   CBC WITH DIFFERENTIAL WITH PLATELET - Abnormal; Notable for the following components:       Result Value    HGB 12.6 (*)     MCV 79.7 (*)     MCH 24.9 (*)     All other components within normal limits   COMP METABOLIC PANEL (14) - Abnormal; Notable for the following components:    Glucose 156 (*)     BUN 7 (*)     Globulin  3.8 (*)     All other components within normal limits   PROTHROMBIN TIME (PT) - Normal   PTT, ACTIVATED - Normal   PRO BETA NATRIURETIC PEPTIDE   TROPONIN I HIGH SENSITIVITY   RAINBOW DRAW LAVENDER   RAINBOW DRAW LIGHT GREEN   RAINBOW DRAW BLUE   RAINBOW DRAW GOLD     EKG    Rate, intervals and axes as noted on EKG Report.  Rate: 100  Rhythm: Sinus Rhythm  Reading: No acute ischemic changes      CTA CHEST (CPT=71275)  Result Date: 4/23/2025  PROCEDURE:  CT ANGIOGRAPHY, CHEST (CPT=71275)  COMPARISON:  [facility] , CT, CT ANGIOGRAPHY, CHEST (CPT=71275), 3/29/2024, 4:03 PM.  INDICATIONS:  SOB with exerction. Slight cough.  hx of % on RA  TECHNIQUE:  IV contrast-enhanced multislice CT angiography is performed through the pulmonary arterial anatomy. 3D volume renderings are generated.  Dose reduction techniques were used. Dose information is transmitted to the ACR (American College of Radiology) NRDR (National Radiology Data Registry) which includes the Dose Index Registry.  PATIENT STATED HISTORY:(As transcribed by Technologist)  Patient states shortness of breath, cough, history of PE.   CONTRAST USED:  100cc of Isovue 370  FINDINGS:  VASCULATURE:  There is saddle pulmonary embolus extending from the bilateral mainstem bronchi into the lobar, segmental and  subsegmental vessels within all 5 lobes.  Increased RV to LV ratio which could reflect underlying right heart strain, recommend correlation to echo. THORACIC AORTA:  No aneurysm or visible dissection.  LUNGS:  No visible pulmonary disease.  MEDIASTINUM:  No adenopathy or mass.  4.8 cm left thyroid lobe nodule. JOSE:  No mass or adenopathy.  CARDIAC:  No enlargement, pericardial thickening, or significant coronary artery calcification.  Increased RV to LV ratio PLEURA:  No mass or effusion.  CHEST WALL:  No mass or axillary adenopathy.  LIMITED ABDOMEN:  Limited images of the upper abdomen are unremarkable.  BONES:  Degenerative changes of the spine. OTHER:  Negative.             CONCLUSION:  1. Large bilateral pulmonary embolism with CT findings suggestive of right heart strain, recommend correlation to echo. 2. 4.8 cm left thyroid lobe nodule.  Dedicated thyroid ultrasound is recommended.  COMMUNICATION:  The findings were communicated with Dr. Holt at 1458 on 4/23/2025. There was appropriate read back.    LOCATION:  [id number]   Dictated by (CST): Gopal Reese MD on 4/23/2025 at 2:55 PM     Finalized by (CST): Gopal Reese MD on 4/23/2025 at 2:58 PM       XR CHEST AP PORTABLE  (CPT=71045)  Result Date: 4/23/2025  PROCEDURE:  XR CHEST AP PORTABLE  (CPT=71045)  TECHNIQUE:  AP chest radiograph was obtained.  COMPARISON:  [facility] , XR, XR CHEST AP PORTABLE  (CPT=71045), 3/29/2024, 1:25 PM.  INDICATIONS:  SOB with exerction. Slight cough.  hx of % on RA  PATIENT STATED HISTORY: (As transcribed by Technologist)  Patient stated he has been having difficulty breathing when walking.    FINDINGS:  Cardiomegaly with normal pulmonary vascularity. No pleural effusion or pneumothorax. No lobar consolidation.  Degenerative changes in the spine.            CONCLUSION:  No active cardiopulmonary process identified.   LOCATION:  [id number]      Dictated by ([facility]): Joce Allen MD on 4/23/2025 at 12:55 PM     Finalized by (CST):  Joce Allen MD on 4/23/2025 at 12:55 PM         MDM      53-year-old gentleman here for evaluation of exertional dyspnea over the past 2 days history of previous pulmonary embolism, anticoagulation stopped 6 weeks ago.  Differential includes acute pulmonary embolism ACS, pneumonia pneumothorax.  Patient is afebrile hemodynamically stable does have 2 L oxygen requirement blood pressure 140s over 90s.  CT of the chest was obtained shows extensive bilateral pulmonary emboli with right heart strain.  Patient started on heparin drip we are waiting biomarkers, will be admitted to CTU for further monitoring evaluation pulmonary consulted will discuss cardiology case endorsed to the [facility] Hospitalist who agrees with plan for admission.    Troponin 108, discussed with Dr. Le from cardiology    I independently viewed and interpreted the following imaging: CT chest shows extensive bilateral pulmonary emboli  Admission disposition: 4/23/2025  2:59 PM    Total critical care time: Approximately 75 minutes  Due to a high probability of clinically significant, life threatening deterioration, the patient required my highest level of preparedness to intervene emergently and I personally spent this critical care time directly and personally managing the patient. This critical care time included obtaining a history; examining the patient; pulse oximetry; ordering and review of studies; arranging urgent treatment with development of a management plan; evaluation of patient's response to treatment; frequent reassessment; and, discussions with other providers.  This critical care time was performed to assess and manage the high probability of imminent, life-threatening deterioration that could result in multi-organ failure. It was exclusive of separately billable procedures and treating other patients and teaching time.    Disposition and Plan     Clinical Impression:  1. Acute saddle pulmonary embolism with acute cor  pulmonale (HCC)    2. Dyspnea, unspecified type         Disposition:  Admit  4/23/2025  2:59 pm     Hospital Problems       Present on Admission  Date Reviewed: 11/17/2021          ICD-10-CM Noted POA    * (Principal) Acute saddle pulmonary embolism with acute cor pulmonale (HCC) I26.02 4/23/2025 Unknown               4/23 H&P    History of Present Illness: Patient is a 54 year old male with history of LALO on CPAP, PE/ DVT (recently off coumadin), morbid obesity who presented for acute dyspnea. No chest pain. Describes symptoms mostly exertional. No recent travel.      In the ER, hypertensive and hypoxic requiring 4 L NC. CTA with saddle PE with evidence of right heart strain. Remaining lab work fairly unremarkable. Patient started on heparin gtt and admitted for further management.       Assessment/ Plan:      #AHRF 2/2 saddle PE with evidence of right heart strain  -obtain stat echo and LE duplex  -heparin gtt- previously on coumadin stopped over last month  -monitor hemodynamics and wean supp oxygen as tolerated  -further recs per cardiology and pulmonary regarding catheter direct thrombolysis.      #LALO  -CPAP nightly     #Hypothyroidism  -resume home levothyroxine once verified     DVT ppx: heparin gtt  Diet: ADAT  Disposition: inpatient         4/23 Pulm          Assessment / Plan:  Acute pulmonary emboli - recurrent after stopping Coumadin. CT chest suggestive of right heart strain.  - TTE  - LE dopplers  - heparin gtt for now. Will need lifelong anticoagulation.   - consider catheter directed lytics pending above workup; cardiology to see  LALO  - CPAP  Dispo  - will follow     Calin Zhang MD  Pulmonary and Critical Care Medicine        History of Present Illness:   Mr. Bell is a 54 year old with history of PE/DVT and LALO on CPAP who we are asked to see for acute PE. Patient has a history of provoked PE/DVT in the setting of COVID infection. He was treated for a year with Coumadin and stopped Coumadin  about one month ago. He now presents with worsening chest pain and dyspnea for the last several days. He notes chronic LE lymphedema that is no worse than usual. No recent travel. He works from home and is relatively sedentary. He notes his mom had a history of blood clots.           4/23 Cardiology    Reason for Consultation:  PE        History of Present Illness:  Faustino Bell is a a(n) 54 year old male. He has hypothyroidism, LALO, morbid obesity.  He has a hx of PE/DVT 3/2024. Used warfarin therefate until 6 weeks ago when it was changed to aspirin.  He reports MARCOS with walking 5 feet for last 2 days.  No chest pain.  No changes in LE edema which is chronic.  No travel/trauma/surgery.  No rest dyspnea.  Came to ER.  CTA chest shows a saddle PE. Remains comfortable at rest.  IV heparin has been started.  Troponin is 108. Initial O2 sat = 85%      Impression:     1.  Saddle PE. . The PE is recurrent. He exhibits stable hemodynamics and is comfortable at rest.   2. Morbid obesity  3. LALO  4. Hypothyroidism        Recommend:     1.  Telemetry  2. IV heparin transitioning to coumadin.   3. Consider heme consult: is he a candidate for DOAC given his weight.  Should he undergo hypercoag eval?  4. I suspect he will need life-long OAC  5. Echo for RV fct and PA pressure            4/24 Pulm      Last 24hrs: No events overnight, still w/ some conversational dyspnea       Oxygen Therapy  SpO2: (!) 88 %  O2 Device: CPAP  Mode: Spontaneous  O2 Flow Rate (L/min): 2 L/min  Pulse Oximetry Type: Continuous      ASSESSMENT/PLAN:     Acute pulmonary emboli and DVT - recurrent after stopping Coumadin. CT chest suggestive of right heart strain.  - Echo w/ septal dyssynergy and borderline elevated RVSP  - heparin gtt for now. Will need lifelong anticoagulation.   - cards following- plan for EKOS later today  LALO  - CPAP          4/24 Cardiology    Impression:     1.  Saddle PE. . The PE is recurrent. He exhibits stable hemodynamics  and is comfortable at rest, however echo shows RV strain   2. Morbid obesity  3. LALO  4. Hypothyroidism        Recommend:     1.  Telemetry  2. IV heparin transitioning to coumadin.   3. Consider heme consult: is he a candidate for DOAC given his weight.  Should he undergo hypercoag eval?  4. I suspect he will need life-long OAC  5. Given RV strain and size of PE I suggested he consider directed lytics to reduce long-term RV sequelae. Risks were discussed. He plans to talk it over with his spouse        4/24    CARDIAC CATHETERIZATION REPORT     PREOPERATIVE DIAGNOSIS:  sub-massive saddle pulmonary embolism  POSTOPERATIVE DIAGNOSIS:  same as above.  PROCEDURE PERFORMED:  right heart catheterization, right and left selective pulmonary angiography, aspiration thrombectomy of the right and left pulmonary arteries using the INARI Flow Triever system    COMPLICATIONS:  No major complications were observed during this visit to the catheterization lab.     CONCLUSION: Successful mechanical thrombectomy of the right and left pulmonary arteries using the INARI Flow Triever System     RECOMMENDATIONS: FlowStasis device will be removed in four hours.  Continue heparin anticoagulation, with transition to oral DOAC prior to discharge.    MEDICATIONS ADMINISTERED IN LAST 1 DAY:  acetaminophen (Tylenol Extra Strength) tab 500 mg       Date Action Dose Route User    4/23/2025 2330 Given 500 mg Oral Yoni Gaxiola RN          heparin (Porcine) 1000 UNIT/ML injection - BOLUS IV 10,300 Units       Date Action Dose Route User    4/23/2025 1536 Given 10,300 Units Intravenous Jose Mcgovern RN          heparin (Porcine) 1000 UNIT/ML injection - BOLUS IV 6,100 Units       Date Action Dose Route User    4/23/2025 2238 Given 6,100 Units Intravenous Yoni Gaxiola RN          heparin (Porcine) 1000 UNIT/ML injection - BOLUS IV 6,100 Units       Date Action Dose Route User    4/24/2025 0634 Given 6,100 Units Intravenous Minor  DOMINGUEZ Vallejo          heparin (Porcine) 99904 units/250mL infusion PE/DVT/THROMBUS CONTINUOUS       Date Action Dose Route User    4/24/2025 0634 Hi-Risk Rate/Dose Change 2,200 Units/hr Intravenous Yoni Gaxiola RN    4/24/2025 0507 New Bag 2,000 Units/hr Intravenous Yoni Gaxiola RN    4/23/2025 2238 Hi-Risk Rate/Dose Change 2,000 Units/hr Intravenous Yoni Gaxiola RN          heparin (Porcine) 13338 units/250 mL infusion ED (PE/DVT/THROMBUS) INITIAL DOSE       Date Action Dose Route User    4/23/2025 1536 New Bag 18 Units/kg/hr × 128.2 kg (Adjusted) Intravenous Jose Mcgovern RN          iopamidol 76% (ISOVUE-370) injection for power injector       Date Action Dose Route User    4/23/2025 1447 Given 100 mL Intravenous Delio Hensley          iopamidol 76% (ISOVUE-370) injection for power injector       Date Action Dose Route User    4/24/2025 1320 Given 20 mL Injection Ye Penn MD          Perflutren Lipid Microsphere (DEFINITY) 6.52 MG/ML injection 1.5 mL       Date Action Dose Route User    4/23/2025 1651 Given 1.5 mL Intravenous Bonita Kennedy            Vitals (last day)       Date/Time Temp Pulse Resp BP SpO2 Weight O2 Device O2 Flow Rate (L/min) [facility]    04/24/25 1339 98.1 °F (36.7 °C) 92 19 156/98 93 % -- None (Room air) --     04/24/25 0830 -- 106 -- -- 89 % -- -- --     04/24/25 0800 98 °F (36.7 °C) 93 20 145/88 91 % -- None (Room air) --     04/24/25 0431 98 °F (36.7 °C) 94 19 122/69 88 % -- CPAP --     04/24/25 0157 -- 97 -- -- 93 % -- -- --     04/24/25 0016 97.9 °F (36.6 °C) 102 18 151/85 92 % -- None (Room air) --     04/23/25 2336 -- 98 -- 136/70 93 % -- Nasal cannula 2 L/min     04/23/25 1800 -- 98 19 159/98 100 % -- Nasal cannula 4 L/min     04/23/25 1730 -- 100 14 168/104 94 % -- Nasal cannula 4 L/min     04/23/25 1630 -- 102 22 166/105 100 % -- Nasal cannula 4 L/min     04/23/25 1600 -- 101 23 147/106 91 % -- Nasal cannula --     04/23/25 1445 -- 103 14  144/94 98 % -- Nasal cannula 4 L/min     04/23/25 1415 -- 104 13 116/77 94 % -- Nasal cannula 4 L/min     04/23/25 1215 -- 106 22 152/93 95 % -- Nasal cannula 4 L/min     04/23/25 1144 98.5 °F (36.9 °C) 105 24 150/101 85 % 450 lb (204.1 kg) None (Room air) --           CIWA Scores (since admission)       None

## 2025-04-25 LAB
ANION GAP SERPL CALC-SCNC: 10 MMOL/L (ref 0–18)
APTT PPP: 41.8 SECONDS (ref 23–36)
APTT PPP: 45.4 SECONDS (ref 23–36)
APTT PPP: 50.5 SECONDS (ref 23–36)
APTT PPP: 53.7 SECONDS (ref 23–36)
ATRIAL RATE: 100 BPM
BUN BLD-MCNC: 8 MG/DL (ref 9–23)
CALCIUM BLD-MCNC: 9 MG/DL (ref 8.7–10.6)
CHLORIDE SERPL-SCNC: 108 MMOL/L (ref 98–112)
CO2 SERPL-SCNC: 23 MMOL/L (ref 21–32)
CREAT BLD-MCNC: 0.89 MG/DL (ref 0.7–1.3)
EGFRCR SERPLBLD CKD-EPI 2021: 102 ML/MIN/1.73M2 (ref 60–?)
ERYTHROCYTE [DISTWIDTH] IN BLOOD BY AUTOMATED COUNT: 18.7 %
GLUCOSE BLD-MCNC: 147 MG/DL (ref 70–99)
HCT VFR BLD AUTO: 38.1 % (ref 39–53)
HGB BLD-MCNC: 12 G/DL (ref 13–17.5)
INR BLD: 1.07 (ref 0.8–1.2)
MAGNESIUM SERPL-MCNC: 2.1 MG/DL (ref 1.6–2.6)
MCH RBC QN AUTO: 25 PG (ref 26–34)
MCHC RBC AUTO-ENTMCNC: 31.5 G/DL (ref 31–37)
MCV RBC AUTO: 79.4 FL (ref 80–100)
OSMOLALITY SERPL CALC.SUM OF ELEC: 293 MOSM/KG (ref 275–295)
P AXIS: 10 DEGREES
P-R INTERVAL: 176 MS
PLATELET # BLD AUTO: 192 10(3)UL (ref 150–450)
PLATELETS.RETICULATED NFR BLD AUTO: 2.2 % (ref 0–7)
POTASSIUM SERPL-SCNC: 4 MMOL/L (ref 3.5–5.1)
POTASSIUM SERPL-SCNC: 4.3 MMOL/L (ref 3.5–5.1)
PROTHROMBIN TIME: 14 SECONDS (ref 11.6–14.8)
Q-T INTERVAL: 370 MS
QRS DURATION: 96 MS
QTC CALCULATION (BEZET): 477 MS
R AXIS: 59 DEGREES
RBC # BLD AUTO: 4.8 X10(6)UL (ref 4.3–5.7)
SODIUM SERPL-SCNC: 141 MMOL/L (ref 136–145)
T AXIS: 26 DEGREES
VENTRICULAR RATE: 100 BPM
WBC # BLD AUTO: 7.8 X10(3) UL (ref 4–11)

## 2025-04-25 PROCEDURE — 85610 PROTHROMBIN TIME: CPT | Performed by: INTERNAL MEDICINE

## 2025-04-25 PROCEDURE — 80048 BASIC METABOLIC PNL TOTAL CA: CPT | Performed by: STUDENT IN AN ORGANIZED HEALTH CARE EDUCATION/TRAINING PROGRAM

## 2025-04-25 PROCEDURE — 83735 ASSAY OF MAGNESIUM: CPT | Performed by: STUDENT IN AN ORGANIZED HEALTH CARE EDUCATION/TRAINING PROGRAM

## 2025-04-25 PROCEDURE — 85027 COMPLETE CBC AUTOMATED: CPT | Performed by: STUDENT IN AN ORGANIZED HEALTH CARE EDUCATION/TRAINING PROGRAM

## 2025-04-25 PROCEDURE — 85730 THROMBOPLASTIN TIME PARTIAL: CPT | Performed by: INTERNAL MEDICINE

## 2025-04-25 PROCEDURE — 85730 THROMBOPLASTIN TIME PARTIAL: CPT | Performed by: STUDENT IN AN ORGANIZED HEALTH CARE EDUCATION/TRAINING PROGRAM

## 2025-04-25 PROCEDURE — 84132 ASSAY OF SERUM POTASSIUM: CPT | Performed by: STUDENT IN AN ORGANIZED HEALTH CARE EDUCATION/TRAINING PROGRAM

## 2025-04-25 RX ORDER — WARFARIN SODIUM 7.5 MG/1
7.5 TABLET ORAL
Status: COMPLETED | OUTPATIENT
Start: 2025-04-25 | End: 2025-04-25

## 2025-04-25 RX ORDER — HEPARIN SODIUM 1000 [USP'U]/ML
30 INJECTION, SOLUTION INTRAVENOUS; SUBCUTANEOUS ONCE
Status: COMPLETED | OUTPATIENT
Start: 2025-04-25 | End: 2025-04-25

## 2025-04-25 RX ORDER — HEPARIN SODIUM 1000 [USP'U]/ML
30 INJECTION, SOLUTION INTRAVENOUS; SUBCUTANEOUS ONCE
Status: DISCONTINUED | OUTPATIENT
Start: 2025-04-25 | End: 2025-04-25

## 2025-04-25 NOTE — PROGRESS NOTES
[facility] Cardiology Progress Note        Faustino Bell Patient Status:  Inpatient    1970 MRN [id number]   Location [facility] 8NE-A Attending Ildefonso Polanco,    Hosp Day # 2 PCP Orlando Goodson MD          Impression:     1.  Saddle PE, RV strain s/p Inari aspiration thrombectomy with large clot removal (25)    2. recurrent PE (3/24), occurred 6 weeks after coumadin cessation--> restarted coumadin therapy, will be indefinite therapy, hematology following    3. Morbid obesity BMI 61 (450#)    4. LALO    5. Hypothyroidism        Recommend:     1.  PE: stable s/p aspiration thrombectomy.  mild oozing from RFV site, Hg stable- monitor.  Repeat limited TTE prior to dc to re-assess RV function/RVSP (although mPA improved 36-->27mmHg post aspiration).    2. AC: Re-started coumadin today, indefinite therapy considering unprovoked 2nd VTE event, this time saddle/large thrombus burden.  DOAC and lovenox may be unreliably therapeutic based on weight.  If so, likely need to stay until INR therapeutic while on heparin gtt.  Appreciate hematology input regarding this issues.  Also consider hypercoagulability work-up.       Subjective:  The patient denies  chest pain and shortness of breath at rest, RA. mild oozing from R groin.      Medications:  Scheduled Medications[1]    Continuous Infusions:  Medication Infusions[2]      Allergies:  Allergies[3]      Objective:        Intake/Output:      Intake/Output Summary (Last 24 hours) at 2025 1447  Last data filed at 2025 1300  Gross per 24 hour   Intake 1606.54 ml   Output 1 ml   Net 1605.54 ml     Wt Readings from Last 3 Encounters:   25 (!) 450 lb (204.1 kg)   24 (!) 432 lb (196 kg)   21 (!) 470 lb (213.2 kg)       Physical Exam:        Vitals:    25 2339 25 0538 25 0742 25 1201   BP: 103/68 123/66 159/90 109/84   BP Location: Radial Left arm Left arm Left arm   Pulse: 89 94 87 88    Resp: 18 18 23 20   Temp: 98 °F (36.7 °C) 98.3 °F (36.8 °C) 98.3 °F (36.8 °C) 97.4 °F (36.3 °C)   TempSrc: Oral Oral Oral Oral   SpO2: 96% 91% 95% 96%   Weight:       Height:           Temp:  [97.4 °F (36.3 °C)-98.4 °F (36.9 °C)] 97.4 °F (36.3 °C)  Pulse:  [] 88  Resp:  [17-23] 20  BP: (103-167)/(66-98) 109/84  SpO2:  [91 %-96 %] 96 %      Temp: 97.4 °F (36.3 °C)  Pulse: 88  Resp: 20  BP: 109/84  General:  Appears comfortable  HEENT: No focal deficits.  Neck: No JVD, carotids 2+ no bruits.  Cardiac: Regular S1S2.  No S3, S4, rub, click.  No murmur.  Lungs: Clear to auscultation and percussion.  Abdomen: Soft, non-tender.   Extremities: No LE edema.  No clubbing or cyanosis.    Neurologic: Alert and oriented, normal affect.  Skin: Warm and dry.           LABS:      HEM:  Recent Labs   Lab 04/23/25  1316 04/24/25  0429 04/25/25  0854   WBC 8.8 8.9 7.8   HGB 12.6* 11.9* 12.0*   HCT 40.4 37.7* 38.1*   .0 182.0 192.0       Chem:  Recent Labs   Lab 04/23/25  1316 04/24/25  0429 04/25/25  0018 04/25/25  0855    144  --  141   K 4.0 3.6 4.0 4.3    107  --  108   CO2 26.0 28.0  --  23.0   BUN 7* 9  --  8*   CREATSERUM 1.09 0.96  --  0.89   CA 9.1 8.7  --  9.0   MG  --   --   --  2.1   * 135*  --  147*       Recent Labs   Lab 04/23/25  1316 04/24/25  0429   ALT 21 18   AST 24 20   ALB 4.3 4.1       Recent Labs   Lab 04/23/25  1316 04/23/25  2131 04/24/25  0430 04/24/25  1631 04/25/25  0018 04/25/25  0854   PTT 24.9 42.4* 53.4* 165.8* 41.8* 45.4*   INR 1.08  --   --   --   --   --            No results found for: \"TROP\", \"CKMB\"      Invalid input(s): \"PBNPML\"                       Diagnostics:   Telemetry: SR    EKG, 4/24/2025,         Echo: 4/23/25    Conclusions:     1. Left ventricle: The cavity size was below normal. Wall thickness was      mildly increased. Systolic function was normal. The estimated ejection      fraction was 55-60%, by visual assessment. No diagnostic evidence for       regional wall motion abnormalities. Doppler parameters are consistent      with abnormal left ventricular relaxation - grade 1 diastolic      dysfunction.   2. Right ventricle: The cavity size was increased. Systolic function was      reduced. The tricuspid annular plane systolic excursion (TAPSE) is      1.30cm.   3. Ventricular septum: Septal motion was dyssynergic.   4. Left atrium: The left atrial volume was normal.   5. Aortic root: The aortic root was 4.0cm diameter. The aortic root was      mildly dilated.   6. Ascending aorta: The ascending aorta was 3.6cm diameter.   7. Pulmonary arteries: Systolic pressure was at the upper limits of normal,      in the range of 30mm Hg to 35mm Hg.   Impressions:  This study is compared with previous dated 3/30/2024:   *                 Impression:    EKG, 4/23/2025:  /min.  Q III, old     CXR, 4/23/2025:       CT chest, 4/23/25  CONCLUSION:    1. Large bilateral pulmonary embolism with CT findings suggestive of right heart strain, recommend correlation to echo.   2. 4.8 cm left thyroid lobe nodule.  Dedicated thyroid ultrasound is recommended.              [1]    warfarin  7.5 mg Oral Once at night   [2]    continuous dose heparin 2,300 Units/hr (04/25/25 1045)   [3] No Known Allergies

## 2025-04-25 NOTE — PLAN OF CARE
Pt is A&O x4. Denies pain & cardiac symptoms.  Maintaining O2 on RA. CPAP while sleeping. MARCOS.  R groin site is soft, no hematoma. Dressing is place. C/D/I  NS/ST on tele, S1&S2 present. Bowel sounds active. Continent.  Pt updated on plan of care. Heparin gtt. Heme consult.  Bed in lowest position. Bed alarm on. Call light within reach.     0430: Pt got up to use the bathroom and R groin site started oozing. Pressure held, quick clot applied.    Site is still soft, no hematoma. Mild bruising.     Problem: RESPIRATORY - ADULT  Goal: Achieves optimal ventilation and oxygenation  Description: INTERVENTIONS:- Assess for changes in respiratory status- Assess for changes in mentation and behavior- Position to facilitate oxygenation and minimize respiratory effort- Oxygen supplementation based on oxygen saturation or ABGs- Provide Smoking Cessation handout, if applicable- Encourage broncho-pulmonary hygiene including cough, deep breathe, Incentive Spirometry- Assess the need for suctioning and perform as needed- Assess and instruct to report SOB or any respiratory difficulty- Respiratory Therapy support as indicated- Manage/alleviate anxiety- Monitor for signs/symptoms of CO2 retention  Outcome: Progressing     Problem: Patient/Family Goals  Goal: Patient/Family Long Term Goal  Description: Patient's Long Term Goal: Pt will go home   Interventions:  - MD to see  - heparin gtt  Outcome: Progressing  Goal: Patient/Family Short Term Goal  Description: Patient's Short Term Goal:patient will feel better, reduced shortness of breath   Interventions;   - MD to see  - heparin gtt  - angiogram- targeted lytics  Outcome: Progressing

## 2025-04-25 NOTE — PLAN OF CARE
Received patient at 0730. Alert and oriented x4. Tele rhythm NSR. O2 sat >97% on RA. Lung sounds diminished. Bed is locked and in low position. Call light and personal items within patient reach. No c/o chest pain or shortness of breath. Pt voiding with no issues. Pt ambulating in room SBA.  R groin site with quikclot dressing in place, clean, dry, intact. Site is soft, no hematoma, no tenderness noted.      POC:  Coumadin bridging tonight per heme  Heparin gtt  Monitor R groin site  Home O2 eval ordered    Reviewed plan of care and patient verbalized understanding.     Problem: Patient/Family Goals  Goal: Patient/Family Long Term Goal  Description: Patient's Long Term Goal: Pt will go home   Interventions:  - MD to see  - heparin gtt  Outcome: Progressing  Goal: Patient/Family Short Term Goal  Description: Patient's Short Term Goal:patient will feel better, reduced shortness of breath   Interventions;   - MD to see  - heparin gtt  - angiogram- targeted lytics  Outcome: Progressing     Problem: RESPIRATORY - ADULT  Goal: Achieves optimal ventilation and oxygenation  Description: INTERVENTIONS:- Assess for changes in respiratory status- Assess for changes in mentation and behavior- Position to facilitate oxygenation and minimize respiratory effort- Oxygen supplementation based on oxygen saturation or ABGs- Provide Smoking Cessation handout, if applicable- Encourage broncho-pulmonary hygiene including cough, deep breathe, Incentive Spirometry- Assess the need for suctioning and perform as needed- Assess and instruct to report SOB or any respiratory difficulty- Respiratory Therapy support as indicated- Manage/alleviate anxiety- Monitor for signs/symptoms of CO2 retention  Outcome: Progressing

## 2025-04-25 NOTE — CONSULTS
[facility]  Report of Consultation    Faustino Bell Patient Status:  Inpatient    1970 MRN [id number]   Location [facility] 8NE-A Attending Ildefonso Polanco, DO   Hosp Day # 2 PCP Orlando Goodson MD     Reason for Consultation:  PE    History of Present Illness:  Faustino Bell is a 55yo male with PMH of LALO, history of PE/DVT, morbid obesity who was admitted for saddle embolism. Patient had COVID in 2024.  At that time he was diagnosed with bilateral PE and bilateral DVTs.  He was on Coumadin for 1 year.  Coumadin was stopped in 2025.  He started having progressive shortness of breath on .  CTA chest showed large bilateral PE with findings of right heart strain.  His initial set O2 sats were in the mid 80s.  He had elevated troponin.  Echo showed right heart strain.  He was started on heparin drip.  Yesterday he underwent mechanical thrombectomy.  Feels better today.  Dopplers showed right popliteal DVT.  No fevers, chills, sweats.  Denies any abdominal pain, nausea, vomiting, diarrhea.    History:  Past Medical History[1]  Past Surgical History[2]  Family History[3]   reports that he has never smoked. He has never used smokeless tobacco. He reports that he does not currently use drugs. He reports that he does not drink alcohol.    Allergies:  Allergies[4]    Medications:  Current Hospital Medications[5]    Review of Systems:  A 10-point review of systems was done with pertinent positives and negatives per the HPI.    Physical Exam:   Blood pressure 159/90, pulse 87, temperature 98.3 °F (36.8 °C), temperature source Oral, resp. rate 23, height 6' (1.829 m), weight (!) 450 lb (204.1 kg), SpO2 95%.    Vital Signs: /90 (BP Location: Left arm)   Pulse 87   Temp 98.3 °F (36.8 °C) (Oral)   Resp 23   Ht 6' (1.829 m)   Wt (!) 450 lb (204.1 kg)   SpO2 95%   BMI 61.03 kg/m²       General: lying in bed, somewhat anxious   HEENT: normocephalic  Neck: supple  Chest: Clear to  auscultation.  Heart: Regular rate   Abdomen: Soft, non tender, morbidly obese   Extremities: no BLE edema   Neurological: AOx3  Skin: no rash    Laboratory Data:    Lab Results   Component Value Date    K 4.0 04/25/2025    PTT 41.8 04/25/2025           Impression and Plan:    Saddle PE and right popliteal DVT  - dx 4/23/25  - second VTE  - s/p mechanical thrombectomy 4/24  - given two incidents of VTE, and now very significant event, recommend indefinite anticoagulation  - Given morbid obesity of BMI 61 and 204 kg, he is not a candidate for direct oral anticoagulants in my opinion.  I recommend bridging with heparin drip to warfarin.  - he was on warfarin 7.5mg - 11.25mg previously  - will start warfarin 7.5mg  - INR goal 2.0 - 3.0  - Pharmacy to dose Coumadin  -Continue heparin until INR greater than 2.0  - Discussed bleeding precautions  - Hypercoagulable workup can be done as outpatient  - Follow-up Dr. Goodson on discharge    History of DVT/PE  - Diagnosed March 2024  - Was on anticoagulation for 1 year with Coumadin    Thank you for allowing me to participate in the care of this patient.    Elizabeth Lanier MD  [facility]  Department of Oncology and Hematology  4/25/2025           [1]   Past Medical History:   Arrhythmia    hospitalized 2007 fast heart rate    Hypothyroid    Obstructive apnea    [facility] HST AHI 33    Prediabetes    Unspecified sleep apnea   [2]   Past Surgical History:  Procedure Laterality Date    Hernia surgery     [3]   Family History  Problem Relation Age of Onset    Hypertension Father     Heart Disorder Father         MI final event    Other (renal failure) Father     Cancer Mother     Diabetes Mother     Diabetes Sister     Cancer Sister     Diabetes Maternal Aunt     Diabetes Maternal Uncle    [4] No Known Allergies  [5]   Current Facility-Administered Medications:     heparin (Porcine) 96961 units/250mL infusion PE/DVT/THROMBUS CONTINUOUS, 200-3,000 Units/hr, Intravenous,  Continuous    acetaminophen (Tylenol Extra Strength) tab 500 mg, 500 mg, Oral, Q6H PRN

## 2025-04-25 NOTE — PROGRESS NOTES
Pulmonary Progress Note        NAME: Faustino Bell - ROOM: [facility]86-A - MRN: [id number] - Age: 54 year old - : 1970        Last 24hrs: No events overnight,denies complaints, feels that breathing is better, off O2 at rest    OBJECTIVE:  Vitals:    25 2218 25 2339 25 0538 25 0742   BP:  103/68 123/66 159/90   BP Location:  Radial Left arm Left arm   Pulse: 91 89 94 87   Resp:  18 18 23   Temp:  98 °F (36.7 °C) 98.3 °F (36.8 °C) 98.3 °F (36.8 °C)   TempSrc:  Oral Oral Oral   SpO2: 95% 96% 91% 95%   Weight:       Height:           Oxygen Therapy  SpO2: 95 %  O2 Device: None (Room air)  Mode: AutoPAP  O2 Flow Rate (L/min): 2 L/min  Pulse Oximetry Type: Continuous  Oximetry Probe Site Changed: No  Pulse Ox Probe Location: Left hand                No intake or output data in the 24 hours ending 25 0833      Scheduled Medication:Scheduled Medications[1]  Continuous Infusing Medication:Medication Infusions[2]    Lungs: clear to auscultation bilaterally  Heart: S1, S2 normal, no murmur, click, rub or gallop, regular rate and rhythm  Abdomen: soft, non-tender; bowel sounds normal; no masses,  no organomegaly  Extremities: extremities normal, atraumatic, no cyanosis or edema    Labs reviewed as noted below      ASSESSMENT/PLAN:    Acute pulmonary emboli and DVT - recurrent after stopping Coumadin. CT chest suggestive of right heart strain.  -s/p aspiration thrombectomy  - Echo w/ septal dyssynergy and borderline elevated RVSP  - anticoag per heme  LLAO  - CPAP  Dispo  - check ambulatory desat screen      Fam LunaLawrencesay  [facility]  Pulmonary and Critical Care             [1] [2]    continuous dose heparin 2,100 Units/hr (25 7165)

## 2025-04-25 NOTE — PROGRESS NOTES
[facility] Hospitalist Progress Note     Subjective:  Pt seen and examined this am  No acute events. On room air this morning, no chest pain or dyspnea.   Sp thrombolysis yest    Review of Systems  Comprehensive ROS reviewed and negative except for what is stated in HPI.      OBJECTIVE:  /90 (BP Location: Left arm)   Pulse 87   Temp 98.3 °F (36.8 °C) (Oral)   Resp 23   Ht 6' (1.829 m)   Wt (!) 450 lb (204.1 kg)   SpO2 95%   BMI 61.03 kg/m²   General: No apparent distress.  Alert and oriented.  HEENT:  EOMI, PERRLA.  Pulm: Clear breath sounds bilaterally.  Normal respiratory effort.  CV: Regular rate and rhythm, no murmur.   Abd: Obese abdomen, nontender, nondistended.   MSK: Peripheral edema bilaterally.   Skin: No lesions or rashes.  Neuro: No obvious focal deficits.      Data Review:    LABS:   Lab Results   Component Value Date    K 4.0 04/25/2025    PTT 41.8 04/25/2025       All lab work and imaging personally reviewed.    Assessment/Plan:     Assessment/ Plan:     #Acute hypoxic resp failure  2/2 saddle PE with evidence of right heart strain  #Right popliteal DVT- chronic?  -currently on room air, monitor spo2  -s/p thrombolysis 4/24/25 >>  discussed with patient bedside   -heparin gtt- previously on coumadin  -heme/onc consulted >> apprec recs --  hypercoag workup and see if  DOAC is an option.   -trend labs  -cards following      #LALO  -CPAP nightly     #Hypothyroidism  -resume home levothyroxine once verified     DVT ppx: heparin gtt  Diet: ADAT    DISPO:  Cont inpatient      Outpatient records or previous hospital records reviewed.   [facility] hospitalist to continue to follow patient while in house.         Nicki Vidal Hospitalist  Pager [phone]  Answering Service number: [phone]

## 2025-04-26 LAB
APTT PPP: 65.4 SECONDS (ref 23–36)
INR BLD: 1.08 (ref 0.8–1.2)
PROTHROMBIN TIME: 14.1 SECONDS (ref 11.6–14.8)

## 2025-04-26 PROCEDURE — 85610 PROTHROMBIN TIME: CPT | Performed by: INTERNAL MEDICINE

## 2025-04-26 PROCEDURE — 94760 N-INVAS EAR/PLS OXIMETRY 1: CPT

## 2025-04-26 PROCEDURE — 85730 THROMBOPLASTIN TIME PARTIAL: CPT | Performed by: STUDENT IN AN ORGANIZED HEALTH CARE EDUCATION/TRAINING PROGRAM

## 2025-04-26 RX ORDER — HEPARIN SODIUM 1000 [USP'U]/ML
30 INJECTION, SOLUTION INTRAVENOUS; SUBCUTANEOUS ONCE
Status: COMPLETED | OUTPATIENT
Start: 2025-04-26 | End: 2025-04-26

## 2025-04-26 RX ORDER — WARFARIN SODIUM 5 MG/1
10 TABLET ORAL
Status: COMPLETED | OUTPATIENT
Start: 2025-04-26 | End: 2025-04-26

## 2025-04-26 NOTE — PROGRESS NOTES
[facility] Hospitalist Progress Note     Subjective:  Pt seen and examined this am  No acute events. On room air this morning, no chest pain or dyspnea.   Sp thrombolysis 4/24/25  Got coumadin last night    Review of Systems  Comprehensive ROS reviewed and negative except for what is stated in HPI.      OBJECTIVE:  /67 (BP Location: Radial)   Pulse 82   Temp 98.2 °F (36.8 °C) (Oral)   Resp 20   Ht 6' (1.829 m)   Wt (!) 449 lb 15.3 oz (204.1 kg)   SpO2 97%   BMI 61.03 kg/m²   General: No apparent distress.  Alert and oriented.  HEENT:  EOMI, PERRLA.  Pulm: Clear breath sounds bilaterally.  Normal respiratory effort.  CV: Regular rate and rhythm, no murmur.   Abd: Obese abdomen, nontender, nondistended.   MSK: Peripheral edema bilaterally.   Skin: No lesions or rashes.  Neuro: No obvious focal deficits.      Data Review:    LABS:   Lab Results   Component Value Date    WBC 7.8 04/25/2025    HGB 12.0 04/25/2025    HCT 38.1 04/25/2025    .0 04/25/2025    CREATSERUM 0.89 04/25/2025    BUN 8 04/25/2025     04/25/2025    K 4.3 04/25/2025     04/25/2025    CO2 23.0 04/25/2025     04/25/2025    CA 9.0 04/25/2025    PTT 50.5 04/25/2025    INR 1.08 04/26/2025    PTP 14.1 04/26/2025    MG 2.1 04/25/2025       All lab work and imaging personally reviewed.    Assessment/Plan:     Assessment/ Plan:     #Acute hypoxic resp failure  2/2 saddle PE with evidence of right heart strain  #Right popliteal DVT- chronic?  -currently on room air, monitor spo2  -s/p thrombolysis 4/24/25 >>  discussed with patient bedside   -heparin gtt- previously on coumadin  -heme/onc consulted >> kim Lanier >> plan for resuming coumadin (lifelong) with lovenox bridge  -not a candidate for DOAC considering BMI >> if pt were to lose weight >> doac could be reconsidered  -trend labs  -cards following >> plan to repeat limited TTE prior to dc to re-assess RV function/RVSP     #LALO  -CPAP nightly      #Hypothyroidism  -resume home levothyroxine once verified     DVT ppx: heparin gtt  Diet: ADAT    DISPO:  Cont inpatient   Per heme/onc strongly rec this pt stay in house for bridge given high dose of lovenox needed  Dc planning  Cards and Onc following     Outpatient records or previous hospital records reviewed.   [facility] hospitalist to continue to follow patient while in house.         Nicki Junior MD  [facility] Hospitalist  Pager [phone]  Answering Service number: [phone]

## 2025-04-26 NOTE — CONSULTS
Pharmacy Dosing Service: Warfarin (Coumadin)  Faustino Bell is a 54 year old male for whom pharmacy has been dosing warfarin (Coumadin). Goal INR is 2-3    Recent Labs   Lab 04/23/25  1316 04/25/25  0854 04/26/25  0413   INR 1.08 1.07 1.08     Potential Drug Interactions:  none   Other Anticoagulants:  Heparin drip  Home regimen (if applicable): Warfarin 11.25 mg on Monday and Friday. Warfarin 7.5 mg rest of days of the week.    Date/Time last dose was given: 7.5mg on 4/25    Inpatient Dosing History  Date 4/25 4/26       INR 1.07 1.08       Coumadin dose 7.5          Based on above -  1.  For today, Give warfarin (COUMADIN) 10 mg at 2100 tonight  2   PT/INR ordered daily while on coumadin  3.  Pharmacy will continue to follow.  We appreciate the opportunity to assist in his care.    Jessica Scherer, PharmD  4/26/2025  9:21 AM

## 2025-04-26 NOTE — PROGRESS NOTES
[facility]  Progress Note    Faustino Bell Patient Status:  Inpatient    1970 MRN [id number]   Location [facility] 8NE-A Attending Ildefonso Polanco, DO   Hosp Day # 3 PCP Orlando Goodson MD     Subjective:  Patient seen sitting in chair eating breakfast.    Objective:  Blood pressure 149/86, pulse 98, temperature 98.1 °F (36.7 °C), temperature source Oral, resp. rate 22, height 6' (1.829 m), weight (!) 449 lb 15.3 oz (204.1 kg), SpO2 93%.    General: lying in bed, somewhat anxious   HEENT: normocephalic  Neck: supple  Chest: Clear to auscultation.  Heart: Regular rate   Abdomen: Soft, non tender, morbidly obese   Extremities: no BLE edema   Neurological: AOx3  Skin: no rash    Labs:   Lab Results   Component Value Date    PTT 50.5 2025    INR 1.08 2025    PTP 14.1 2025         Assessment and Plan:  Saddle PE and right popliteal DVT  - dx 25  - second VTE  - s/p mechanical thrombectomy   - given two incidents of VTE, and now very significant event, recommend indefinite anticoagulation  - Given morbid obesity of BMI 61 and 204 kg, he is not a candidate for direct oral anticoagulants in my opinion.  I recommend bridging with heparin drip to warfarin.  - he was on warfarin 7.5mg - 11.25mg previously  - started warfarin 7.5mg on   - INR goal 2.0 - 3.0  - Pharmacy to dose Coumadin  -Continue heparin until INR greater than 2.0  - Discussed bleeding precautions  -I strongly recommend staying in the hospital on heparin drip until INR is therapeutic.  Bridging at home with Lovenox will carry significant risk given that he would require dose of 200 mg every 12 hours.  I discussed this with the patient.  - Hypercoagulable workup can be done as outpatient  - Follow-up Dr. Goodson on discharge     History of DVT/PE  - Diagnosed 2024  - Was on anticoagulation for 1 year with Coumadin     Thank you for allowing me to participate in the care of this patient.  Follow-up Dr. Goodson on  discharge.    Elizabeth Lanier MD  [facility]  Department of Oncology and Hematology

## 2025-04-26 NOTE — PROGRESS NOTES
[facility] Progress Note        Faustino Bell Patient Status:  Inpatient    1970 MRN [id number]   [facility] 8NE-A Attending Ildefonso Polanco, DO   Hosp Day # 3 PCP Orlando Goodson MD          Impression:     1.  Saddle PE, RV strain s/p Inari aspiration thrombectomy with large clot removal (25)    2. recurrent PE (3/24), occurred 6 weeks after coumadin cessation--> restarted coumadin therapy, will be indefinite therapy, hematology following    3. Morbid obesity BMI 61 (450#)    4. LALO    5. Hypothyroidism        Recommend:     1.  PE: stable s/p aspiration thrombectomy.  mild oozing from RFV site, Hg stable- monitor.  Repeat limited TTE prior to dc to re-assess RV function/RVSP (although mPA improved 36-->27mmHg post aspiration).    2. AC: Re-started coumadin 25/ bridge with heparin.  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  DOAC and lovenox unreliably therapeutic based on weight, per hematology. hypercoagulability work-up outpatient.     Subjective:  The patient denies  chest pain and shortness of breath at rest, RA R groin oozing resolved.      Medications:  Scheduled Medications[1]    Continuous Infusions:  Medication Infusions[2]      Allergies:  Allergies[3]      Objective:        Intake/Output:      Intake/Output Summary (Last 24 hours) at 2025 0750  Last data filed at 2025  Gross per 24 hour   Intake 1966.54 ml   Output 2 ml   Net 1964.54 ml     Wt Readings from Last 3 Encounters:   25 (!) 449 lb 15.3 oz (204.1 kg)   24 (!) 432 lb (196 kg)   21 (!) 470 lb (213.2 kg)       Physical Exam:        Vitals:    252 25 0415   BP: 135/86  132/83 108/67   BP Location: Radial  Radial Radial   Pulse: 97  93 82   Resp: 20  20 20   Temp: 98.2 °F (36.8 °C)  98.3 °F (36.8 °C) 98.2 °F (36.8 °C)   TempSrc: Oral  Oral Oral   SpO2:  94%  95% 97%   Weight:  (!) 449 lb 15.3 oz (204.1 kg)     Height:           Temp:  [97.4 °F (36.3 °C)-98.3 °F (36.8 °C)] 98.2 °F (36.8 °C)  Pulse:  [82-97] 82  Resp:  [20-23] 20  BP: (108-135)/(67-86) 108/67  SpO2:  [94 %-97 %] 97 %      Temp: 98.2 °F (36.8 °C)  Pulse: 82  Resp: 20  BP: 108/67  General:  Appears comfortable  HEENT: No focal deficits.  Neck: No JVD, carotids 2+ no bruits.  Cardiac: Regular S1S2.  No S3, S4, rub, click.  No murmur.  Lungs: Clear to auscultation and percussion.  Abdomen: Soft, non-tender.   Extremities: No LE edema.  No clubbing or cyanosis.    Neurologic: Alert and oriented, normal affect.  Skin: Warm and dry.           LABS:      HEM:  Recent Labs   Lab 04/23/25  1316 04/24/25  0429 04/25/25  0854   WBC 8.8 8.9 7.8   HGB 12.6* 11.9* 12.0*   HCT 40.4 37.7* 38.1*   .0 182.0 192.0       Chem:  Recent Labs   Lab 04/23/25  1316 04/24/25  0429 04/25/25  0018 04/25/25  0855    144  --  141   K 4.0 3.6 4.0 4.3    107  --  108   CO2 26.0 28.0  --  23.0   BUN 7* 9  --  8*   CREATSERUM 1.09 0.96  --  0.89   CA 9.1 8.7  --  9.0   MG  --   --   --  2.1   * 135*  --  147*       Recent Labs   Lab 04/23/25  1316 04/24/25  0429   ALT 21 18   AST 24 20   ALB 4.3 4.1       Recent Labs   Lab 04/23/25  1316 04/23/25  2131 04/24/25  1631 04/25/25  0018 04/25/25  0854 04/25/25  1537 04/25/25  2300 04/26/25  0413   PTT 24.9   < > 165.8* 41.8* 45.4* 53.7* 50.5*  --    INR 1.08  --   --   --  1.07  --   --  1.08    < > = values in this interval not displayed.           No results found for: \"TROP\", \"CKMB\"      Invalid input(s): \"PBNPML\"                       Diagnostics:   Telemetry:     EKG, 4/24/2025,         Echo: 4/23/25    Conclusions:     1. Left ventricle: The cavity size was below normal. Wall thickness was      mildly increased. Systolic function was normal. The estimated ejection      fraction was 55-60%, by visual assessment. No diagnostic evidence for      regional wall  motion abnormalities. Doppler parameters are consistent      with abnormal left ventricular relaxation - grade 1 diastolic      dysfunction.   2. Right ventricle: The cavity size was increased. Systolic function was      reduced. The tricuspid annular plane systolic excursion (TAPSE) is      1.30cm.   3. Ventricular septum: Septal motion was dyssynergic.   4. Left atrium: The left atrial volume was normal.   5. Aortic root: The aortic root was 4.0cm diameter. The aortic root was      mildly dilated.   6. Ascending aorta: The ascending aorta was 3.6cm diameter.   7. Pulmonary arteries: Systolic pressure was at the upper limits of normal,      in the range of 30mm Hg to 35mm Hg.   Impressions:  This study is compared with previous dated 3/30/2024:   *                 Impression:    EKG, 4/23/2025:  /min.  Q III, old     CXR, 4/23/2025:       CT chest, 4/23/25  CONCLUSION:    1. Large bilateral pulmonary embolism with CT findings suggestive of right heart strain, recommend correlation to echo.   2. 4.8 cm left thyroid lobe nodule.  Dedicated thyroid ultrasound is recommended.              [1] [2]    continuous dose heparin 2,700 Units/hr (04/26/25 4862)   [3] No Known Allergies

## 2025-04-26 NOTE — PROGRESS NOTES
Faustino Bell Patient Status:  Inpatient    1970 MRN [id number]   Location [facility] 8NE-A Attending Ildefonso Polanco, DO   Hosp Day # 3 PCP Orlando Goodson MD        SUBJECTIVE:feels better, denies cp, sob   OBJECTIVE:  Patient Vitals for the past 24 hrs:   BP Temp Temp src Pulse Resp SpO2 Weight   25 0845 149/86 98.1 °F (36.7 °C) Oral 98 22 93 % --   25 0415 108/67 98.2 °F (36.8 °C) Oral 82 20 97 % --   25 2302 132/83 98.3 °F (36.8 °C) Oral 93 20 95 % --   25 -- -- -- -- -- -- (!) 449 lb 15.3 oz (204.1 kg)   25 1957 135/86 98.2 °F (36.8 °C) Oral 97 20 94 % --   25 1559 131/80 98.3 °F (36.8 °C) Oral 94 23 94 % --   25 1520 -- -- -- 92 -- -- --   25 1201 109/84 97.4 °F (36.3 °C) Oral 88 20 96 % --   25 1100 -- -- -- 90 -- 94 % --     O2 requirement: RA     I/O last 3 completed shifts:  In: 1966.5 [P.O.:1080; I.V.:886.5]  Out: 2 [Urine:2]  I/O this shift:  In: 360 [P.O.:360]  Out: -     Medications:  Reviewed in EMR.     Physical Exam:   General: no distress   Lungs: clear bilaterally   Heart: Regular rate and rhythm, normal S1S2, no murmur.   Extremity: no edema, no cyanosis         Lab Results   Component Value Date    PTT 50.5 2025    INR 1.08 2025    PTP 14.1 2025          Imaging: Reviewed.     ASSESSMENT/PLAN:        Acute pulmonary emboli and DVT - recurrent after stopping Coumadin. CT chest suggestive of right heart strain.  -s/p aspiration thrombectomy  - Echo w/ septal dyssynergy and borderline elevated RVSP  - anticoag per heme, currently heparin drip bridge to coumadin  Acute hypoxic respiratory failure:  secondary to above  - O2 prn, currently RA  - as above  LALO  - CPAP  Dispo  - discharge planning when INR therapeutic, will follow prn    Discussed with patient in detail, all questions answered.     Vince Conley MD  2025  10:55 AM

## 2025-04-26 NOTE — PAYOR COMM NOTE
CONTINUED STAY REVIEW    Payor: [employer]  Subscriber #:  [id number]  Authorization Number: [id number]    Admit date: 4/23/25  Admit time:  7:47 PM    REVIEW DOCUMENTATION:          4/25     HEME/ONC:       Report of Consultation    Reason for Consultation:  PE     History of Present Illness:  Faustino Bell is a 53yo male with PMH of LALO, history of PE/DVT, morbid obesity who was admitted for saddle embolism. Patient had COVID in March 2024.  At that time he was diagnosed with bilateral PE and bilateral DVTs.  He was on Coumadin for 1 year.  Coumadin was stopped in March 2025.  He started having progressive shortness of breath on 4/23.  CTA chest showed large bilateral PE with findings of right heart strain.  His initial set O2 sats were in the mid 80s.  He had elevated troponin.  Echo showed right heart strain.  He was started on heparin drip.  Yesterday he underwent mechanical thrombectomy.  Feels better today.  Dopplers showed right popliteal DVT.  No fevers, chills, sweats.  Denies any abdominal pain, nausea, vomiting, diarrhea.   Physical Exam:   Blood pressure 159/90, pulse 87, temperature 98.3 °F (36.8 °C), temperature source Oral, resp. rate 23,        PULMONARY      OBJECTIVE:  Vitals          Vitals:     04/24/25 2218 04/24/25 2339 04/25/25 0538 04/25/25 0742   BP:   103/68 123/66 159/90   BP Location:   Radial Left arm Left arm   Pulse: 91 89 94 87   Resp:   18 18 23   Temp:   98 °F (36.7 °C) 98.3 °F (36.8 °C) 98.3 °F (36.8 °C)   TempSrc:   Oral Oral Oral   SpO2: 95% 96% 91% 95%   Weight:           Height:                    Oxygen Therapy  SpO2: 95 %  O2 Device: None (Room air)  Mode: AutoPAP  O2 Flow Rate (L/min): 2 L/min  Pulse Oximetry Type: Continuous  Oximetry Probe Site Changed: No  Pulse Ox Probe Location: Left hand                 No intake or output data in the 24 hours ending 04/25/25 0833        Scheduled Medication:[Scheduled Medications]    [Scheduled Medications]  Continuous Infusing  Medication:[Medication Infusions]    [Medication Infusions]   continuous dose heparin 2,100 Units/hr (04/25/25 2688)        Lungs: clear to auscultation bilaterally  Heart: S1, S2 normal, no murmur, click, rub or gallop, regular rate and rhythm  Abdomen: soft, non-tender; bowel sounds normal; no masses,  no organomegaly  Extremities: extremities normal, atraumatic, no cyanosis or edema      ASSESSMENT/PLAN:     Acute pulmonary emboli and DVT - recurrent after stopping Coumadin. CT chest suggestive of right heart strain.  -s/p aspiration thrombectomy  - Echo w/ septal dyssynergy and borderline elevated RVSP  - anticoag per heme  LLAO  - CPAP  Dispo  - check ambulatory desat screen        4/25      HOSPITALIST:       [facility] Hospitalist Progress Note      Subjective:  Pt seen and examined this am  No acute events. On room air this morning, no chest pain or dyspnea.   Sp thrombolysis yest     Review of Systems  Comprehensive ROS reviewed and negative except for what is stated in HPI.       OBJECTIVE:  /90 (BP Location: Left arm)   Pulse 87   Temp 98.3 °F (36.8 °C) (Oral)   Resp 23   Ht 6' (1.829 m)   Wt (!) 450 lb (204.1 kg)   SpO2 95%   BMI 61.03 kg/m²   General: No apparent distress.  Alert and oriented.  HEENT:  EOMI, PERRLA.  Pulm: Clear breath sounds bilaterally.  Normal respiratory effort.  CV: Regular rate and rhythm, no murmur.   Abd: Obese abdomen, nontender, nondistended.   MSK: Peripheral edema bilaterally.   Skin: No lesions or rashes.  Neuro: No obvious focal deficits.        Assessment/Plan:      Assessment/ Plan:      #Acute hypoxic resp failure  2/2 saddle PE with evidence of right heart strain  #Right popliteal DVT- chronic?  -currently on room air, monitor spo2  -s/p thrombolysis 4/24/25 >>  discussed with patient bedside   -heparin gtt- previously on coumadin  -heme/onc consulted >> apprec recs --  hypercoag workup and see if  DOAC is an option.   -trend labs  -cards  following      #LALO  -CPAP nightly     #Hypothyroidism  -resume home levothyroxine once verified     DVT ppx: heparin gtt  Diet: ADAT     DISPO:  Cont inpatient           CARDIOLOGY:         Impression:     1.  Saddle PE, RV strain s/p Inari aspiration thrombectomy with large clot removal (4/24/25)     2. 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 Morbid obesity BMI 61 (450#)     4. LALO     5. Hypothyroidism        Recommend:     1.  PE: stable s/p aspiration thrombectomy.  mild oozing from RFV site, Hg stable- monitor.  Repeat limited TTE prior to dc to re-assess RV function/RVSP (although mPA improved 36-->27mmHg post aspiration).     2. AC: Re-started coumadin today, indefinite therapy considering unprovoked 2nd VTE event, this time saddle/large thrombus burden.  DOAC and lovenox may be unreliably therapeutic based on weight.  If so, likely need to stay until INR therapeutic while on heparin gtt.  Appreciate hematology input regarding this issues.  Also consider hypercoagulability work-up.        Subjective:  The patient denies  chest pain and shortness of breath at rest, RA. mild oozing from R groin.          HEM:        Recent Labs   Lab 04/23/25  1316 04/24/25 0429 04/25/25  0854   WBC 8.8 8.9 7.8   HGB 12.6* 11.9* 12.0*   HCT 40.4 37.7* 38.1*   .0 182.0 192.0         Chem:         Recent Labs   Lab 04/23/25  1316 04/24/25  0429 04/25/25  0018 04/25/25  0855    144  --  141   K 4.0 3.6 4.0 4.3    107  --  108   CO2 26.0 28.0  --  23.0   BUN 7* 9  --  8*   CREATSERUM 1.09 0.96  --  0.89   CA 9.1 8.7  --  9.0   MG  --   --   --  2.1   * 135*  --  147*              Recent Labs   Lab 04/23/25  1316 04/24/25  0429   ALT 21 18   AST 24 20   ALB 4.3 4.1                  Recent Labs   Lab 04/23/25  1316 04/23/25  2131 04/24/25  0430 04/24/25  1631 04/25/25  0018 04/25/25  0854   PTT  24.9 42.4* 53.4* 165.8* 41.8* 45.4*   INR 1.08  --   --   --   --   --            Impression:     EKG, 4/23/2025:  /min.  Q III, old     CXR, 4/23/2025:       CT chest, 4/23/25  CONCLUSION:    1. Large bilateral pulmonary embolism with CT findings suggestive of right heart strain, recommend correlation to echo.   2. 4.8 cm left thyroid lobe nodule.  Dedicated thyroid ultrasound is recommended.                 MEDICATIONS ADMINISTERED IN LAST 1 DAY:  heparin (Porcine) 1000 UNIT/ML injection - BOLUS IV 6,100 Units       Date Action Dose Route User    4/25/2025 0129 Given 6,100 Units Intravenous Eulalia Chu RN          heparin (Porcine) 1000 UNIT/ML injection - BOLUS IV 6,100 Units       Date Action Dose Route User    4/25/2025 1038 Given 6,100 Units Intravenous Jacquelyn Kothari RN          heparin (Porcine) 81124 units/250mL infusion PE/DVT/THROMBUS CONTINUOUS       Date Action Dose Route User    4/25/2025 1734 New Bag 2,500 Units/hr Intravenous Jacquelyn Kothari RN    4/25/2025 1636 Hi-Risk Rate/Dose Change 2,500 Units/hr Intravenous Jacquelyn Kothari RN    4/25/2025 1045 Hi-Risk Rate/Dose Change 2,300 Units/hr Intravenous Jacquelyn Kothari RN    4/25/2025 0557 New Bag 2,100 Units/hr Intravenous Eulalia Chu RN    4/25/2025 0128 Hi-Risk Rate/Dose Change 2,100 Units/hr Intravenous Eulalia Chu RN            Vitals (last day)       Date/Time Temp Pulse Resp BP SpO2 Weight O2 Device O2 Flow Rate (L/min) [employer]    04/25/25 1559 98.3 °F (36.8 °C) 94 23 131/80 94 % -- None (Room air) --     04/25/25 1520 -- 92 -- -- -- -- -- --     04/25/25 1201 97.4 °F (36.3 °C) 88 20 109/84 96 % -- None (Room air) --     04/25/25 1100 -- 90 -- -- 94 % -- -- --     04/25/25 0742 98.3 °F (36.8 °C) 87 23 159/90 95 % -- None (Room air) -- LS    04/25/25 0538 98.3 °F (36.8 °C) 94 18 123/66 91 % -- CPAP -- LO    04/24/25 2339 98 °F (36.7 °C) 89 18 103/68 96 % -- CPAP -- VW    04/24/25 2218 -- 91 -- -- 95 % -- -- -- LP    04/24/25  1936 98.4 °F (36.9 °C) 99 18 132/86 95 % -- CPAP --     04/24/25 1925 -- 100 -- 150/81 94 % -- -- -- AE    04/24/25 1730 -- 90 -- 163/98 96 % -- -- --     04/24/25 1548 98.1 °F (36.7 °C) 88 17 162/91 96 % -- None (Room air) --     04/24/25 1530 -- 91 -- 167/98 -- -- -- --     04/24/25 1339 98.1 °F (36.7 °C) 92 19 156/98 93 % -- None (Room air) --     04/24/25 0830 -- 106 -- -- 89 % -- -- --     04/24/25 0800 98 °F (36.7 °C) 93 20 145/88 91 % -- None (Room air) --     04/24/25 0431 98 °F (36.7 °C) 94 19 122/69 88 % -- CPAP --     04/24/25 0157 -- 97 -- -- 93 % -- -- --     04/24/25 0016 97.9 °F (36.6 °C) 102 18 151/85 92 % -- None (Room air) --

## 2025-04-26 NOTE — PLAN OF CARE
Assumed patient care at  1930.  Resting in bed. Alert and oriented x 4.  Denies chest pain or shortness of breath.  Frequent vital signs complete for post recovery of  Thrombectomy of R/L lung. Right groin , dressing CDI. Heparin gtt infusing per PE/DVT protocol.  Telemetry monitoring in progress, NSR  Continuous pulse oximetry  in progress, 94-95 % room air. Denies cough. Plan of care updated and discussed regarding Groin site care, Vital sign monitoring,  fall precautions and evening medications  Problem: RESPIRATORY - ADULT  Goal: Achieves optimal ventilation and oxygenation  Description: INTERVENTIONS:- Assess for changes in respiratory status- Assess for changes in mentation and behavior- Position to facilitate oxygenation and minimize respiratory effort- Oxygen supplementation based on oxygen saturation or ABGs- Provide Smoking Cessation handout, if applicable- Encourage broncho-pulmonary hygiene including cough, deep breathe, Incentive Spirometry- Assess the need for suctioning and perform as needed- Assess and instruct to report SOB or any respiratory difficulty- Respiratory Therapy support as indicated- Manage/alleviate anxiety- Monitor for signs/symptoms of CO2 retention  Outcome: Progressing     Problem: Patient/Family Goals  Goal: Patient/Family Long Term Goal  Description: Patient's Long Term Goal: Pt will go home   Interventions:  - MD to see  - heparin gtt  Outcome: Progressing  Goal: Patient/Family Short Term Goal  Description: Patient's Short Term Goal:patient will feel better, reduced shortness of breath   Interventions;   - MD to see  - heparin gtt  - angiogram- targeted lytics  Outcome: Progressing     Problem: SAFETY ADULT - FALL  Goal: Free from fall injury  Description: INTERVENTIONS:- Assess pt frequently for physical needs- Identify cognitive and physical deficits and behaviors that affect risk of falls.- [place] fall precautions as indicated by assessment.- Educate pt/family on patient  safety including physical limitations- Instruct pt to call for assistance with activity based on assessment- Modify environment to reduce risk of injury- Provide assistive devices as appropriate- Consider OT/PT consult to assist with strengthening/mobility- Encourage toileting schedule  Outcome: Progressing     Problem: CARDIOVASCULAR - ADULT  Goal: Maintains optimal cardiac output and hemodynamic stability  Description: INTERVENTIONS:- Monitor vital signs, rhythm, and trends- Monitor for bleeding, hypotension and signs of decreased cardiac output- Evaluate effectiveness of vasoactive medications to optimize hemodynamic stability- Monitor arterial and/or venous puncture sites for bleeding and/or hematoma- Assess quality of pulses, skin color and temperature- Assess for signs of decreased coronary artery perfusion - ex. Angina- Evaluate fluid balance, assess for edema, trend weights  Outcome: Progressing  Goal: Absence of cardiac arrhythmias or at baseline  Description: INTERVENTIONS:- Continuous cardiac monitoring, monitor vital signs, obtain 12 lead EKG if indicated- Evaluate effectiveness of antiarrhythmic and heart rate control medications as ordered- Initiate emergency measures for life threatening arrhythmias- Monitor electrolytes and administer replacement therapy as ordered  Outcome: Progressing     Problem: METABOLIC/FLUID AND ELECTROLYTES - ADULT  Goal: Electrolytes maintained within normal limits  Description: INTERVENTIONS:- Monitor labs and rhythm and assess patient for signs and symptoms of electrolyte imbalances- Administer electrolyte replacement as ordered- Monitor response to electrolyte replacements, including rhythm and repeat lab results as appropriate- Fluid restriction as ordered- Instruct patient on fluid and nutrition restrictions as appropriate  Outcome: Progressing     Problem: SKIN/TISSUE INTEGRITY - ADULT  Goal: Skin integrity remains intact  Description: INTERVENTIONS- Assess and  document risk factors for pressure ulcer development- Assess and document skin integrity- Monitor for areas of redness and/or skin breakdown- Initiate interventions, skin care algorithm/standards of care as needed  Outcome: Progressing

## 2025-04-26 NOTE — PLAN OF CARE
Assumed care of patient around 0730.Patient AXOX4.On room air.NSR on tele.No c/o pain.Remains on heparin drip.Continent of bowel and bladder.Right groin dressing with old blood,no hematoma or oozing noted.Call light within reach.Plan of care updated.  Problem: RESPIRATORY - ADULT  Goal: Achieves optimal ventilation and oxygenation  Description: INTERVENTIONS:- Assess for changes in respiratory status- Assess for changes in mentation and behavior- Position to facilitate oxygenation and minimize respiratory effort- Oxygen supplementation based on oxygen saturation or ABGs- Provide Smoking Cessation handout, if applicable- Encourage broncho-pulmonary hygiene including cough, deep breathe, Incentive Spirometry- Assess the need for suctioning and perform as needed- Assess and instruct to report SOB or any respiratory difficulty- Respiratory Therapy support as indicated- Manage/alleviate anxiety- Monitor for signs/symptoms of CO2 retention  Outcome: Progressing     Problem: Patient/Family Goals  Goal: Patient/Family Long Term Goal  Description: Patient's Long Term Goal: Pt will go home   Interventions:  - MD to see  - heparin gtt  Outcome: Progressing  Goal: Patient/Family Short Term Goal  Description: Patient's Short Term Goal:patient will feel better, reduced shortness of breath   Interventions;   - MD to see  - heparin gtt  - angiogram- targeted lytics  Outcome: Progressing     Problem: SAFETY ADULT - FALL  Goal: Free from fall injury  Description: INTERVENTIONS:- Assess pt frequently for physical needs- Identify cognitive and physical deficits and behaviors that affect risk of falls.- [place] fall precautions as indicated by assessment.- Educate pt/family on patient safety including physical limitations- Instruct pt to call for assistance with activity based on assessment- Modify environment to reduce risk of injury- Provide assistive devices as appropriate- Consider OT/PT consult to assist with  strengthening/mobility- Encourage toileting schedule  Outcome: Progressing     Problem: CARDIOVASCULAR - ADULT  Goal: Maintains optimal cardiac output and hemodynamic stability  Description: INTERVENTIONS:- Monitor vital signs, rhythm, and trends- Monitor for bleeding, hypotension and signs of decreased cardiac output- Evaluate effectiveness of vasoactive medications to optimize hemodynamic stability- Monitor arterial and/or venous puncture sites for bleeding and/or hematoma- Assess quality of pulses, skin color and temperature- Assess for signs of decreased coronary artery perfusion - ex. Angina- Evaluate fluid balance, assess for edema, trend weights  Outcome: Progressing  Goal: Absence of cardiac arrhythmias or at baseline  Description: INTERVENTIONS:- Continuous cardiac monitoring, monitor vital signs, obtain 12 lead EKG if indicated- Evaluate effectiveness of antiarrhythmic and heart rate control medications as ordered- Initiate emergency measures for life threatening arrhythmias- Monitor electrolytes and administer replacement therapy as ordered  Outcome: Progressing     Problem: METABOLIC/FLUID AND ELECTROLYTES - ADULT  Goal: Electrolytes maintained within normal limits  Description: INTERVENTIONS:- Monitor labs and rhythm and assess patient for signs and symptoms of electrolyte imbalances- Administer electrolyte replacement as ordered- Monitor response to electrolyte replacements, including rhythm and repeat lab results as appropriate- Fluid restriction as ordered- Instruct patient on fluid and nutrition restrictions as appropriate  Outcome: Progressing     Problem: SKIN/TISSUE INTEGRITY - ADULT  Goal: Skin integrity remains intact  Description: INTERVENTIONS- Assess and document risk factors for pressure ulcer development- Assess and document skin integrity- Monitor for areas of redness and/or skin breakdown- Initiate interventions, skin care algorithm/standards of care as needed  Outcome: Progressing      Problem: RESPIRATORY - ADULT  Goal: Achieves optimal ventilation and oxygenation  Description: INTERVENTIONS:- Assess for changes in respiratory status- Assess for changes in mentation and behavior- Position to facilitate oxygenation and minimize respiratory effort- Oxygen supplementation based on oxygen saturation or ABGs- Provide Smoking Cessation handout, if applicable- Encourage broncho-pulmonary hygiene including cough, deep breathe, Incentive Spirometry- Assess the need for suctioning and perform as needed- Assess and instruct to report SOB or any respiratory difficulty- Respiratory Therapy support as indicated- Manage/alleviate anxiety- Monitor for signs/symptoms of CO2 retention  Outcome: Progressing     Problem: Patient/Family Goals  Goal: Patient/Family Short Term Goal  Description: Patient's Short Term Goal:patient will feel better, reduced shortness of breath   Interventions;   - MD to see  - heparin gtt  - angiogram- targeted lytics  Outcome: Progressing     Problem: SAFETY ADULT - FALL  Goal: Free from fall injury  Description: INTERVENTIONS:- Assess pt frequently for physical needs- Identify cognitive and physical deficits and behaviors that affect risk of falls.- [place] fall precautions as indicated by assessment.- Educate pt/family on patient safety including physical limitations- Instruct pt to call for assistance with activity based on assessment- Modify environment to reduce risk of injury- Provide assistive devices as appropriate- Consider OT/PT consult to assist with strengthening/mobility- Encourage toileting schedule  Outcome: Progressing     Problem: CARDIOVASCULAR - ADULT  Goal: Maintains optimal cardiac output and hemodynamic stability  Description: INTERVENTIONS:- Monitor vital signs, rhythm, and trends- Monitor for bleeding, hypotension and signs of decreased cardiac output- Evaluate effectiveness of vasoactive medications to optimize hemodynamic stability- Monitor arterial and/or  venous puncture sites for bleeding and/or hematoma- Assess quality of pulses, skin color and temperature- Assess for signs of decreased coronary artery perfusion - ex. Angina- Evaluate fluid balance, assess for edema, trend weights  Outcome: Progressing     Problem: CARDIOVASCULAR - ADULT  Goal: Absence of cardiac arrhythmias or at baseline  Description: INTERVENTIONS:- Continuous cardiac monitoring, monitor vital signs, obtain 12 lead EKG if indicated- Evaluate effectiveness of antiarrhythmic and heart rate control medications as ordered- Initiate emergency measures for life threatening arrhythmias- Monitor electrolytes and administer replacement therapy as ordered  Outcome: Progressing     Problem: METABOLIC/FLUID AND ELECTROLYTES - ADULT  Goal: Electrolytes maintained within normal limits  Description: INTERVENTIONS:- Monitor labs and rhythm and assess patient for signs and symptoms of electrolyte imbalances- Administer electrolyte replacement as ordered- Monitor response to electrolyte replacements, including rhythm and repeat lab results as appropriate- Fluid restriction as ordered- Instruct patient on fluid and nutrition restrictions as appropriate  Outcome: Progressing     Problem: SKIN/TISSUE INTEGRITY - ADULT  Goal: Skin integrity remains intact  Description: INTERVENTIONS- Assess and document risk factors for pressure ulcer development- Assess and document skin integrity- Monitor for areas of redness and/or skin breakdown- Initiate interventions, skin care algorithm/standards of care as needed  Outcome: Progressing

## 2025-04-27 LAB
APTT PPP: 71.1 SECONDS (ref 23–36)
INR BLD: 1.03 (ref 0.8–1.2)
PROTHROMBIN TIME: 13.6 SECONDS (ref 11.6–14.8)

## 2025-04-27 PROCEDURE — 85730 THROMBOPLASTIN TIME PARTIAL: CPT | Performed by: INTERNAL MEDICINE

## 2025-04-27 PROCEDURE — 85610 PROTHROMBIN TIME: CPT | Performed by: INTERNAL MEDICINE

## 2025-04-27 PROCEDURE — 94799 UNLISTED PULMONARY SVC/PX: CPT

## 2025-04-27 RX ORDER — WARFARIN SODIUM 5 MG/1
10 TABLET ORAL
Status: COMPLETED | OUTPATIENT
Start: 2025-04-27 | End: 2025-04-27

## 2025-04-27 NOTE — PROGRESS NOTES
[facility] Hospitalist Progress Note     Subjective:  Pt seen and examined this am  No acute events. On room air this morning, no chest pain or dyspnea.   Sp thrombolysis 4/24/25  Started coumadin 2 days ago  INR still around 1    Review of Systems  Comprehensive ROS reviewed and negative except for what is stated in HPI.      OBJECTIVE:  /57 (BP Location: Left arm)   Pulse 92   Temp 98 °F (36.7 °C) (Oral)   Resp 20   Ht 6' (1.829 m)   Wt (!) 434 lb 8.4 oz (197.1 kg)   SpO2 93%   BMI 58.93 kg/m²   General: No apparent distress.  Alert and oriented.  HEENT:  EOMI, PERRLA.  Pulm: Clear breath sounds bilaterally.  Normal respiratory effort.  CV: Regular rate and rhythm, no murmur.   Abd: Obese abdomen, nontender, nondistended.   MSK: Peripheral edema bilaterally.   Skin: No lesions or rashes.  Neuro: No obvious focal deficits.      Data Review:    LABS:   Lab Results   Component Value Date    PTT 71.1 04/27/2025    INR 1.03 04/27/2025    PTP 13.6 04/27/2025       All lab work and imaging personally reviewed.    Assessment/Plan:     Assessment/ Plan:     #Acute hypoxic resp failure  2/2 saddle PE with evidence of right heart strain  #Right popliteal DVT- chronic?  -currently on room air, monitor spo2  -s/p thrombolysis 4/24/25 >>  discussed with patient bedside   -heparin gtt- previously on coumadin  -heme/onc consulted >> kim Lanier >> plan for resuming coumadin (lifelong) with lovenox bridge  -not a candidate for DOAC considering BMI >> if pt were to lose weight >> doac could be reconsidered  -trend labs  -cards following >> plan to repeat limited TTE prior to dc to re-assess RV function/RVSP     #LALO  -CPAP nightly     #Hypothyroidism  -resume home levothyroxine once verified     DVT ppx: heparin gtt  Diet: ADAT    DISPO:  Cont inpatient   Per heme/onc strongly rec this pt stay in house for bridge given high dose of lovenox needed  Dc planning  Cards and Onc following     Outpatient records  or previous hospital records reviewed.   [facility] hospitalist to continue to follow patient while in house.         Nicki Vidal Hospitalist  Pager [phone]  Answering Service number: [phone]

## 2025-04-27 NOTE — PROGRESS NOTES
[facility] Cardiology Progress Note        Faustino Bell Patient Status:  Inpatient    1970 MRN [id number]   [facility] 8NE-A Attending Ildefonso Polanco, DO   Hosp Day # 4 PCP Orlando Goodson MD          Impression:     1.  Saddle PE, RV strain s/p Inari aspiration thrombectomy with large clot removal (25)    2. recurrent PE (3/24), occurred 6 weeks after coumadin cessation--> restarted coumadin therapy, will be indefinite therapy, hematology following    3. Morbid obesity BMI 61 (450#)    4. LALO    5. Hypothyroidism        Recommend:     1.  PE: stable s/p aspiration thrombectomy.   Repeat limited TTE prior to dc to re-assess RV function/RVSP (although mPA improved 36-->27mmHg post aspiration).    2. AC: Re-started coumadin 25/ bridge with heparin.  DOAC unreliably therapeutic based on weight, per hematology. Pt states he went home on lovenox bridge last time (reliable wt based dosing >100kg?, defer to hematology).  Hypercoagulability work-up outpatient. 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      Subjective:  The patient denies  chest pain and shortness of breath at rest. R groin oozing has resolved.    Medications:  Scheduled Medications[1]    Continuous Infusions:  Medication Infusions[2]      Allergies:  Allergies[3]      Objective:        Intake/Output:      Intake/Output Summary (Last 24 hours) at 2025 0638  Last data filed at 2025 1600  Gross per 24 hour   Intake 800 ml   Output --   Net 800 ml     Wt Readings from Last 3 Encounters:   25 (!) 434 lb 8.4 oz (197.1 kg)   24 (!) 432 lb (196 kg)   21 (!) 470 lb (213.2 kg)       Physical Exam:        Vitals:    25 2000 25 0000 25 0400 25 0500   BP: 141/80 147/86 128/77    BP Location: Left arm Left arm Left arm    Pulse: 95 83 79    Resp: 18 18 18    Temp: 98.1 °F (36.7 °C) 98.3 °F (36.8 °C) 98.1 °F  (36.7 °C)    TempSrc: Oral Oral Oral    SpO2: 94% 92% 97%    Weight:    (!) 434 lb 8.4 oz (197.1 kg)   Height:           Temp:  [97.7 °F (36.5 °C)-98.3 °F (36.8 °C)] 98.1 °F (36.7 °C)  Pulse:  [73-98] 79  Resp:  [18-23] 18  BP: (128-164)/() 128/77  SpO2:  [92 %-97 %] 97 %      Temp: 98.1 °F (36.7 °C)  Pulse: 79  Resp: 18  BP: 128/77  General:  Appears comfortable  HEENT: No focal deficits.  Neck: No JVD, carotids 2+ no bruits.  Cardiac: Regular S1S2.  No S3, S4, rub, click.  No murmur.  Lungs: Clear to auscultation and percussion.  Abdomen: Soft, non-tender.   Extremities: No LE edema.  No clubbing or cyanosis.    Neurologic: Alert and oriented, normal affect.  Skin: Warm and dry.           LABS:      HEM:  Recent Labs   Lab 04/23/25  1316 04/24/25  0429 04/25/25  0854   WBC 8.8 8.9 7.8   HGB 12.6* 11.9* 12.0*   HCT 40.4 37.7* 38.1*   .0 182.0 192.0       Chem:  Recent Labs   Lab 04/23/25  1316 04/24/25  0429 04/25/25  0018 04/25/25  0855    144  --  141   K 4.0 3.6 4.0 4.3    107  --  108   CO2 26.0 28.0  --  23.0   BUN 7* 9  --  8*   CREATSERUM 1.09 0.96  --  0.89   CA 9.1 8.7  --  9.0   MG  --   --   --  2.1   * 135*  --  147*       Recent Labs   Lab 04/23/25  1316 04/24/25  0429   ALT 21 18   AST 24 20   ALB 4.3 4.1       Recent Labs   Lab 04/23/25  1316 04/23/25  2131 04/25/25  0854 04/25/25  1537 04/25/25  2300 04/26/25  0413 04/26/25  1314 04/27/25  0512   PTT 24.9   < > 45.4* 53.7* 50.5*  --  65.4* 71.1*   INR 1.08  --  1.07  --   --  1.08  --  1.03    < > = values in this interval not displayed.           No results found for: \"TROP\", \"CKMB\"      Invalid input(s): \"PBNPML\"                       Diagnostics:   Telemetry: SR    EKG, 4/24/2025,         Echo: 4/23/25    Conclusions:     1. Left ventricle: The cavity size was below normal. Wall thickness was      mildly increased. Systolic function was normal. The estimated ejection      fraction was 55-60%, by visual assessment.  No diagnostic evidence for      regional wall motion abnormalities. Doppler parameters are consistent      with abnormal left ventricular relaxation - grade 1 diastolic      dysfunction.   2. Right ventricle: The cavity size was increased. Systolic function was      reduced. The tricuspid annular plane systolic excursion (TAPSE) is      1.30cm.   3. Ventricular septum: Septal motion was dyssynergic.   4. Left atrium: The left atrial volume was normal.   5. Aortic root: The aortic root was 4.0cm diameter. The aortic root was      mildly dilated.   6. Ascending aorta: The ascending aorta was 3.6cm diameter.   7. Pulmonary arteries: Systolic pressure was at the upper limits of normal,      in the range of 30mm Hg to 35mm Hg.   Impressions:  This study is compared with previous dated 3/30/2024:   *                 Impression:    EKG, 4/23/2025:  /min.  Q III, old     CXR, 4/23/2025:       CT chest, 4/23/25  CONCLUSION:    1. Large bilateral pulmonary embolism with CT findings suggestive of right heart strain, recommend correlation to echo.   2. 4.8 cm left thyroid lobe nodule.  Dedicated thyroid ultrasound is recommended.              [1] [2]    continuous dose heparin 2,700 Units/hr (04/27/25 8788)   [3] No Known Allergies

## 2025-04-27 NOTE — PLAN OF CARE
Assumed care of pt at 1930.  A&O x4. Denies pain.  O2 Sat 94% on RA. Breath sounds clear/dim.  NSR on tele.  Right groin dressing with old drainage, dressing changed at 2000. Site is soft, no hematoma, nontender. Right pedal pulse palpable.  Continent of B&B.  Activity level SBA.  Heparin gtt running at 2700 units/hr at this time.  Call light & belongings within reach. Bed in lowest position with alarm on and wheels locked. Updated on POC & verbalized understanding.    POC:  - Heparin gtt, PTT with morning labs  - Coumadin bridging  - Monitor R groin      Problem: RESPIRATORY - ADULT  Goal: Achieves optimal ventilation and oxygenation  Description: INTERVENTIONS:- Assess for changes in respiratory status- Assess for changes in mentation and behavior- Position to facilitate oxygenation and minimize respiratory effort- Oxygen supplementation based on oxygen saturation or ABGs- Provide Smoking Cessation handout, if applicable- Encourage broncho-pulmonary hygiene including cough, deep breathe, Incentive Spirometry- Assess the need for suctioning and perform as needed- Assess and instruct to report SOB or any respiratory difficulty- Respiratory Therapy support as indicated- Manage/alleviate anxiety- Monitor for signs/symptoms of CO2 retention  Outcome: Progressing     Problem: Patient/Family Goals  Goal: Patient/Family Long Term Goal  Description: Patient's Long Term Goal: Pt will go home   Interventions:  - MD to see  - heparin gtt  Outcome: Progressing  Goal: Patient/Family Short Term Goal  Description: Patient's Short Term Goal:patient will feel better, reduced shortness of breath   Interventions;   - MD to see  - heparin gtt  - angiogram- targeted lytics  Outcome: Progressing     Problem: SAFETY ADULT - FALL  Goal: Free from fall injury  Description: INTERVENTIONS:- Assess pt frequently for physical needs- Identify cognitive and physical deficits and behaviors that affect risk of falls.- [place] fall precautions as  indicated by assessment.- Educate pt/family on patient safety including physical limitations- Instruct pt to call for assistance with activity based on assessment- Modify environment to reduce risk of injury- Provide assistive devices as appropriate- Consider OT/PT consult to assist with strengthening/mobility- Encourage toileting schedule  Outcome: Progressing     Problem: CARDIOVASCULAR - ADULT  Goal: Maintains optimal cardiac output and hemodynamic stability  Description: INTERVENTIONS:- Monitor vital signs, rhythm, and trends- Monitor for bleeding, hypotension and signs of decreased cardiac output- Evaluate effectiveness of vasoactive medications to optimize hemodynamic stability- Monitor arterial and/or venous puncture sites for bleeding and/or hematoma- Assess quality of pulses, skin color and temperature- Assess for signs of decreased coronary artery perfusion - ex. Angina- Evaluate fluid balance, assess for edema, trend weights  Outcome: Progressing  Goal: Absence of cardiac arrhythmias or at baseline  Description: INTERVENTIONS:- Continuous cardiac monitoring, monitor vital signs, obtain 12 lead EKG if indicated- Evaluate effectiveness of antiarrhythmic and heart rate control medications as ordered- Initiate emergency measures for life threatening arrhythmias- Monitor electrolytes and administer replacement therapy as ordered  Outcome: Progressing     Problem: METABOLIC/FLUID AND ELECTROLYTES - ADULT  Goal: Electrolytes maintained within normal limits  Description: INTERVENTIONS:- Monitor labs and rhythm and assess patient for signs and symptoms of electrolyte imbalances- Administer electrolyte replacement as ordered- Monitor response to electrolyte replacements, including rhythm and repeat lab results as appropriate- Fluid restriction as ordered- Instruct patient on fluid and nutrition restrictions as appropriate  Outcome: Progressing     Problem: SKIN/TISSUE INTEGRITY - ADULT  Goal: Skin integrity remains  intact  Description: INTERVENTIONS- Assess and document risk factors for pressure ulcer development- Assess and document skin integrity- Monitor for areas of redness and/or skin breakdown- Initiate interventions, skin care algorithm/standards of care as needed  Outcome: Progressing

## 2025-04-27 NOTE — CONSULTS
Pharmacy Dosing Service: Warfarin (Coumadin)  Faustino Bell is a 54 year old male for whom pharmacy has been dosing warfarin (Coumadin). Goal INR is 2-3    Recent Labs   Lab 04/23/25  1316 04/25/25  0854 04/26/25  0413 04/27/25  0512   INR 1.08 1.07 1.08 1.03     Potential Drug Interactions:  none   Other Anticoagulants:  Heparin drip  Home regimen (if applicable): Warfarin 11.25 mg on Monday and Friday. Warfarin 7.5 mg rest of days of the week.    Date/Time last dose was given: 10 mg on 4/26    Inpatient Dosing History    Date 4/25 4/26 4/27         INR 1.07 1.08  1.03         Coumadin dose 7.5 mg 10 mg                Based on above -  1.  For today, Give warfarin (COUMADIN) 10 mg at 2100 tonight  2   PT/INR ordered daily while on coumadin  3.  Pharmacy will continue to follow.  We appreciate the opportunity to assist in his care.    Jessica Scherer, PharmD  4/27/2025  9:24 AM

## 2025-04-27 NOTE — PROGRESS NOTES
[facility]  Progress Note    Faustino Bell Patient Status:  Inpatient    1970 MRN [id number]   Location [facility] 8NE-A Attending Ildefonso Polanco, DO   Hosp Day # 4 PCP Orlando Goodson MD     Subjective:  Patient seen sitting in chair.  He was given warfarin 10 mg last night.  His INR is 1.03 today.  He insist that he has to be discharged by Wednesday in order to keep his job.    Objective:  Blood pressure 146/87, pulse 97, temperature 99.2 °F (37.3 °C), temperature source Oral, resp. rate 18, height 6' (1.829 m), weight (!) 434 lb 8.4 oz (197.1 kg), SpO2 94%.    General: Sitting in chair  HEENT: normocephalic  Neck: supple  Chest: Clear to auscultation.  Heart: Regular rate   Abdomen: Soft, non tender, morbidly obese   Extremities: mild BLE edema   Neurological: AOx3  Skin: no rash    Labs:   Lab Results   Component Value Date    PTT 71.1 2025    INR 1.03 2025    PTP 13.6 2025         Assessment and Plan:  Saddle PE and right popliteal DVT  - dx 25  - second VTE  - s/p mechanical thrombectomy   - given two incidents of VTE, and now very significant event, recommend indefinite anticoagulation  - Given morbid obesity of BMI 61 and 204 kg, he is not a candidate for direct oral anticoagulants in my opinion.  I recommend bridging with heparin drip to warfarin.  - he was on warfarin 7.5mg - 11.25mg previously  - started warfarin 7.5mg on   - INR goal 2.0 - 3.0  - Pharmacy to dose Coumadin  -Continue heparin until INR greater than 2.0  - Discussed bleeding precautions  -I strongly recommend staying in the hospital on heparin drip until INR is therapeutic.  Bridging at home with Lovenox will carry significant risk given that he would require dose of 200 mg every 12 hours.  I discussed this with the patient.  He insists that he has to be discharged by Wednesday in order to keep his job.  Can see where INR is over the next couple days.  He says he bridged with Lovenox  previously.  Again expressed the high dose of Lovenox he would require.  He demonstrates understanding of this risk.  - Hypercoagulable workup can be done as outpatient  - Follow-up Dr. Goodson on discharge     History of DVT/PE  - Diagnosed March 2024  - Was on anticoagulation for 1 year with Coumadin     Thank you for allowing me to participate in the care of this patient.  Follow-up Dr. Goodson on discharge.    Elizabeth Lanier MD  [facility]  Department of Oncology and Hematology

## 2025-04-27 NOTE — PLAN OF CARE
Assumed care of patient around 0730.Patient AXOX4.On room air.NSR on tele.No c/o pain.Remains on heparin drip.PTT therapeutic,next PTT in am.Continent of bowel and bladder.Right groin dressing changed was last night,old drainage noted.Call light within reach.Plan of care updated.  Problem: RESPIRATORY - ADULT  Goal: Achieves optimal ventilation and oxygenation  Description: INTERVENTIONS:- Assess for changes in respiratory status- Assess for changes in mentation and behavior- Position to facilitate oxygenation and minimize respiratory effort- Oxygen supplementation based on oxygen saturation or ABGs- Provide Smoking Cessation handout, if applicable- Encourage broncho-pulmonary hygiene including cough, deep breathe, Incentive Spirometry- Assess the need for suctioning and perform as needed- Assess and instruct to report SOB or any respiratory difficulty- Respiratory Therapy support as indicated- Manage/alleviate anxiety- Monitor for signs/symptoms of CO2 retention  Outcome: Progressing     Problem: Patient/Family Goals  Goal: Patient/Family Long Term Goal  Description: Patient's Long Term Goal: Pt will go home   Interventions:  - MD to see  - heparin gtt  Outcome: Progressing     Problem: Patient/Family Goals  Goal: Patient/Family Short Term Goal  Description: Patient's Short Term Goal:patient will feel better, reduced shortness of breath   Interventions;   - MD to see  - heparin gtt  - angiogram- targeted lytics  Outcome: Progressing     Problem: SAFETY ADULT - FALL  Goal: Free from fall injury  Description: INTERVENTIONS:- Assess pt frequently for physical needs- Identify cognitive and physical deficits and behaviors that affect risk of falls.- [place] fall precautions as indicated by assessment.- Educate pt/family on patient safety including physical limitations- Instruct pt to call for assistance with activity based on assessment- Modify environment to reduce risk of injury- Provide assistive devices as  appropriate- Consider OT/PT consult to assist with strengthening/mobility- Encourage toileting schedule  Outcome: Progressing     Problem: CARDIOVASCULAR - ADULT  Goal: Maintains optimal cardiac output and hemodynamic stability  Description: INTERVENTIONS:- Monitor vital signs, rhythm, and trends- Monitor for bleeding, hypotension and signs of decreased cardiac output- Evaluate effectiveness of vasoactive medications to optimize hemodynamic stability- Monitor arterial and/or venous puncture sites for bleeding and/or hematoma- Assess quality of pulses, skin color and temperature- Assess for signs of decreased coronary artery perfusion - ex. Angina- Evaluate fluid balance, assess for edema, trend weights  Outcome: Progressing     Problem: CARDIOVASCULAR - ADULT  Goal: Absence of cardiac arrhythmias or at baseline  Description: INTERVENTIONS:- Continuous cardiac monitoring, monitor vital signs, obtain 12 lead EKG if indicated- Evaluate effectiveness of antiarrhythmic and heart rate control medications as ordered- Initiate emergency measures for life threatening arrhythmias- Monitor electrolytes and administer replacement therapy as ordered  Outcome: Progressing     Problem: METABOLIC/FLUID AND ELECTROLYTES - ADULT  Goal: Electrolytes maintained within normal limits  Description: INTERVENTIONS:- Monitor labs and rhythm and assess patient for signs and symptoms of electrolyte imbalances- Administer electrolyte replacement as ordered- Monitor response to electrolyte replacements, including rhythm and repeat lab results as appropriate- Fluid restriction as ordered- Instruct patient on fluid and nutrition restrictions as appropriate  Outcome: Progressing     Problem: SKIN/TISSUE INTEGRITY - ADULT  Goal: Skin integrity remains intact  Description: INTERVENTIONS- Assess and document risk factors for pressure ulcer development- Assess and document skin integrity- Monitor for areas of redness and/or skin breakdown- Initiate  interventions, skin care algorithm/standards of care as needed  Outcome: Progressing

## 2025-04-28 ENCOUNTER — APPOINTMENT (OUTPATIENT)
Dept: CV DIAGNOSTICS | Facility: HOSPITAL | Age: 55
End: 2025-04-28
Attending: INTERNAL MEDICINE
Payer: COMMERCIAL

## 2025-04-28 LAB
APTT PPP: 78.8 SECONDS (ref 23–36)
BASOPHILS # BLD AUTO: 0.04 X10(3) UL (ref 0–0.2)
BASOPHILS NFR BLD AUTO: 0.5 %
EOSINOPHIL # BLD AUTO: 0.27 X10(3) UL (ref 0–0.7)
EOSINOPHIL NFR BLD AUTO: 3.5 %
ERYTHROCYTE [DISTWIDTH] IN BLOOD BY AUTOMATED COUNT: 18.4 %
HCT VFR BLD AUTO: 34.4 % (ref 39–53)
HGB BLD-MCNC: 10.9 G/DL (ref 13–17.5)
IMM GRANULOCYTES # BLD AUTO: 0.06 X10(3) UL (ref 0–1)
IMM GRANULOCYTES NFR BLD: 0.8 %
INR BLD: 1.26 (ref 0.8–1.2)
LYMPHOCYTES # BLD AUTO: 1.87 X10(3) UL (ref 1–4)
LYMPHOCYTES NFR BLD AUTO: 24 %
MCH RBC QN AUTO: 24.9 PG (ref 26–34)
MCHC RBC AUTO-ENTMCNC: 31.7 G/DL (ref 31–37)
MCV RBC AUTO: 78.5 FL (ref 80–100)
MONOCYTES # BLD AUTO: 0.55 X10(3) UL (ref 0.1–1)
MONOCYTES NFR BLD AUTO: 7.1 %
NEUTROPHILS # BLD AUTO: 5.01 X10 (3) UL (ref 1.5–7.7)
NEUTROPHILS # BLD AUTO: 5.01 X10(3) UL (ref 1.5–7.7)
NEUTROPHILS NFR BLD AUTO: 64.1 %
PLATELET # BLD AUTO: 185 10(3)UL (ref 150–450)
PROTHROMBIN TIME: 15.9 SECONDS (ref 11.6–14.8)
RBC # BLD AUTO: 4.38 X10(6)UL (ref 4.3–5.7)
WBC # BLD AUTO: 7.8 X10(3) UL (ref 4–11)

## 2025-04-28 PROCEDURE — 93321 DOPPLER ECHO F-UP/LMTD STD: CPT | Performed by: INTERNAL MEDICINE

## 2025-04-28 PROCEDURE — 85730 THROMBOPLASTIN TIME PARTIAL: CPT | Performed by: INTERNAL MEDICINE

## 2025-04-28 PROCEDURE — 85610 PROTHROMBIN TIME: CPT | Performed by: INTERNAL MEDICINE

## 2025-04-28 PROCEDURE — 93325 DOPPLER ECHO COLOR FLOW MAPG: CPT | Performed by: INTERNAL MEDICINE

## 2025-04-28 PROCEDURE — 85025 COMPLETE CBC W/AUTO DIFF WBC: CPT | Performed by: INTERNAL MEDICINE

## 2025-04-28 PROCEDURE — 93308 TTE F-UP OR LMTD: CPT | Performed by: INTERNAL MEDICINE

## 2025-04-28 PROCEDURE — 94799 UNLISTED PULMONARY SVC/PX: CPT

## 2025-04-28 NOTE — PROGRESS NOTES
[facility] Pharmacy Dosing Service  Warfarin (Coumadin) Subsequent Dosing    Faustino Bell is a 54 year old patient for whom pharmacy is dosing warfarin (Coumadin). Goal INR is 2-3    Recent Labs   Lab 04/23/25  1316 04/25/25  0854 04/26/25  0413 04/27/25  0512 04/28/25  0452   INR 1.08 1.07 1.08 1.03 1.26*       Consulted by:  Dr Lanier  Indication:  PE  Potential Drug Interactions:  nothing significant  Other Anticoagulants:  heparin drip  Home regimen (if applicable):  Warfarin 11.25 mg on Monday and Friday. Warfarin 7.5 mg rest of days of the week. (Last known dosing)    Inpatient Dosing History:    Date 4/25 4/26 4/27 4/28     INR 1.07 1.08 1.03 1.26     Coumadin dose 7.5 mg 10 mg 10 mg               Based on above -  1.  For today, Give warfarin (COUMADIN) 12.5 mg at 2100 tonight  2   PT/INR ordered daily while on warfarin  3.  Pharmacy will continue to follow.  We appreciate the opportunity to assist in the care of this patient.    Isamar Britt PharmD  4/28/2025  10:13 AM

## 2025-04-28 NOTE — PROGRESS NOTES
[facility] Hospitalist Progress Note     Subjective:  Pt seen and examined this am  No acute events. On room air this morning, no chest pain or dyspnea.   Sp thrombolysis 4/24/25  Started coumadin 2 days ago  INR 1.26 this am    Review of Systems  Comprehensive ROS reviewed and negative except for what is stated in HPI.      OBJECTIVE:  /87 (BP Location: Left arm)   Pulse 94   Temp 97.4 °F (36.3 °C) (Oral)   Resp 22   Ht 6' (1.829 m)   Wt (!) 432 lb 1.6 oz (196 kg)   SpO2 94%   BMI 58.60 kg/m²   General: No apparent distress.  Alert and oriented.  HEENT:  EOMI, PERRLA.  Pulm: Clear breath sounds bilaterally.  Normal respiratory effort.  CV: Regular rate and rhythm, no murmur.   Abd: Obese abdomen, nontender, nondistended.   MSK: Peripheral edema bilaterally.   Skin: No lesions or rashes.  Neuro: No obvious focal deficits.      Data Review:    LABS:   Lab Results   Component Value Date    WBC 7.8 04/28/2025    HGB 10.9 04/28/2025    HCT 34.4 04/28/2025    .0 04/28/2025    PTT 78.8 04/28/2025    INR 1.26 04/28/2025    PTP 15.9 04/28/2025       All lab work and imaging personally reviewed.    Assessment/Plan:     Assessment/ Plan:     #Acute hypoxic resp failure  2/2 saddle PE with evidence of right heart strain  #Right popliteal DVT- chronic?  -currently on room air, monitor spo2  -s/p thrombolysis 4/24/25 >>  discussed with patient bedside   -heparin gtt- previously on coumadin  -heme/onc consulted >> kim Lanier >> plan for resuming coumadin (lifelong) with lovenox bridge  -not a candidate for DOAC considering BMI >> if pt were to lose weight >> doac could be reconsidered  -trend labs  -cards following >> plan to repeat limited TTE prior to dc to re-assess RV function/RVSP     #LALO  -CPAP nightly     #Hypothyroidism  -resume home levothyroxine once verified     DVT ppx: heparin gtt  Diet: ADAT    DISPO:  Cont inpatient   Per heme/onc strongly rec this pt stay in house for bridge given  high dose of lovenox needed  Dc planning  Cards and Onc following     Outpatient records or previous hospital records reviewed.   [facility] hospitalist to continue to follow patient while in house.         Nicki Junior MD  [facility]ist  Pager [phone]  Answering Service number: [phone]

## 2025-04-28 NOTE — PROGRESS NOTES
[facility] Cardiology Progress Note        Faustino Bell Patient Status:  Inpatient    1970 MRN [id number]   Location [place] 8NE-A Attending Ildefonso Polanco,    Hosp Day # 5 PCP Orlando Goodson MD     Subjective:  The patient denies  chest pain and shortness of breath.    Medications:  Scheduled Medications[1]    Continuous Infusions:  Medication Infusions[2]      Allergies:  Allergies[3]      Objective:        Intake/Output:      Intake/Output Summary (Last 24 hours) at 2025 0802  Last data filed at 2025 0835  Gross per 24 hour   Intake 360 ml   Output --   Net 360 ml     Wt Readings from Last 3 Encounters:   25 (!) 432 lb 1.6 oz (196 kg)   24 (!) 432 lb (196 kg)   21 (!) 470 lb (213.2 kg)       Physical Exam:        Vitals:    25 2314 25 0039 25 0552 25 0553   BP: 121/66  146/87    BP Location: Left arm  Left arm    Pulse: 99 78 88 94   Resp: 20 20 22    Temp: 97.7 °F (36.5 °C)  97.4 °F (36.3 °C)    TempSrc: Oral  Oral    SpO2: 96%  (!) 83% 94%   Weight:    (!) 432 lb 1.6 oz (196 kg)   Height:           Temp:  [97.4 °F (36.3 °C)-99.2 °F (37.3 °C)] 97.4 °F (36.3 °C)  Pulse:  [78-99] 94  Resp:  [18-22] 22  BP: (121-158)/(57-87) 146/87  SpO2:  [83 %-96 %] 94 %      Temp: 97.4 °F (36.3 °C)  Pulse: 94  Resp: 22  BP: 146/87  General:  Appears comfortable  HEENT: No focal deficits.  Neck: No JVD, carotids 2+ no bruits.  Cardiac: Regular S1S2.  No S3, S4, rub, click.  No murmur.  Lungs: Clear to auscultation and percussion.  Abdomen: Soft, non-tender.   Extremities: No LE edema.  No clubbing or cyanosis.    Neurologic: Alert and oriented, normal affect.  Skin: Warm and dry.           LABS:      HEM:  Recent Labs   Lab 25  1316 25  0429 25  0854 25  0452   WBC 8.8 8.9 7.8 7.8   HGB 12.6* 11.9* 12.0* 10.9*   HCT 40.4 37.7* 38.1* 34.4*   .0 182.0 192.0 185.0       Chem:  Recent Labs   Lab 25  1316  04/24/25  0429 04/25/25  0018 04/25/25  0855    144  --  141   K 4.0 3.6 4.0 4.3    107  --  108   CO2 26.0 28.0  --  23.0   BUN 7* 9  --  8*   CREATSERUM 1.09 0.96  --  0.89   CA 9.1 8.7  --  9.0   MG  --   --   --  2.1   * 135*  --  147*       Recent Labs   Lab 04/23/25  1316 04/24/25  0429   ALT 21 18   AST 24 20   ALB 4.3 4.1       Recent Labs   Lab 04/23/25  1316 04/23/25  2131 04/25/25  0854 04/25/25  1537 04/25/25  2300 04/26/25  0413 04/26/25  1314 04/27/25  0512 04/28/25  0452   PTT 24.9   < > 45.4* 53.7* 50.5*  --  65.4* 71.1* 78.8*   INR 1.08  --  1.07  --   --  1.08  --  1.03 1.26*    < > = values in this interval not displayed.           No results found for: \"TROP\", \"CKMB\"      Invalid input(s): \"PBNPML\"                       Diagnostics:   Telemetry: SR    EKG, 4/28/2025,         Echo:      Cardiac Cath:              Impression:    1.  Saddle PE, RV strain s/p Inari aspiration thrombectomy with large clot removal (4/24/25)     2. 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 Morbid obesity BMI 61 (450#)     4. LALO     5. Hypothyroidism        Recommend:     1.  PE: stable s/p aspiration thrombectomy.   Repeat limited TTE prior to dc to re-assess RV function/RVSP (although mPA improved 36-->27mmHg post aspiration).     2. AC: Re-started coumadin 4/25/25/ bridge with heparin.  DOAC unreliably therapeutic based on weight, per hematology. Pt states he went home on lovenox bridge last time , however at his weight lovenox would not be a reliable ac choice per heme.   Hypercoagulability work-up outpatient. 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        Pineda Le MD  4/28/2025  8:02 AM           [1] [2]    continuous dose heparin 2,700 Units/hr (04/27/25 7286)   [3] No Known Allergies

## 2025-04-28 NOTE — PROGRESS NOTES
[facility] HEMATOLOGY ONCOLOGY  Progress Note    Faustino Bell Patient Status:  Inpatient    1970 MRN [id number]   Location [facility] 8NE-A Attending Ildefonso Polanco,    Hosp Day # 5 PCP Orlando Goodson MD     ADMISSION DATE AND TIME: 2025 12:06 PM    ADMIT DIAGNOSIS: Thyroid nodule [E04.1]  Acute saddle pulmonary embolism with acute cor pulmonale (HCC) [I26.02]  Dyspnea, unspecified type [R06.00]    SUBJECTIVE:    Sleeping this am    OBJECTIVE:  Blood pressure 146/87, pulse 94, temperature 97.4 °F (36.3 °C), temperature source Oral, resp. rate 22, height 6' (1.829 m), weight (!) 432 lb 1.6 oz (196 kg), SpO2 94%.    PHYSICAL EXAM:    -    LABS:  Recent Results (from the past 24 hours)   Prothrombin Time (PT)    Collection Time: 25  4:52 AM   Result Value Ref Range    PT 15.9 (H) 11.6 - 14.8 seconds    INR 1.26 (H) 0.80 - 1.20   PTT, Activated    Collection Time: 25  4:52 AM   Result Value Ref Range    PTT 78.8 (H) 23.0 - 36.0 seconds   CBC With Differential With Platelet    Collection Time: 25  4:52 AM   Result Value Ref Range    WBC 7.8 4.0 - 11.0 x10(3) uL    RBC 4.38 4.30 - 5.70 x10(6)uL    HGB 10.9 (L) 13.0 - 17.5 g/dL    HCT 34.4 (L) 39.0 - 53.0 %    .0 150.0 - 450.0 10(3)uL    MCV 78.5 (L) 80.0 - 100.0 fL    MCH 24.9 (L) 26.0 - 34.0 pg    MCHC 31.7 31.0 - 37.0 g/dL    RDW 18.4 %    Neutrophil Absolute Prelim 5.01 1.50 - 7.70 x10 (3) uL    Neutrophil Absolute 5.01 1.50 - 7.70 x10(3) uL    Lymphocyte Absolute 1.87 1.00 - 4.00 x10(3) uL    Monocyte Absolute 0.55 0.10 - 1.00 x10(3) uL    Eosinophil Absolute 0.27 0.00 - 0.70 x10(3) uL    Basophil Absolute 0.04 0.00 - 0.20 x10(3) uL    Immature Granulocyte Absolute 0.06 0.00 - 1.00 x10(3) uL    Neutrophil % 64.1 %    Lymphocyte % 24.0 %    Monocyte % 7.1 %    Eosinophil % 3.5 %    Basophil % 0.5 %    Immature Granulocyte % 0.8 %       CULTURES  No results found for this visit on  04/23/25.    IMAGING:    No results found.      MEDICATIONS:  Medications reviewed.    Scheduled Medications[1]  Medication Infusions[2]  PRN Medications[3]    ASSESSMENT AND PLAN:     Principal Problem:    Acute saddle pulmonary embolism with acute cor pulmonale (HCC)  Active Problems:    Dyspnea, unspecified type    Thyroid nodule        Faustino Bell is a 54 year old male with new PE    Saddle PE and right popliteal DVT  - dx 4/23/25  - second VTE  - s/p mechanical thrombectomy 4/24  - given two incidents of VTE, and now very significant event, recommend indefinite anticoagulation  - Given morbid obesity of BMI 61 and 204 kg, he is not a candidate for direct oral anticoagulants in my opinion.  I recommend bridging with heparin drip to warfarin.  - he was on warfarin 7.5mg - 11.25mg previously  - started warfarin 7.5mg on 4/25  - INR goal 2.0 - 3.0  - Pharmacy to dose Coumadin  -Continue heparin until INR greater than 2.0       History of DVT/PE  - Diagnosed March 2024  - Was on anticoagulation for 1 year with Coumadin        Gasper Gomez MD           [1] [2]    continuous dose heparin 2,700 Units/hr (04/27/25 0777)   [3]   acetaminophen

## 2025-04-28 NOTE — PLAN OF CARE
Assumed care of patient around 1930. A&Ox4. Tolerating room air when awake, compliant with cpap with sleep. Denies sob, lungs clear/diminished bilaterally. NSR on telemetry monitor, heparin gtt infusing and bridging with warfarin-10mg administered this evening per orders. BLE swelling noted. Continent b&b. Denies pain. Ambulating with minimal assist. Pt updated and agrees with plan of care. Frequent rounding and fall precautions in place.     Problem: Patient/Family Goals  Goal: Patient/Family Long Term Goal  Description: Patient's Long Term Goal: Pt will go home   Interventions:  - MD to see  - heparin gtt  Outcome: Progressing  Goal: Patient/Family Short Term Goal  Description: Patient's Short Term Goal:patient will feel better, reduced shortness of breath   Interventions;   - MD to see  - heparin gtt  - angiogram- targeted lytics  Outcome: Progressing

## 2025-04-28 NOTE — PLAN OF CARE
Patient alert and oriented x 4. Up ad mckenzie. On room air.NSR on tele. Continent of bowel and continent of bladder. No complaints of pain, SOB, and/or chest pain / discomfort. POC discussed with patient. Call light within reach, fall precautions in place.       Problem: RESPIRATORY - ADULT  Goal: Achieves optimal ventilation and oxygenation  Description: INTERVENTIONS:- Assess for changes in respiratory status- Assess for changes in mentation and behavior- Position to facilitate oxygenation and minimize respiratory effort- Oxygen supplementation based on oxygen saturation or ABGs- Provide Smoking Cessation handout, if applicable- Encourage broncho-pulmonary hygiene including cough, deep breathe, Incentive Spirometry- Assess the need for suctioning and perform as needed- Assess and instruct to report SOB or any respiratory difficulty- Respiratory Therapy support as indicated- Manage/alleviate anxiety- Monitor for signs/symptoms of CO2 retention  Outcome: Progressing    Problem: Patient/Family Goals  Goal: Patient/Family Long Term Goal  Description: Patient's Long Term Goal: Pt will go home   Interventions:  - MD to see  - heparin gtt  Outcome: Progressing  Goal: Patient/Family Short Term Goal  Description: Patient's Short Term Goal:patient will feel better, reduced shortness of breath   Interventions;   - MD to see  - heparin gtt  - angiogram- targeted lytics  Outcome: Progressing

## 2025-04-29 LAB
APTT PPP: 94.5 SECONDS (ref 23–36)
BASOPHILS # BLD AUTO: 0.04 X10(3) UL (ref 0–0.2)
BASOPHILS NFR BLD AUTO: 0.5 %
EOSINOPHIL # BLD AUTO: 0.26 X10(3) UL (ref 0–0.7)
EOSINOPHIL NFR BLD AUTO: 3.3 %
ERYTHROCYTE [DISTWIDTH] IN BLOOD BY AUTOMATED COUNT: 18.6 %
HCT VFR BLD AUTO: 37.7 % (ref 39–53)
HGB BLD-MCNC: 11.8 G/DL (ref 13–17.5)
IMM GRANULOCYTES # BLD AUTO: 0.07 X10(3) UL (ref 0–1)
IMM GRANULOCYTES NFR BLD: 0.9 %
INR BLD: 1.37 (ref 0.8–1.2)
LYMPHOCYTES # BLD AUTO: 1.95 X10(3) UL (ref 1–4)
LYMPHOCYTES NFR BLD AUTO: 25.1 %
MCH RBC QN AUTO: 25.4 PG (ref 26–34)
MCHC RBC AUTO-ENTMCNC: 31.3 G/DL (ref 31–37)
MCV RBC AUTO: 81.3 FL (ref 80–100)
MONOCYTES # BLD AUTO: 0.46 X10(3) UL (ref 0.1–1)
MONOCYTES NFR BLD AUTO: 5.9 %
NEUTROPHILS # BLD AUTO: 5 X10 (3) UL (ref 1.5–7.7)
NEUTROPHILS # BLD AUTO: 5 X10(3) UL (ref 1.5–7.7)
NEUTROPHILS NFR BLD AUTO: 64.3 %
PLATELET # BLD AUTO: 193 10(3)UL (ref 150–450)
PROTHROMBIN TIME: 17 SECONDS (ref 11.6–14.8)
RBC # BLD AUTO: 4.64 X10(6)UL (ref 4.3–5.7)
WBC # BLD AUTO: 7.8 X10(3) UL (ref 4–11)

## 2025-04-29 PROCEDURE — 85025 COMPLETE CBC W/AUTO DIFF WBC: CPT | Performed by: INTERNAL MEDICINE

## 2025-04-29 PROCEDURE — 94799 UNLISTED PULMONARY SVC/PX: CPT

## 2025-04-29 PROCEDURE — 85730 THROMBOPLASTIN TIME PARTIAL: CPT | Performed by: STUDENT IN AN ORGANIZED HEALTH CARE EDUCATION/TRAINING PROGRAM

## 2025-04-29 PROCEDURE — 85610 PROTHROMBIN TIME: CPT | Performed by: INTERNAL MEDICINE

## 2025-04-29 RX ORDER — WARFARIN SODIUM 7.5 MG/1
15 TABLET ORAL
Status: COMPLETED | OUTPATIENT
Start: 2025-04-29 | End: 2025-04-29

## 2025-04-29 NOTE — PLAN OF CARE
Assumed care of pt at 1930.  Patient alert and oriented x4. Belongings at the bedside.   O2 sats maintained on RA, CPAP at night. Spo2 >90%, tele applied.  Denies shortness of breath, no cough present. Afebrile.   NSR with HR in the 80's on tele at rest. Denies any chest pain, dizziness, or discomfort at this time.   Continent of B&B. Last BM 4/28. Urinal at bedside.    Activity level UAL.   All questions addressed & updated on plan of care. Bed locked and in lowest position. Call light within reach. Bed alarm on.    POC:   - Heparin gtt @27  - coumadin bridging     Problem: RESPIRATORY - ADULT  Goal: Achieves optimal ventilation and oxygenation  Description: INTERVENTIONS:- Assess for changes in respiratory status- Assess for changes in mentation and behavior- Position to facilitate oxygenation and minimize respiratory effort- Oxygen supplementation based on oxygen saturation or ABGs- Provide Smoking Cessation handout, if applicable- Encourage broncho-pulmonary hygiene including cough, deep breathe, Incentive Spirometry- Assess the need for suctioning and perform as needed- Assess and instruct to report SOB or any respiratory difficulty- Respiratory Therapy support as indicated- Manage/alleviate anxiety- Monitor for signs/symptoms of CO2 retention  Outcome: Progressing     Problem: Patient/Family Goals  Goal: Patient/Family Long Term Goal  Description: Patient's Long Term Goal: Pt will go home   Interventions:  - MD to see  - heparin gtt  Outcome: Progressing  Goal: Patient/Family Short Term Goal  Description: Patient's Short Term Goal:patient will feel better, reduced shortness of breath   Interventions;   - MD to see  - heparin gtt  - angiogram- targeted lytics  Outcome: Progressing     Problem: SAFETY ADULT - FALL  Goal: Free from fall injury  Description: INTERVENTIONS:- Assess pt frequently for physical needs- Identify cognitive and physical deficits and behaviors that affect risk of falls.- Ary fall  precautions as indicated by assessment.- Educate pt/family on patient safety including physical limitations- Instruct pt to call for assistance with activity based on assessment- Modify environment to reduce risk of injury- Provide assistive devices as appropriate- Consider OT/PT consult to assist with strengthening/mobility- Encourage toileting schedule  Outcome: Progressing     Problem: CARDIOVASCULAR - ADULT  Goal: Maintains optimal cardiac output and hemodynamic stability  Description: INTERVENTIONS:- Monitor vital signs, rhythm, and trends- Monitor for bleeding, hypotension and signs of decreased cardiac output- Evaluate effectiveness of vasoactive medications to optimize hemodynamic stability- Monitor arterial and/or venous puncture sites for bleeding and/or hematoma- Assess quality of pulses, skin color and temperature- Assess for signs of decreased coronary artery perfusion - ex. Angina- Evaluate fluid balance, assess for edema, trend weights  Outcome: Progressing  Goal: Absence of cardiac arrhythmias or at baseline  Description: INTERVENTIONS:- Continuous cardiac monitoring, monitor vital signs, obtain 12 lead EKG if indicated- Evaluate effectiveness of antiarrhythmic and heart rate control medications as ordered- Initiate emergency measures for life threatening arrhythmias- Monitor electrolytes and administer replacement therapy as ordered  Outcome: Progressing     Problem: METABOLIC/FLUID AND ELECTROLYTES - ADULT  Goal: Electrolytes maintained within normal limits  Description: INTERVENTIONS:- Monitor labs and rhythm and assess patient for signs and symptoms of electrolyte imbalances- Administer electrolyte replacement as ordered- Monitor response to electrolyte replacements, including rhythm and repeat lab results as appropriate- Fluid restriction as ordered- Instruct patient on fluid and nutrition restrictions as appropriate  Outcome: Progressing     Problem: SKIN/TISSUE INTEGRITY - ADULT  Goal: Skin  integrity remains intact  Description: INTERVENTIONS- Assess and document risk factors for pressure ulcer development- Assess and document skin integrity- Monitor for areas of redness and/or skin breakdown- Initiate interventions, skin care algorithm/standards of care as needed  Outcome: Progressing

## 2025-04-29 NOTE — PLAN OF CARE
Patient alert and oriented x 4. Up stand by assist. On RA . NSR on tele. Continent of bowel and  bladder. No complaints of pain, SOB, and/or chest pain / discomfort. POC discussed with patient. Call light within reach, fall precautions in place.       Problem: RESPIRATORY - ADULT  Goal: Achieves optimal ventilation and oxygenation  Description: INTERVENTIONS:- Assess for changes in respiratory status- Assess for changes in mentation and behavior- Position to facilitate oxygenation and minimize respiratory effort- Oxygen supplementation based on oxygen saturation or ABGs- Provide Smoking Cessation handout, if applicable- Encourage broncho-pulmonary hygiene including cough, deep breathe, Incentive Spirometry- Assess the need for suctioning and perform as needed- Assess and instruct to report SOB or any respiratory difficulty- Respiratory Therapy support as indicated- Manage/alleviate anxiety- Monitor for signs/symptoms of CO2 retention  Outcome: Progressing     Problem: Patient/Family Goals  Goal: Patient/Family Long Term Goal  Description: Patient's Long Term Goal: Pt will go home   Interventions:  - MD to see  - heparin gtt  Outcome: Progressing  Goal: Patient/Family Short Term Goal  Description: Patient's Short Term Goal:patient will feel better, reduced shortness of breath   Interventions;   - MD to see  - heparin gtt  - angiogram- targeted lytics  Outcome: Progressing

## 2025-04-29 NOTE — PROGRESS NOTES
[facility] Cardiology Progress Note        Faustino Bell Patient Status:  Inpatient    1970 MRN [id number]   [facility] 8NE-A Attending Ildeofnso Polanco,    Hosp Day # 6 PCP Orlando Goodson MD     Subjective:  The patient denies  chest pain and shortness of breath.    Medications:  Scheduled Medications[1]    Continuous Infusions:  Medication Infusions[2]      Allergies:  Allergies[3]      Objective:        Intake/Output:      Intake/Output Summary (Last 24 hours) at 2025 0922  Last data filed at 2025 2200  Gross per 24 hour   Intake 219 ml   Output 1 ml   Net 218 ml     Wt Readings from Last 3 Encounters:   25 (!) 432 lb 1.6 oz (196 kg)   24 (!) 432 lb (196 kg)   21 (!) 470 lb (213.2 kg)       Physical Exam:        Vitals:    25 1559 25 2054 25 0013 25 0756   BP: 145/90 133/82 111/80 (!) 128/92   BP Location: Left arm Left arm Left arm Left arm   Pulse: 96 97 78 84   Resp: 18 17 16 20   Temp: 97.6 °F (36.4 °C) 98.1 °F (36.7 °C) 98.2 °F (36.8 °C) 97.9 °F (36.6 °C)   TempSrc: Oral Oral Oral Oral   SpO2: 96% 95% 92% 94%   Weight:       Height:           Temp:  [97.6 °F (36.4 °C)-98.2 °F (36.8 °C)] 97.9 °F (36.6 °C)  Pulse:  [78-97] 84  Resp:  [16-24] 20  BP: (111-145)/(80-92) 128/92  SpO2:  [92 %-96 %] 94 %      Temp: 97.9 °F (36.6 °C)  Pulse: 84  Resp: 20  BP: 128/92  General:  Appears comfortable  HEENT: No focal deficits.  Neck: No JVD, carotids 2+ no bruits.  Cardiac: Regular S1S2.  No S3, S4, rub, click.  No murmur.  Lungs: Clear to auscultation and percussion.  Abdomen: Soft, non-tender.   Extremities: No LE edema.  No clubbing or cyanosis.    Neurologic: Alert and oriented, normal affect.  Skin: Warm and dry.           LABS:      HEM:  Recent Labs   Lab 25  1316 25  0429 25  0854 25  0452 25  0538   WBC 8.8 8.9 7.8 7.8 7.8   HGB 12.6* 11.9* 12.0* 10.9* 11.8*   HCT 40.4 37.7* 38.1* 34.4*  37.7*   .0 182.0 192.0 185.0 193.0       Chem:  Recent Labs   Lab 04/23/25  1316 04/24/25  0429 04/25/25  0018 04/25/25  0855    144  --  141   K 4.0 3.6 4.0 4.3    107  --  108   CO2 26.0 28.0  --  23.0   BUN 7* 9  --  8*   CREATSERUM 1.09 0.96  --  0.89   CA 9.1 8.7  --  9.0   MG  --   --   --  2.1   * 135*  --  147*       Recent Labs   Lab 04/23/25  1316 04/24/25  0429   ALT 21 18   AST 24 20   ALB 4.3 4.1       Recent Labs   Lab 04/25/25  0854 04/25/25  1537 04/25/25  2300 04/26/25  0413 04/26/25  1314 04/27/25  0512 04/28/25  0452 04/29/25  0538   PTT 45.4*   < > 50.5*  --  65.4* 71.1* 78.8* 94.5*   INR 1.07  --   --  1.08  --  1.03 1.26* 1.37*    < > = values in this interval not displayed.           No results found for: \"TROP\", \"CKMB\"      Invalid input(s): \"PBNPML\"                       Diagnostics:   Telemetry: SR    EKG, 4/28/2025,         Echo: 4/28/25    Conclusions:     1. Procedure narrative: Transthoracic echocardiography for ventricular      function evaluation. Image quality was suboptimal. The study was      technically limited due to poor acoustic window availability and body      habitus. Intravenous contrast (Definity) was administered to evaluate      left ventricular function and opacify the LV.   2. Left ventricle: The cavity size was normal. Wall thickness was increased.      Systolic function was normal. The estimated ejection fraction was 60-65%,      by visual assessment. Wall motion is normal; there are no regional wall      motion abnormalities. Unable to assess LV diastolic function.   3. Right ventricle: The cavity size was increased. The RV free wall      longitudinal strain was -23.00%.   4. Pulmonary arteries: Systolic pressure was within the normal range,      estimated to be 28mm Hg. The peak systolic pressure is 28mm Hg.     Cardiac Cath:              Impression:    1.  Saddle PE, RV strain s/p Inari aspiration thrombectomy with large clot removal  (4/24/25). Repeat echo, 4/28/25 shows improvement in RVE and dysfunction     2. 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 Morbid obesity BMI 61 (450#)     4. LALO     5. Hypothyroidism        Recommend:     1.  PE: stable s/p aspiration thrombectomy.   Improved RV function per repeat echo     2. AC: Re-started coumadin 4/25/25/ bridging with heparin.  DOAC unreliably therapeutic based on weight, per hematology. Pt states he went home on lovenox bridge last time , however at his weight lovenox would not be a reliable ac choice per heme.   Hypercoagulability work-up outpatient. 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        Pineda Le MD           [1] [2]    continuous dose heparin 2,700 Units/hr (04/29/25 0302)   [3] No Known Allergies

## 2025-04-29 NOTE — PROGRESS NOTES
[facility] Hospitalist Progress Note     Subjective:  No acute overnight events. Sitting in bedside recliner, on room air, no chest pain or dyspnea. Ambulating without difficulty. INR discussed.     Review of Systems  Comprehensive ROS reviewed and negative except for what is stated in HPI.      OBJECTIVE:  BP (!) 128/92 (BP Location: Left arm)   Pulse 116   Temp 97.9 °F (36.6 °C) (Oral)   Resp 20   Ht 6' (1.829 m)   Wt (!) 432 lb 1.6 oz (196 kg)   SpO2 94%   BMI 58.60 kg/m²   General: No apparent distress.  Alert and oriented.  HEENT:  EOMI, PERRLA.  Pulm: Clear breath sounds bilaterally.  Normal respiratory effort.  CV: Regular rate and rhythm, no murmur.   Abd: Obese abdomen, nontender, nondistended.   MSK: Peripheral edema bilaterally.   Skin: No lesions or rashes.  Neuro: No obvious focal deficits.      Data Review:    LABS:   Lab Results   Component Value Date    WBC 7.8 04/29/2025    HGB 11.8 04/29/2025    HCT 37.7 04/29/2025    .0 04/29/2025    PTT 94.5 04/29/2025    INR 1.37 04/29/2025    PTP 17.0 04/29/2025       All lab work and imaging personally reviewed.    Assessment/Plan:     Assessment/ Plan:     #Acute hypoxic resp failure  2/2 saddle PE with evidence of right heart strain  #Right popliteal DVT- chronic?  -currently on room air, monitor spo2  -s/p thrombolysis 4/24/25 >>  discussed with patient bedside   -heparin gtt- previously on coumadin  -heme/onc consulted >> kim Lanier >> plan for resuming coumadin (lifelong) with lovenox bridge  -not a candidate for DOAC considering BMI   -daily INR   -cards following >> plan to repeat limited TTE prior to dc to re-assess RV function/RVSP     #LALO  -CPAP nightly     #Hypothyroidism  -resume home levothyroxine once verified     DVT ppx: heparin gtt  Diet: regular     DISPO:  Cont inpatient   Per heme/onc strongly rec this pt stay in house for bridge given high dose of lovenox needed-- goal INR 2-3 . Likely DC in next 2-3 days   Cards  and Onc following     Outpatient records or previous hospital records reviewed.   [facility] hospitalist to continue to follow patient while in house.    DO Marcy Mcclellan

## 2025-04-29 NOTE — PROGRESS NOTES
[facility] Pharmacy Dosing Service  Warfarin (Coumadin) Subsequent Dosing    Faustino Bell is a 54 year old patient for whom pharmacy is dosing warfarin (Coumadin). Goal INR is 2-3    Recent Labs   Lab 04/25/25  0854 04/26/25  0413 04/27/25  0512 04/28/25  0452 04/29/25  0538   INR 1.07 1.08 1.03 1.26* 1.37*       Consulted by:  Dr Lanier  Indication:  PE  Potential Drug Interactions:  nothing significant  Other Anticoagulants:  heparin gtt for bridging  Home regimen (if applicable):  Warfarin 11.25mg on Mon/Fri and 7.5mg rest of week (last known dosing    Inpatient Dosing History:    Date 4/25 4/26 4/27 4/28 4/29    INR 1.07 1.08 1.03 1.26 1.37    Coumadin dose 7.5mg 10mg 10mg 12.5mg              Based on above -  1.  For today, Give warfarin (COUMADIN) 15 mg at 2100 tonight  2   PT/INR ordered daily while on warfarin  3.  Pharmacy will continue to follow.  We appreciate the opportunity to assist in the care of this patient.    Joyce Bell, PharmD  4/29/2025  11:50 AM

## 2025-04-30 LAB
APTT PPP: 119.1 SECONDS (ref 23–36)
APTT PPP: 77.6 SECONDS (ref 23–36)
BASOPHILS # BLD AUTO: 0.04 X10(3) UL (ref 0–0.2)
BASOPHILS NFR BLD AUTO: 0.6 %
EOSINOPHIL # BLD AUTO: 0.25 X10(3) UL (ref 0–0.7)
EOSINOPHIL NFR BLD AUTO: 3.4 %
ERYTHROCYTE [DISTWIDTH] IN BLOOD BY AUTOMATED COUNT: 18.6 %
HCT VFR BLD AUTO: 38.2 % (ref 39–53)
HGB BLD-MCNC: 11.9 G/DL (ref 13–17.5)
IMM GRANULOCYTES # BLD AUTO: 0.06 X10(3) UL (ref 0–1)
IMM GRANULOCYTES NFR BLD: 0.8 %
INR BLD: 1.59 (ref 0.8–1.2)
LYMPHOCYTES # BLD AUTO: 1.71 X10(3) UL (ref 1–4)
LYMPHOCYTES NFR BLD AUTO: 23.6 %
MCH RBC QN AUTO: 24.8 PG (ref 26–34)
MCHC RBC AUTO-ENTMCNC: 31.2 G/DL (ref 31–37)
MCV RBC AUTO: 79.6 FL (ref 80–100)
MONOCYTES # BLD AUTO: 0.45 X10(3) UL (ref 0.1–1)
MONOCYTES NFR BLD AUTO: 6.2 %
NEUTROPHILS # BLD AUTO: 4.74 X10 (3) UL (ref 1.5–7.7)
NEUTROPHILS # BLD AUTO: 4.74 X10(3) UL (ref 1.5–7.7)
NEUTROPHILS NFR BLD AUTO: 65.4 %
PLATELET # BLD AUTO: 209 10(3)UL (ref 150–450)
PROTHROMBIN TIME: 19.1 SECONDS (ref 11.6–14.8)
RBC # BLD AUTO: 4.8 X10(6)UL (ref 4.3–5.7)
WBC # BLD AUTO: 7.3 X10(3) UL (ref 4–11)

## 2025-04-30 PROCEDURE — 85610 PROTHROMBIN TIME: CPT | Performed by: INTERNAL MEDICINE

## 2025-04-30 PROCEDURE — 85730 THROMBOPLASTIN TIME PARTIAL: CPT | Performed by: STUDENT IN AN ORGANIZED HEALTH CARE EDUCATION/TRAINING PROGRAM

## 2025-04-30 PROCEDURE — 94799 UNLISTED PULMONARY SVC/PX: CPT

## 2025-04-30 PROCEDURE — 85025 COMPLETE CBC W/AUTO DIFF WBC: CPT | Performed by: INTERNAL MEDICINE

## 2025-04-30 NOTE — PROGRESS NOTES
[facility] HEMATOLOGY ONCOLOGY  Progress Note    Faustino Bell Patient Status:  Inpatient    1970 MRN [id number]   Location [facility] 8NE-A Attending Ildefonso Polanco,    Hosp Day # 7 PCP Orlando Goodson MD     ADMISSION DATE AND TIME: 2025 12:06 PM    ADMIT DIAGNOSIS: Thyroid nodule [E04.1]  Acute saddle pulmonary embolism with acute cor pulmonale (HCC) [I26.02]  Dyspnea, unspecified type [R06.00]    SUBJECTIVE:    Awake     OBJECTIVE:  Blood pressure 130/76, pulse 90, temperature 98 °F (36.7 °C), temperature source Oral, resp. rate 23, height 6' (1.829 m), weight (!) 427 lb 4 oz (193.8 kg), SpO2 92%.    PHYSICAL EXAM:    Sitting in bed  No distress           LABS:  Recent Results (from the past 24 hours)   Prothrombin Time (PT)    Collection Time: 25  5:18 AM   Result Value Ref Range    PT 19.1 (H) 11.6 - 14.8 seconds    INR 1.59 (H) 0.80 - 1.20   CBC With Differential With Platelet    Collection Time: 25  5:18 AM   Result Value Ref Range    WBC 7.3 4.0 - 11.0 x10(3) uL    RBC 4.80 4.30 - 5.70 x10(6)uL    HGB 11.9 (L) 13.0 - 17.5 g/dL    HCT 38.2 (L) 39.0 - 53.0 %    .0 150.0 - 450.0 10(3)uL    MCV 79.6 (L) 80.0 - 100.0 fL    MCH 24.8 (L) 26.0 - 34.0 pg    MCHC 31.2 31.0 - 37.0 g/dL    RDW 18.6 %    Neutrophil Absolute Prelim 4.74 1.50 - 7.70 x10 (3) uL    Neutrophil Absolute 4.74 1.50 - 7.70 x10(3) uL    Lymphocyte Absolute 1.71 1.00 - 4.00 x10(3) uL    Monocyte Absolute 0.45 0.10 - 1.00 x10(3) uL    Eosinophil Absolute 0.25 0.00 - 0.70 x10(3) uL    Basophil Absolute 0.04 0.00 - 0.20 x10(3) uL    Immature Granulocyte Absolute 0.06 0.00 - 1.00 x10(3) uL    Neutrophil % 65.4 %    Lymphocyte % 23.6 %    Monocyte % 6.2 %    Eosinophil % 3.4 %    Basophil % 0.6 %    Immature Granulocyte % 0.8 %   PTT, Activated    Collection Time: 25  5:18 AM   Result Value Ref Range    .1 (H) 23.0 - 36.0 seconds       CULTURES  No results found for this visit on  04/23/25.    IMAGING:    No results found.      MEDICATIONS:  Medications reviewed.    Scheduled Medications[1]  Medication Infusions[2]  PRN Medications[3]    ASSESSMENT AND PLAN:     Principal Problem:    Acute saddle pulmonary embolism with acute cor pulmonale (HCC)  Active Problems:    Dyspnea, unspecified type    Thyroid nodule        Faustino Bell is a 54 year old male with new PE    Saddle PE and right popliteal DVT  - dx 4/23/25  - second VTE  - s/p mechanical thrombectomy 4/24  - given two incidents of VTE, and now very significant event, recommend indefinite anticoagulation  - Given morbid obesity of BMI 61 and 204 kg, he is not a candidate for direct oral anticoagulants in my opinion.  It was recommend to bridge with heparin drip to warfarin.  - he was on warfarin 7.5mg - 11.25mg previously  - started warfarin 7.5mg on 4/25  - INR goal 2.0 - 3.0  - Pharmacy to dose Coumadin  INR is 1.59  -Continue heparin until INR greater than 2.0           Gasper Gomez MD           [1] [2]    continuous dose heparin 2,500 Units/hr (04/30/25 0613)   [3]   acetaminophen

## 2025-04-30 NOTE — PROGRESS NOTES
[facility] Cardiology Progress Note        Faustino Bell Patient Status:  Inpatient    1970 MRN [id number]   Location [facility] 8NE-A Attending Ildefonso Polanco,    Hosp Day # 7 PCP Orlando Goodson MD     Subjective:  The patient denies  chest pain and shortness of breath.    Medications:  Scheduled Medications[1]    Continuous Infusions:  Medication Infusions[2]      Allergies:  Allergies[3]      Objective:        Intake/Output:      Intake/Output Summary (Last 24 hours) at 2025 0746  Last data filed at 2025 2043  Gross per 24 hour   Intake 820 ml   Output --   Net 820 ml     Wt Readings from Last 3 Encounters:   25 (!) 427 lb 4 oz (193.8 kg)   24 (!) 432 lb (196 kg)   21 (!) 470 lb (213.2 kg)       Physical Exam:        Vitals:    25 2300 25 0107 25 0458 25 0533   BP: 149/72  112/74    BP Location: Left arm  Left arm    Pulse: 86 77 90 87   Resp: 19  18    Temp: 98.1 °F (36.7 °C)  98 °F (36.7 °C)    TempSrc: Oral  Oral    SpO2: 94%  94%    Weight:    (!) 427 lb 4 oz (193.8 kg)   Height:           Temp:  [97.5 °F (36.4 °C)-98.1 °F (36.7 °C)] 98 °F (36.7 °C)  Pulse:  [] 87  Resp:  [18-23] 18  BP: (110-149)/(65-95) 112/74  SpO2:  [92 %-95 %] 94 %      Temp: 98 °F (36.7 °C)  Pulse: 87  Resp: 18  BP: 112/74  General:  Appears comfortable  HEENT: No focal deficits.  Neck: No JVD, carotids 2+ no bruits.  Cardiac: Regular S1S2.  No S3, S4, rub, click.  No murmur.  Lungs: Clear to auscultation and percussion.  Abdomen: Soft, non-tender.   Extremities: No LE edema.  No clubbing or cyanosis.    Neurologic: Alert and oriented, normal affect.  Skin: Warm and dry.           LABS:      HEM:  Recent Labs   Lab 25  0429 25  0854 25  0452 25  0538 25  0518   WBC 8.9 7.8 7.8 7.8 7.3   HGB 11.9* 12.0* 10.9* 11.8* 11.9*   HCT 37.7* 38.1* 34.4* 37.7* 38.2*   .0 192.0 185.0 193.0 209.0       Chem:  Recent  Labs   Lab 04/23/25  1316 04/24/25  0429 04/25/25  0018 04/25/25  0855    144  --  141   K 4.0 3.6 4.0 4.3    107  --  108   CO2 26.0 28.0  --  23.0   BUN 7* 9  --  8*   CREATSERUM 1.09 0.96  --  0.89   CA 9.1 8.7  --  9.0   MG  --   --   --  2.1   * 135*  --  147*       Recent Labs   Lab 04/23/25  1316 04/24/25  0429   ALT 21 18   AST 24 20   ALB 4.3 4.1       Recent Labs   Lab 04/26/25  0413 04/26/25  1314 04/27/25  0512 04/28/25  0452 04/29/25  0538 04/30/25  0518   PTT  --  65.4* 71.1* 78.8* 94.5* 119.1*   INR 1.08  --  1.03 1.26* 1.37* 1.59*           No results found for: \"TROP\", \"CKMB\"      Invalid input(s): \"PBNPML\"                       Diagnostics:   Telemetry: SR    EKG, 4/28/2025,         Echo: 4/28/25    Conclusions:     1. Procedure narrative: Transthoracic echocardiography for ventricular      function evaluation. Image quality was suboptimal. The study was      technically limited due to poor acoustic window availability and body      habitus. Intravenous contrast (Definity) was administered to evaluate      left ventricular function and opacify the LV.   2. Left ventricle: The cavity size was normal. Wall thickness was increased.      Systolic function was normal. The estimated ejection fraction was 60-65%,      by visual assessment. Wall motion is normal; there are no regional wall      motion abnormalities. Unable to assess LV diastolic function.   3. Right ventricle: The cavity size was increased. The RV free wall      longitudinal strain was -23.00%.   4. Pulmonary arteries: Systolic pressure was within the normal range,      estimated to be 28mm Hg. The peak systolic pressure is 28mm Hg.     Cardiac Cath:              Impression:    1.  Saddle PE, RV strain s/p Inari aspiration thrombectomy with large clot removal (4/24/25). Repeat echo, 4/28/25 shows improvement in RVE and dysfunction     2. recurrent PE (3/24), occurred 6 weeks after coumadin cessation--> restarted coumadin  therapy, will be indefinite therapy, hematology following     3. Morbid obesity BMI 61 (450#)     4. LALO     5. Hypothyroidism        Recommend:     1.  PE: stable s/p aspiration thrombectomy.   Improved RV function per repeat echo     2. AC: Re-started coumadin 4/25/25/ bridging with heparin.  DOAC unreliably therapeutic based on weight, per hematology. Pt states he went home on lovenox bridge last time , however at his weight lovenox would not be a reliable ac choice per heme.   Hypercoagulability work-up outpatient. 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      3. Home once INR > 2.0    Pineda Le MD           [1] [2]    continuous dose heparin 2,500 Units/hr (04/30/25 0613)   [3] No Known Allergies

## 2025-04-30 NOTE — PLAN OF CARE
Assumed care from nocturnal RN. Patient is A&O x4 and was pleasant and cooperative throughout the shift. Patient is able to ambulate independently in the room. Telemetry monitor reads NSR and O2 is stable on room air. BLE are red/scaling, otherwise skin is c/d/i. Education was provided on plan of care, and patient verbalized understanding. Patient safety was maintained. Call light and belongings are in reach.     POC:  - Heparin gtt  - Coumadin bridging      Problem: RESPIRATORY - ADULT  Goal: Achieves optimal ventilation and oxygenation  Description: INTERVENTIONS:- Assess for changes in respiratory status- Assess for changes in mentation and behavior- Position to facilitate oxygenation and minimize respiratory effort- Oxygen supplementation based on oxygen saturation or ABGs- Provide Smoking Cessation handout, if applicable- Encourage broncho-pulmonary hygiene including cough, deep breathe, Incentive Spirometry- Assess the need for suctioning and perform as needed- Assess and instruct to report SOB or any respiratory difficulty- Respiratory Therapy support as indicated- Manage/alleviate anxiety- Monitor for signs/symptoms of CO2 retention  Outcome: Progressing     Problem: Patient/Family Goals  Goal: Patient/Family Long Term Goal  Description: Patient's Long Term Goal: Pt will go home   Interventions:  - heparin gtt  Outcome: Progressing  Goal: Patient/Family Short Term Goal  Description: Patient's Short Term Goal:patient will feel better, reduced shortness of breath   Interventions;  - heparin gtt    Outcome: Progressing     Problem: SAFETY ADULT - FALL  Goal: Free from fall injury  Description: INTERVENTIONS:- Assess pt frequently for physical needs- Identify cognitive and physical deficits and behaviors that affect risk of falls.- [place] fall precautions as indicated by assessment.- Educate pt/family on patient safety including physical limitations- Instruct pt to call for assistance with activity based on  assessment- Modify environment to reduce risk of injury- Provide assistive devices as appropriate- Consider OT/PT consult to assist with strengthening/mobility- Encourage toileting schedule  Outcome: Progressing     Problem: CARDIOVASCULAR - ADULT  Goal: Maintains optimal cardiac output and hemodynamic stability  Description: INTERVENTIONS:- Monitor vital signs, rhythm, and trends- Monitor for bleeding, hypotension and signs of decreased cardiac output- Evaluate effectiveness of vasoactive medications to optimize hemodynamic stability- Monitor arterial and/or venous puncture sites for bleeding and/or hematoma- Assess quality of pulses, skin color and temperature- Assess for signs of decreased coronary artery perfusion - ex. Angina- Evaluate fluid balance, assess for edema, trend weights  Outcome: Progressing  Goal: Absence of cardiac arrhythmias or at baseline  Description: INTERVENTIONS:- Continuous cardiac monitoring, monitor vital signs, obtain 12 lead EKG if indicated- Evaluate effectiveness of antiarrhythmic and heart rate control medications as ordered- Initiate emergency measures for life threatening arrhythmias- Monitor electrolytes and administer replacement therapy as ordered  Outcome: Progressing     Problem: METABOLIC/FLUID AND ELECTROLYTES - ADULT  Goal: Electrolytes maintained within normal limits  Description: INTERVENTIONS:- Monitor labs and rhythm and assess patient for signs and symptoms of electrolyte imbalances- Administer electrolyte replacement as ordered- Monitor response to electrolyte replacements, including rhythm and repeat lab results as appropriate- Fluid restriction as ordered- Instruct patient on fluid and nutrition restrictions as appropriate  Outcome: Progressing     Problem: SKIN/TISSUE INTEGRITY - ADULT  Goal: Skin integrity remains intact  Description: INTERVENTIONS- Assess and document risk factors for pressure ulcer development- Assess and document skin integrity- Monitor for  areas of redness and/or skin breakdown- Initiate interventions, skin care algorithm/standards of care as needed  Outcome: Progressing

## 2025-04-30 NOTE — DIETARY NOTE
[facility]   part of [facility]   CLINICAL NUTRITION    Faustino Bell     Admitting diagnosis:  Thyroid nodule [E04.1]  Acute saddle pulmonary embolism with acute cor pulmonale (HCC) [I26.02]  Dyspnea, unspecified type [R06.00]    Ht: 182.9 cm (6')  Wt: (!) 193.8 kg (427 lb 4 oz).   Body mass index is 57.95 kg/m².  IBW: 80.9 kg    Wt Readings from Last 6 Encounters:   04/30/25 (!) 193.8 kg (427 lb 4 oz)   03/29/24 (!) 196 kg (432 lb)   11/17/21 (!) 213.2 kg (470 lb)   05/17/21 (!) 213.2 kg (470 lb)   05/04/21 (!) 213.2 kg (470 lb)   04/14/18 (!) 170.1 kg (375 lb)        Labs/Meds reviewed    Diet:       Procedures    Regular/General diet Is Patient on Accuchecks? No     Percent Meals Eaten (last 3 days)       Date/Time Percent Meals Eaten (%)    04/27/25 0835 100 %    04/27/25 2202 100 %    04/29/25 0930 100 %    04/29/25 1300 100 %              Pt chart reviewed d/t LOS.  Nursing notes reports Percent Meals Eaten (%): 100 % intake for last meal.  Tolerating po diet without diarrhea, emesis, or constipation.   No significant weight changes noted.     Patient is at low nutrition risk at this time.    Please consult if patient status changes or nutrition issues arise.    Mireya Castro RD, LDN  Clinical Nutrition  [id number]

## 2025-04-30 NOTE — PLAN OF CARE
Assumed care of pt at 1930.  Patient alert and oriented x4. Belongings at the bedside.   O2 sats maintained on RA, CPAP at night. Spo2 >90%, tele applied.  Denies shortness of breath, no cough present. Afebrile.   NSR with HR in the 80's-90's on tele at rest. Denies any chest pain, dizziness, or discomfort at this time.   Continent of B&B. Last BM 4/29. Urinal at bedside.    Activity level UAL.   All questions addressed & updated on plan of care. Bed locked and in lowest position. Call light within reach. Bed alarm on.     POC:   - Heparin gtt @27  - Coumadin bridging  - Heme to see    Problem: RESPIRATORY - ADULT  Goal: Achieves optimal ventilation and oxygenation  Description: INTERVENTIONS:- Assess for changes in respiratory status- Assess for changes in mentation and behavior- Position to facilitate oxygenation and minimize respiratory effort- Oxygen supplementation based on oxygen saturation or ABGs- Provide Smoking Cessation handout, if applicable- Encourage broncho-pulmonary hygiene including cough, deep breathe, Incentive Spirometry- Assess the need for suctioning and perform as needed- Assess and instruct to report SOB or any respiratory difficulty- Respiratory Therapy support as indicated- Manage/alleviate anxiety- Monitor for signs/symptoms of CO2 retention  4/30/2025 0120 by Lolita Chavira RN  Outcome: Progressing  4/30/2025 0120 by Lolita Chavira RN  Outcome: Progressing     Problem: Patient/Family Goals  Goal: Patient/Family Long Term Goal  Description: Patient's Long Term Goal: Pt will go home   Interventions:  - Heme to see  - heparin gtt  4/30/2025 0120 by Lolita Chavira RN  Outcome: Progressing  4/30/2025 0120 by Lolita Chavira RN  Outcome: Progressing  Goal: Patient/Family Short Term Goal  Description: Patient's Short Term Goal:patient will feel better, reduced shortness of breath   Interventions;   - heparin gtt  4/30/2025 0120 by Lolita Chavira RN  Outcome: Progressing  4/30/2025 0120 by Lolita Chavira  RN  Outcome: Progressing     Problem: SAFETY ADULT - FALL  Goal: Free from fall injury  Description: INTERVENTIONS:- Assess pt frequently for physical needs- Identify cognitive and physical deficits and behaviors that affect risk of falls.- [place] fall precautions as indicated by assessment.- Educate pt/family on patient safety including physical limitations- Instruct pt to call for assistance with activity based on assessment- Modify environment to reduce risk of injury- Provide assistive devices as appropriate- Consider OT/PT consult to assist with strengthening/mobility- Encourage toileting schedule  4/30/2025 0120 by Lolita Chavira RN  Outcome: Progressing  4/30/2025 0120 by Lolita Chavira RN  Outcome: Progressing     Problem: CARDIOVASCULAR - ADULT  Goal: Maintains optimal cardiac output and hemodynamic stability  Description: INTERVENTIONS:- Monitor vital signs, rhythm, and trends- Monitor for bleeding, hypotension and signs of decreased cardiac output- Evaluate effectiveness of vasoactive medications to optimize hemodynamic stability- Monitor arterial and/or venous puncture sites for bleeding and/or hematoma- Assess quality of pulses, skin color and temperature- Assess for signs of decreased coronary artery perfusion - ex. Angina- Evaluate fluid balance, assess for edema, trend weights  4/30/2025 0120 by Lolita Chavira RN  Outcome: Progressing  4/30/2025 0120 by Lolita Chavira RN  Outcome: Progressing  Goal: Absence of cardiac arrhythmias or at baseline  Description: INTERVENTIONS:- Continuous cardiac monitoring, monitor vital signs, obtain 12 lead EKG if indicated- Evaluate effectiveness of antiarrhythmic and heart rate control medications as ordered- Initiate emergency measures for life threatening arrhythmias- Monitor electrolytes and administer replacement therapy as ordered  4/30/2025 0120 by Lolita Chavira RN  Outcome: Progressing  4/30/2025 0120 by Lolita Chavira RN  Outcome: Progressing     Problem:  METABOLIC/FLUID AND ELECTROLYTES - ADULT  Goal: Electrolytes maintained within normal limits  Description: INTERVENTIONS:- Monitor labs and rhythm and assess patient for signs and symptoms of electrolyte imbalances- Administer electrolyte replacement as ordered- Monitor response to electrolyte replacements, including rhythm and repeat lab results as appropriate- Fluid restriction as ordered- Instruct patient on fluid and nutrition restrictions as appropriate  4/30/2025 0120 by Lolita Chavira RN  Outcome: Progressing  4/30/2025 0120 by Lolita Chavira RN  Outcome: Progressing     Problem: SKIN/TISSUE INTEGRITY - ADULT  Goal: Skin integrity remains intact  Description: INTERVENTIONS- Assess and document risk factors for pressure ulcer development- Assess and document skin integrity- Monitor for areas of redness and/or skin breakdown- Initiate interventions, skin care algorithm/standards of care as needed  4/30/2025 0120 by Lolita Chavira RN  Outcome: Progressing  4/30/2025 0120 by Lolita Chavira RN  Outcome: Progressing

## 2025-04-30 NOTE — CONSULTS
[facility] Pharmacy Dosing Service  Warfarin (Coumadin) Subsequent Dosing    Faustino Bell is a 54 year old patient for whom pharmacy is dosing warfarin (Coumadin). Goal INR is 2-3    Recent Labs   Lab 04/26/25  0413 04/27/25  0512 04/28/25  0452 04/29/25  0538 04/30/25  0518   INR 1.08 1.03 1.26* 1.37* 1.59*       Consulted by: Dr. Lanier  Indication: PE  Potential Drug Interactions: none  Other Anticoagulants:  heparin infusion  Home regimen (if applicable): 11.25mg on Mon/Fri and 7.5mg rest of week     npatient Dosing History:     Date 4/25 4/26 4/27 4/28 4/29 4/30   INR 1.07 1.08 1.03 1.26 1.37  1.59   Coumadin dose 7.5mg 10mg 10mg 12.5mg  15 mg  12.5 mg            Based on above -  1.  For today, Give warfarin (COUMADIN) 12.5 mg at 2100 tonight  2   PT/INR ordered daily while on warfarin  3.  Pharmacy will continue to follow.  We appreciate the opportunity to assist in the care of this patient.    Vince Silverman, PharmD  4/30/2025  10:06 AM

## 2025-04-30 NOTE — PAYOR COMM NOTE
CONTINUED STAY REVIEW    Payor: GOPAL GARCIAO  Subscriber #:  [id number]  Authorization Number: [id number]    Admit date: 4/23/25  Admit time:  7:47 PM    REVIEW DOCUMENTATION:      4/26      CARDIOLOGY:     Impression:     1.  Saddle PE, RV strain s/p Inari aspiration thrombectomy with large clot removal (4/24/25)     2. 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 Morbid obesity BMI 61 (450#)     4. LALO     5. Hypothyroidism        Recommend:     1.  PE: stable s/p aspiration thrombectomy.  mild oozing from RFV site, Hg stable- monitor.  Repeat limited TTE prior to dc to re-assess RV function/RVSP (although mPA improved 36-->27mmHg post aspiration).     2. AC: Re-started coumadin 4/25/25/ bridge with heparin.  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  DOAC and lovenox unreliably therapeutic based on weight, per hematology. hypercoagulability work-up outpatient.      Subjective:  The patient denies  chest pain and shortness of breath at rest, RA R groin oozing resolved.    Physical Exam:            Vitals:     04/25/25 1957 04/25/25 2012 04/25/25 2302 04/26/25 0415   BP: 135/86   132/83 108/67   BP Location: Radial   Radial Radial   Pulse: 97   93 82   Resp: 20   20 20   Temp: 98.2 °F (36.8 °C)   98.3 °F (36.8 °C) 98.2 °F (36.8 °C)   TempSrc: Oral   Oral Oral   SpO2: 94%   95% 97%   Weight:   (!) 449 lb 15.3 oz (204.1 kg)       Height:                  Temp: 98.2 °F (36.8 °C)  Pulse: 82  Resp: 20  BP: 108/67  General:  Appears comfortable  HEENT: No focal deficits.  Neck: No JVD, carotids 2+ no bruits.  Cardiac: Regular S1S2.  No S3, S4, rub, click.  No murmur.  Lungs: Clear to auscultation and percussion.  Abdomen: Soft, non-tender.   Extremities: No LE edema.  No clubbing or cyanosis.    Neurologic: Alert and oriented, normal affect.  Skin: Warm and  dry.                  Recent Labs   Lab 04/23/25  1316 04/24/25  0429 04/25/25  0854   WBC 8.8 8.9 7.8   HGB 12.6* 11.9* 12.0*   HCT 40.4 37.7* 38.1*   .0 182.0 192.0         Chem:         Recent Labs   Lab 04/23/25  1316 04/24/25  0429 04/25/25  0018 04/25/25  0855    144  --  141   K 4.0 3.6 4.0 4.3    107  --  108   CO2 26.0 28.0  --  23.0   BUN 7* 9  --  8*   CREATSERUM 1.09 0.96  --  0.89   CA 9.1 8.7  --  9.0   MG  --   --   --  2.1   * 135*  --  147*              Recent Labs   Lab 04/23/25  1316 04/24/25  0429   ALT 21 18   AST 24 20   ALB 4.3 4.1          Impression:     EKG, 4/23/2025:  /min.  Q III, old     CXR, 4/23/2025:       CT chest, 4/23/25  CONCLUSION:    1. Large bilateral pulmonary embolism with CT findings suggestive of right heart strain, recommend correlation to echo.   2. 4.8 cm left thyroid lobe nodule.  Dedicated thyroid ultrasound is recommended.            HOSPITALIST:     On license of [facility] Hospitalist Progress Note      Subjective:  Pt seen and examined this am  No acute events. On room air this morning, no chest pain or dyspnea.   Sp thrombolysis 4/24/25  Got coumadin last night     Assessment/ Plan:      #Acute hypoxic resp failure  2/2 saddle PE with evidence of right heart strain  #Right popliteal DVT- chronic?  -currently on room air, monitor spo2  -s/p thrombolysis 4/24/25 >>  discussed with patient bedside   -heparin gtt- previously on coumadin  -heme/onc consulted >> kim Lanier >> plan for resuming coumadin (lifelong) with lovenox bridge  -not a candidate for DOAC considering BMI >> if pt were to lose weight >> doac could be reconsidered  -trend labs  -cards following >> plan to repeat limited TTE prior to dc to re-assess RV function/RVSP     #LALO  -CPAP nightly     #Hypothyroidism  -resume home levothyroxine once verified     DVT ppx: heparin gtt  Diet: ADAT     DISPO:  Cont inpatient   Per heme/onc strongly rec this pt stay in house for  bridge given high dose of lovenox needed  Dc planning  Cards and Onc following          HEME/ONC:         Objective:  Blood pressure 149/86, pulse 98, temperature 98.1 °F (36.7 °C), temperature source Oral, resp. rate 22        Assessment and Plan:  Saddle PE and right popliteal DVT  - dx 4/23/25  - second VTE  - s/p mechanical thrombectomy 4/24  - given two incidents of VTE, and now very significant event, recommend indefinite anticoagulation  - Given morbid obesity of BMI 61 and 204 kg, he is not a candidate for direct oral anticoagulants in my opinion.  I recommend bridging with heparin drip to warfarin.  - he was on warfarin 7.5mg - 11.25mg previously  - started warfarin 7.5mg on 4/25  - INR goal 2.0 - 3.0  - Pharmacy to dose Coumadin  -Continue heparin until INR greater than 2.0  - Discussed bleeding precautions  -I strongly recommend staying in the hospital on heparin drip until INR is therapeutic.  Bridging at home with Lovenox will carry significant risk given that he would require dose of 200 mg every 12 hours.  I discussed this with the patient.  - Hypercoagulable workup can be done as outpatient  - Follow-up Dr. Goodson on discharge     History of DVT/PE  - Diagnosed March 2024  - Was on anticoagulation for 1 year with Coumadin        PULMONARY:       ASSESSMENT/PLAN:        Acute pulmonary emboli and DVT - recurrent after stopping Coumadin. CT chest suggestive of right heart strain.  -s/p aspiration thrombectomy  - Echo w/ septal dyssynergy and borderline elevated RVSP  - anticoag per heme, currently heparin drip bridge to coumadin  Acute hypoxic respiratory failure:  secondary to above  - O2 prn, currently RA  - as above  LALO  - CPAP  Dispo  - discharge planning when INR therapeutic, will follow prn          4/27    CARDIOLOGY:       Impression:     1.  Saddle PE, RV strain s/p Inari aspiration thrombectomy with large clot removal (4/24/25)     2. recurrent PE (3/24), occurred 6 weeks after coumadin  cessation--> restarted coumadin therapy, will be indefinite therapy, hematology following     3. Morbid obesity BMI 61 (450#)     4. LALO     5. Hypothyroidism        Recommend:     1.  PE: stable s/p aspiration thrombectomy.   Repeat limited TTE prior to dc to re-assess RV function/RVSP (although mPA improved 36-->27mmHg post aspiration).     2. AC: Re-started coumadin 4/25/25/ bridge with heparin.  DOAC unreliably therapeutic based on weight, per hematology. Pt states he went home on lovenox bridge last time (reliable wt based dosing >100kg?, defer to hematology).  Hypercoagulability work-up outpatient. 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        Subjective:  The patient denies  chest pain and shortness of breath at rest. R groin oozing has resolved.             Impression:     EKG, 4/23/2025:  /min.  Q III, old     CXR, 4/23/2025:       CT chest, 4/23/25  CONCLUSION:    1. Large bilateral pulmonary embolism with CT findings suggestive of right heart strain, recommend correlation to echo.   2. 4.8 cm left thyroid lobe nodule.  Dedicated thyroid ultrasound is recommended.           4/27    HOSPITALIST:       Subjective:  Pt seen and examined this am  No acute events. On room air this morning, no chest pain or dyspnea.   Sp thrombolysis 4/24/25  Started coumadin 2 days ago  INR still around 1       Assessment/ Plan:      #Acute hypoxic resp failure  2/2 saddle PE with evidence of right heart strain  #Right popliteal DVT- chronic?  -currently on room air, monitor spo2  -s/p thrombolysis 4/24/25 >>  discussed with patient bedside   -heparin gtt- previously on coumadin  -heme/onc consulted >> kim Lanier >> plan for resuming coumadin (lifelong) with lovenox bridge  -not a candidate for DOAC considering BMI >> if pt were to lose weight >> doac could be reconsidered  -trend labs  -cards following >> plan to repeat limited TTE prior to dc to re-assess RV  function/RVSP     #LALO  -CPAP nightly     #Hypothyroidism  -resume home levothyroxine once verified     DVT ppx: heparin gtt  Diet: ADAT     DISPO:  Cont inpatient   Per heme/onc strongly rec this pt stay in house for bridge given high dose of lovenox needed  Dc planning  Cards and Onc following          HEME/ONC:       Subjective:  Patient seen sitting in chair.  He was given warfarin 10 mg last night.  His INR is 1.03 today.  He insist that he has to be discharged by Wednesday in order to keep his job.     Objective:  Blood pressure 146/87, pulse 97, temperature 99.2 °F (37.3 °C), temperature source Oral, resp. rate 18      General: Sitting in chair  HEENT: normocephalic  Neck: supple  Chest: Clear to auscultation.  Heart: Regular rate   Abdomen: Soft, non tender, morbidly obese   Extremities: mild BLE edema        Assessment and Plan:  Saddle PE and right popliteal DVT  - dx 4/23/25  - second VTE  - s/p mechanical thrombectomy 4/24  - given two incidents of VTE, and now very significant event, recommend indefinite anticoagulation  - Given morbid obesity of BMI 61 and 204 kg, he is not a candidate for direct oral anticoagulants in my opinion.  I recommend bridging with heparin drip to warfarin.  - he was on warfarin 7.5mg - 11.25mg previously  - started warfarin 7.5mg on 4/25  - INR goal 2.0 - 3.0  - Pharmacy to dose Coumadin  -Continue heparin until INR greater than 2.0  - Discussed bleeding precautions  -I strongly recommend staying in the hospital on heparin drip until INR is therapeutic.  Bridging at home with Lovenox will carry significant risk given that he would require dose of 200 mg every 12 hours.  I discussed this with the patient.  He insists that he has to be discharged by Wednesday in order to keep his job.  Can see where INR is over the next couple days.  He says he bridged with Lovenox previously.  Again expressed the high dose of Lovenox he would require.  He demonstrates understanding of this  risk.  - Hypercoagulable workup can be done as outpatient  - Follow-up Dr. Goodson on discharge     History of DVT/PE  - Diagnosed March 2024  - Was on anticoagulation for 1 year with Coumadin          4/28          HEME/ONC:     ADMIT DIAGNOSIS: Thyroid nodule [E04.1]  Acute saddle pulmonary embolism with acute cor pulmonale (HCC) [I26.02]  Dyspnea, unspecified type [R06.00]         ASSESSMENT AND PLAN:      Principal Problem:    Acute saddle pulmonary embolism with acute cor pulmonale (HCC)  Active Problems:    Dyspnea, unspecified type    Thyroid nodule           Faustino Bell is a 54 year old male with new PE     Saddle PE and right popliteal DVT  - dx 4/23/25  - second VTE  - s/p mechanical thrombectomy 4/24  - given two incidents of VTE, and now very significant event, recommend indefinite anticoagulation  - Given morbid obesity of BMI 61 and 204 kg, he is not a candidate for direct oral anticoagulants in my opinion.  I recommend bridging with heparin drip to warfarin.  - he was on warfarin 7.5mg - 11.25mg previously  - started warfarin 7.5mg on 4/25  - INR goal 2.0 - 3.0  - Pharmacy to dose Coumadin  -Continue heparin until INR greater than 2.0          CARDIOLOGY:                          Temp:  [97.4 °F (36.3 °C)-99.2 °F (37.3 °C)] 97.4 °F (36.3 °C)  Pulse:  [78-99] 94  Resp:  [18-22] 22  BP: (121-158)/(57-87) 146/87  SpO2:  [83 %-96 %] 94 %          Lab 04/23/25  1316 04/24/25  0429 04/25/25  0854 04/28/25  0452   WBC 8.8 8.9 7.8 7.8   HGB 12.6* 11.9* 12.0* 10.9*   HCT 40.4 37.7* 38.1* 34.4*   .0 182.0 192.0 185.0                    Recent Labs   Lab 04/23/25  1316 04/23/25  2131 04/25/25  0854 04/25/25  1537 04/25/25  2300 04/26/25  0413 04/26/25  1314 04/27/25  0512 04/28/25  0452   PTT 24.9   < > 45.4* 53.7* 50.5*  --  65.4* 71.1* 78.8*   INR 1.08  --  1.07  --   --  1.08  --  1.03 1.26*    < > = values in this interval not displayed.         HOSPITALIST:       Subjective:   Sp thrombolysis  4/24/25  Started coumadin 2 days ago  INR 1.26 this am      Assessment/ Plan:      #Acute hypoxic resp failure  2/2 saddle PE with evidence of right heart strain  #Right popliteal DVT- chronic?  -currently on room air, monitor spo2  -s/p thrombolysis 4/24/25 >>  discussed with patient bedside   -heparin gtt- previously on coumadin  -heme/onc consulted >> kim Lanier >> plan for resuming coumadin (lifelong) with lovenox bridge  -not a candidate for DOAC considering BMI >> if pt were to lose weight >> doac could be reconsidered  -trend labs  -cards following >> plan to repeat limited TTE prior to dc to re-assess RV function/RVSP     #LALO  -CPAP nightly     #Hypothyroidism  -resume home levothyroxine once verified     DVT ppx: heparin gtt  Diet: ADAT     DISPO:  Cont inpatient   Per heme/onc strongly rec this pt stay in house for bridge given high dose of lovenox needed  Dc planning            4/29      HEME/ONC:       Subjective:  No acute overnight events. Sitting in bedside recliner, on room air, no chest pain or dyspnea. Ambulating without difficulty. INR discussed.     Assessment/ Plan:      #Acute hypoxic resp failure  2/2 saddle PE with evidence of right heart strain  #Right popliteal DVT- chronic?  -currently on room air, monitor spo2  -s/p thrombolysis 4/24/25 >>  discussed with patient bedside   -heparin gtt- previously on coumadin  -heme/onc consulted >> kim Lanier >> plan for resuming coumadin (lifelong) with lovenox bridge  -not a candidate for DOAC considering BMI   -daily INR   -cards following >> plan to repeat limited TTE prior to dc to re-assess RV function/RVSP     #LALO  -CPAP nightly     #Hypothyroidism  -resume home levothyroxine once verified     DVT ppx: heparin gtt  Diet: regular      DISPO:  Cont inpatient   Per heme/onc strongly rec this pt stay in house for bridge given high dose of lovenox needed-- goal INR 2-3 . Likely DC in next 2-3 days   Cards and Onc  following              CARDIOLOGY:             Impression:     1.  Saddle PE, RV strain s/p Inari aspiration thrombectomy with large clot removal (4/24/25). Repeat echo, 4/28/25 shows improvement in RVE and dysfunction     2. 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 Morbid obesity BMI 61 (450#)     4. LALO     5. Hypothyroidism        Recommend:     1.  PE: stable s/p aspiration thrombectomy.   Improved RV function per repeat echo     2. AC: Re-started coumadin 4/25/25/ bridging with heparin.  DOAC unreliably therapeutic based on weight, per hematology. Pt states he went home on lovenox bridge last time , however at his weight lovenox would not be a reliable ac choice per heme.   Hypercoagulability work-up outpatient. 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              4/30    HEME/ONC:          ASSESSMENT AND PLAN:      Principal Problem:    Acute saddle pulmonary embolism with acute cor pulmonale (HCC)  Active Problems:    Dyspnea, unspecified type    Thyroid nodule           Faustino Bell is a 54 year old male with new PE     Saddle PE and right popliteal DVT  - dx 4/23/25  - second VTE  - s/p mechanical thrombectomy 4/24  - given two incidents of VTE, and now very significant event, recommend indefinite anticoagulation  - Given morbid obesity of BMI 61 and 204 kg, he is not a candidate for direct oral anticoagulants in my opinion.  It was recommend to bridge with heparin drip to warfarin.  - he was on warfarin 7.5mg - 11.25mg previously  - started warfarin 7.5mg on 4/25  - INR goal 2.0 - 3.0  - Pharmacy to dose Coumadin  INR is 1.59  -Continue heparin until INR greater than 2.0       HOSPITALIST:       OBJECTIVE:  /76 (BP Location: Left arm)   Pulse 90   Temp 98 °F (36.7 °C) (Oral)   Resp 23     Assessment/ Plan:      #Acute hypoxic resp  failure  2/2 saddle PE with evidence of right heart strain  #Right popliteal DVT- chronic?  -currently on room air, monitor spo2  -s/p thrombolysis 4/24/25 >>  discussed with patient bedside   -heparin gtt- previously on coumadin  -heme/onc consulted >> dw Dr Lanier >> plan for resuming coumadin (lifelong) with lovenox bridge  -not a candidate for DOAC considering BMI   -daily INR  -cards following >> plan to repeat limited TTE prior to dc to re-assess RV function/RVSP     #LALO  -CPAP nightly     #Hypothyroidism  -resume home levothyroxine once verified     DVT ppx: heparin gtt  Diet: regular      DISPO:  Cont inpatient   INR 1.59 today   Per heme/onc strongly rec this pt stay in house for bridge given high dose of lovenox needed-- goal INR 2-3 . Likely DC in next 1-2 days   Cards and Onc following            MEDICATIONS ADMINISTERED IN LAST 1 DAY:  heparin (Porcine) 42445 units/250mL infusion PE/DVT/THROMBUS CONTINUOUS       Date Action Dose Route User    4/30/2025 0613 Hi-Risk Rate/Dose Change 2,500 Units/hr Intravenous Lolita Chavira RN    4/30/2025 0452 New Bag 2,700 Units/hr Intravenous Lolita Chavira RN    4/29/2025 2043 New Bag 2,700 Units/hr Intravenous Lolita Chavira RN    4/29/2025 1339 New Bag 2,700 Units/hr Intravenous Sushma Couch RN          warfarin (Coumadin) tab 15 mg       Date Action Dose Route User    4/29/2025 2043 Given 15 mg Oral Lolita Chavira RN            Vitals (last day)       Date/Time Temp Pulse Resp BP SpO2 Weight O2 Device O2 Flow Rate (L/min) Who    04/30/25 0830 -- 90 23 130/76 92 % -- None (Room air) -- LS    04/30/25 0533 -- 87 -- -- -- 427 lb 4 oz (193.8 kg) -- -- JJ    04/30/25 0458 98 °F (36.7 °C) 90 18 112/74 94 % -- None (Room air) -- TR    04/30/25 0107 -- 77 -- -- -- -- -- -- LP    04/29/25 2300 98.1 °F (36.7 °C) 86 19 149/72 94 % -- -- -- J    04/29/25 2114 -- 90 -- -- -- -- -- --     04/29/25 1946 97.5 °F (36.4 °C) 89 18 128/75 95 % -- -- --     04/29/25 1829 -- 96 --  -- -- -- -- -- LS    04/29/25 1606 97.8 °F (36.6 °C) 78 23 110/65 93 % -- CPAP -- LS    04/29/25 1317 -- 102 -- -- -- -- -- -- LS    04/29/25 1220 97.8 °F (36.6 °C) 90 20 146/95 92 % -- None (Room air) -- LS    04/29/25 0845 -- 116 -- -- -- -- -- -- LS    04/29/25 0756 97.9 °F (36.6 °C) 84 20 128/92 94 % -- None (Room air) -- LS    04/29/25 0013 98.2 °F (36.8 °C) 78 16 111/80 92 % -- -- -- PM            04/28/25 2054 98.1 °F (36.7 °C) 97 17 133/82 95 % -- None (Room air) -- TR   04/28/25 1559 97.6 °F (36.4 °C) 96 18 145/90 96 % -- -- -- GA   04/28/25 1219 98.1 °F (36.7 °C) 95 24 136/88 94 % -- -- -- GA   04/28/25 0904 -- -- -- -- 96 % -- -- -- JV   04/28/25 0903 -- -- -- -- 94 % -- None (Room air) -- JV   04/28/25 0900 97.9 °F (36.6 °C) 91 20 120/69 -- -- None (Room air) 0 L/min GA   04/28/25 0553 -- 94 -- -- 94 % 432 lb 1.6 oz (196 kg) Abnormal  CPAP -- BR   04/28/25 0552 97.4 °F (36.3 °C) 88 22 146/87 83 % Abnormal  -- -- -- BR       04/27/25 2356 -- -- -- -- -- -- CPAP -- RH   04/27/25 2314 -- 99 -- 121/66 96 % -- -- -- RH   04/27/25 2314 97.7 °F (36.5 °C) -- 20 -- -- -- None (Room air) 0 L/min BR   04/27/25 1926 98.1 °F (36.7 °C) 95 22 140/74 96 % -- None (Room air) 0 L/min BR   04/27/25 1556 98 °F (36.7 °C) 86 20 158/78 90 % -- -- -- GA   04/27/25 1149 99.2 °F (37.3 °C) 97 18 146/87 94 % -- -- -- GA         04/27/25 0500 -- -- -- -- -- 434 lb 8.4 oz (197.1 kg) Abnormal  -- -- NS   04/27/25 0400 98.1 °F (36.7 °C) 79 18 128/77 97 % -- CPAP -- AF   04/27/25 0000 98.3 °F (36.8 °C) 83 18 147/86 92 % -- None (Room air) 0 L/min AF   04/26/25 2000 98.1 °F (36.7 °C) 95 18 141/80 94 % -- None (Room air) 0 L/min AF   04/26/25 1711 97.7 °F (36.5 °C) 98 23 140/95 Abnormal  97 % -- None (Room air) -- LS   04/26/25 1300 97.8 °F (36.6 °C) 92 18 164/101 Abnormal  95 % -- None (Room air) -- LS      Latest Reference Range & Units 04/28/25 04:52 04/29/25 05:38 04/30/25 05:18   PT 11.6 - 14.8 seconds 15.9 (H) 17.0 (H) 19.1 (H)    INR 0.80 - 1.20  1.26 (H) 1.37 (H) 1.59 (H)   APTT 23.0 - 36.0 seconds 78.8 (H) 94.5 (H) 119.1 (H)   WBC 4.0 - 11.0 x10(3) uL 7.8 7.8 7.3   Hemoglobin 13.0 - 17.5 g/dL 10.9 (L) 11.8 (L) 11.9 (L)   Hematocrit 39.0 - 53.0 % 34.4 (L) 37.7 (L) 38.2 (L)   Platelet Count 150.0 - 450.0 10(3)uL 185.0 193.0 209.0   RBC 4.30 - 5.70 x10(6)uL 4.38 4.64 4.80   MCH 26.0 - 34.0 pg 24.9 (L) 25.4 (L) 24.8 (L)   MCHC 31.0 - 37.0 g/dL 31.7 31.3 31.2   MCV 80.0 - 100.0 fL 78.5 (L) 81.3 79.6 (L)   (H): Data is abnormally high  (L): Data is abnormally low

## 2025-04-30 NOTE — PROGRESS NOTES
[facility] Hospitalist Progress Note     Subjective:  No acute overnight events.  Denies acute chest pain.     Review of Systems  Comprehensive ROS reviewed and negative except for what is stated in HPI.      OBJECTIVE:  /76 (BP Location: Left arm)   Pulse 90   Temp 98 °F (36.7 °C) (Oral)   Resp 23   Ht 6' (1.829 m)   Wt (!) 427 lb 4 oz (193.8 kg)   SpO2 92%   BMI 57.95 kg/m²   General: No apparent distress.  Alert and oriented.  HEENT:  EOMI, PERRLA.  Pulm: Clear breath sounds bilaterally.  Normal respiratory effort.  CV: Regular rate and rhythm, no murmur.   Abd: Obese abdomen, nontender, nondistended.   MSK: Peripheral edema bilaterally.   Skin: No lesions or rashes.  Neuro: No obvious focal deficits.      Data Review:    LABS:   Lab Results   Component Value Date    WBC 7.3 04/30/2025    HGB 11.9 04/30/2025    HCT 38.2 04/30/2025    .0 04/30/2025    .1 04/30/2025    INR 1.59 04/30/2025    PTP 19.1 04/30/2025       All lab work and imaging personally reviewed.    Assessment/Plan:     Assessment/ Plan:     #Acute hypoxic resp failure  2/2 saddle PE with evidence of right heart strain  #Right popliteal DVT- chronic?  -currently on room air, monitor spo2  -s/p thrombolysis 4/24/25 >>  discussed with patient bedside   -heparin gtt- previously on coumadin  -heme/onc consulted >> kim Lanier >> plan for resuming coumadin (lifelong) with lovenox bridge  -not a candidate for DOAC considering BMI   -daily INR  -cards following >> plan to repeat limited TTE prior to dc to re-assess RV function/RVSP     #LALO  -CPAP nightly     #Hypothyroidism  -resume home levothyroxine once verified     DVT ppx: heparin gtt  Diet: regular     DISPO:  Cont inpatient   INR 1.59 today   Per heme/onc strongly rec this pt stay in house for bridge given high dose of lovenox needed-- goal INR 2-3 . Likely DC in next 1-2 days   Cards and Onc following     Outpatient records or previous hospital records  reviewed.   [facility] hospitalist to continue to follow patient while in house.    DO [facility]

## 2025-05-01 LAB
APTT PPP: 102.7 SECONDS (ref 23–36)
BASOPHILS # BLD AUTO: 0.03 X10(3) UL (ref 0–0.2)
BASOPHILS NFR BLD AUTO: 0.4 %
EOSINOPHIL # BLD AUTO: 0.22 X10(3) UL (ref 0–0.7)
EOSINOPHIL NFR BLD AUTO: 2.6 %
ERYTHROCYTE [DISTWIDTH] IN BLOOD BY AUTOMATED COUNT: 18.6 %
HCT VFR BLD AUTO: 39.3 % (ref 39–53)
HGB BLD-MCNC: 12.2 G/DL (ref 13–17.5)
IMM GRANULOCYTES # BLD AUTO: 0.08 X10(3) UL (ref 0–1)
IMM GRANULOCYTES NFR BLD: 1 %
INR BLD: 1.77 (ref 0.8–1.2)
LYMPHOCYTES # BLD AUTO: 2.2 X10(3) UL (ref 1–4)
LYMPHOCYTES NFR BLD AUTO: 26.2 %
MCH RBC QN AUTO: 24.6 PG (ref 26–34)
MCHC RBC AUTO-ENTMCNC: 31 G/DL (ref 31–37)
MCV RBC AUTO: 79.2 FL (ref 80–100)
MONOCYTES # BLD AUTO: 0.55 X10(3) UL (ref 0.1–1)
MONOCYTES NFR BLD AUTO: 6.5 %
NEUTROPHILS # BLD AUTO: 5.32 X10 (3) UL (ref 1.5–7.7)
NEUTROPHILS # BLD AUTO: 5.32 X10(3) UL (ref 1.5–7.7)
NEUTROPHILS NFR BLD AUTO: 63.3 %
PLATELET # BLD AUTO: 232 10(3)UL (ref 150–450)
PROTHROMBIN TIME: 20.8 SECONDS (ref 11.6–14.8)
RBC # BLD AUTO: 4.96 X10(6)UL (ref 4.3–5.7)
WBC # BLD AUTO: 8.4 X10(3) UL (ref 4–11)

## 2025-05-01 PROCEDURE — 85025 COMPLETE CBC W/AUTO DIFF WBC: CPT | Performed by: INTERNAL MEDICINE

## 2025-05-01 PROCEDURE — 85730 THROMBOPLASTIN TIME PARTIAL: CPT | Performed by: STUDENT IN AN ORGANIZED HEALTH CARE EDUCATION/TRAINING PROGRAM

## 2025-05-01 PROCEDURE — 94003 VENT MGMT INPAT SUBQ DAY: CPT

## 2025-05-01 PROCEDURE — 85610 PROTHROMBIN TIME: CPT | Performed by: INTERNAL MEDICINE

## 2025-05-01 NOTE — PROGRESS NOTES
[facility] Dosing Service  Warfarin (Coumadin) Subsequent Dosing    Faustino Bell is a 54 year old patient for whom pharmacy is dosing warfarin (Coumadin). Goal INR is 2-3    Recent Labs   Lab 04/27/25  0512 04/28/25  0452 04/29/25  0538 04/30/25  0518 05/01/25  0457   INR 1.03 1.26* 1.37* 1.59* 1.77*       Consulted by: Dr. Lanier  Indication: PE  Potential Drug Interactions: none  Other Anticoagulants:  heparin gtt bridge  Home regimen (if applicable): 11.25mg on Mon/Fri and 7.5mg rest of week     Inpatient Dosing History:    Date 4/25 4/26 4/27 4/28 4/29 4/30 5/1   INR 1.07 1.08 1.03 1.26 1.37  1.59 1.77   Coumadin dose 7.5mg 10mg 10mg 12.5mg  15 mg  12.5 mg          Based on above -  1.  For today, Give warfarin (COUMADIN) 12.5 mg at 2100 tonight  2   PT/INR ordered daily while on warfarin  3.  Pharmacy will continue to follow.  We appreciate the opportunity to assist in the care of this patient.    Jennifer Lyons, PharmD  5/1/2025  10:33 AM

## 2025-05-01 NOTE — PLAN OF CARE
Assumed care from nocturnal RN. Patient is A&O x4 and was pleasant and cooperative throughout the shift. Patient is able to ambulate independently in the room. Telemetry monitor reads NSR and O2 is stable on room air. BLE are red/scaling, otherwise skin is c/d/i. Education was provided on plan of care, and patient verbalized understanding. Patient safety was maintained. Call light and belongings are in reach.      POC:  - Heparin gtt  - Coumadin bridging      Problem: RESPIRATORY - ADULT  Goal: Achieves optimal ventilation and oxygenation  Description: INTERVENTIONS:- Assess for changes in respiratory status- Assess for changes in mentation and behavior- Position to facilitate oxygenation and minimize respiratory effort- Oxygen supplementation based on oxygen saturation or ABGs- Provide Smoking Cessation handout, if applicable- Encourage broncho-pulmonary hygiene including cough, deep breathe, Incentive Spirometry- Assess the need for suctioning and perform as needed- Assess and instruct to report SOB or any respiratory difficulty- Respiratory Therapy support as indicated- Manage/alleviate anxiety- Monitor for signs/symptoms of CO2 retention  Outcome: Progressing     Problem: Patient/Family Goals  Goal: Patient/Family Long Term Goal  Description: Patient's Long Term Goal: Pt will go home   Interventions:  - heparin gtt  Outcome: Progressing  Goal: Patient/Family Short Term Goal  Description: Patient's Short Term Goal:patient will feel better, reduced shortness of breath   Interventions;  - heparin gtt    Outcome: Progressing     Problem: SAFETY ADULT - FALL  Goal: Free from fall injury  Description: INTERVENTIONS:- Assess pt frequently for physical needs- Identify cognitive and physical deficits and behaviors that affect risk of falls.- [place] fall precautions as indicated by assessment.- Educate pt/family on patient safety including physical limitations- Instruct pt to call for assistance with activity based on  assessment- Modify environment to reduce risk of injury- Provide assistive devices as appropriate- Consider OT/PT consult to assist with strengthening/mobility- Encourage toileting schedule  Outcome: Progressing     Problem: CARDIOVASCULAR - ADULT  Goal: Maintains optimal cardiac output and hemodynamic stability  Description: INTERVENTIONS:- Monitor vital signs, rhythm, and trends- Monitor for bleeding, hypotension and signs of decreased cardiac output- Evaluate effectiveness of vasoactive medications to optimize hemodynamic stability- Monitor arterial and/or venous puncture sites for bleeding and/or hematoma- Assess quality of pulses, skin color and temperature- Assess for signs of decreased coronary artery perfusion - ex. Angina- Evaluate fluid balance, assess for edema, trend weights  Outcome: Progressing  Goal: Absence of cardiac arrhythmias or at baseline  Description: INTERVENTIONS:- Continuous cardiac monitoring, monitor vital signs, obtain 12 lead EKG if indicated- Evaluate effectiveness of antiarrhythmic and heart rate control medications as ordered- Initiate emergency measures for life threatening arrhythmias- Monitor electrolytes and administer replacement therapy as ordered  Outcome: Progressing     Problem: METABOLIC/FLUID AND ELECTROLYTES - ADULT  Goal: Electrolytes maintained within normal limits  Description: INTERVENTIONS:- Monitor labs and rhythm and assess patient for signs and symptoms of electrolyte imbalances- Administer electrolyte replacement as ordered- Monitor response to electrolyte replacements, including rhythm and repeat lab results as appropriate- Fluid restriction as ordered- Instruct patient on fluid and nutrition restrictions as appropriate  Outcome: Progressing     Problem: SKIN/TISSUE INTEGRITY - ADULT  Goal: Skin integrity remains intact  Description: INTERVENTIONS- Assess and document risk factors for pressure ulcer development- Assess and document skin integrity- Monitor for  areas of redness and/or skin breakdown- Initiate interventions, skin care algorithm/standards of care as needed  Outcome: Progressing

## 2025-05-01 NOTE — PROGRESS NOTES
[facility] Card[facility] Progress Note        Faustino Bell Patient Status:  Inpatient    1970 MRN [id number]   Location [facility] 8NE-A Attending Ildefonso Polanco,    Hosp Day # 8 PCP Orlando Goodson MD     Subjective:  The patient denies  chest pain and shortness of breath.    Medications:  Scheduled Medications[1]    Continuous Infusions:  Medication Infusions[2]      Allergies:  Allergies[3]      Objective:        Intake/Output:      Intake/Output Summary (Last 24 hours) at 2025 0724  Last data filed at 2025 1441  Gross per 24 hour   Intake 600 ml   Output --   Net 600 ml     Wt Readings from Last 3 Encounters:   25 (!) 427 lb 4 oz (193.8 kg)   24 (!) 432 lb (196 kg)   21 (!) 470 lb (213.2 kg)       Physical Exam:        Vitals:    25 2050 25 2319 25 0026 25 0438   BP: 132/73 134/81  123/73   BP Location: Left arm Left arm  Left arm   Pulse: 92 91  80   Resp: 22 20  20   Temp: 97.6 °F (36.4 °C) 97.2 °F (36.2 °C)  98 °F (36.7 °C)   TempSrc: Oral Oral  Oral   SpO2: 98% 93% 94% 97%   Weight:       Height:           Temp:  [97.2 °F (36.2 °C)-98 °F (36.7 °C)] 98 °F (36.7 °C)  Pulse:  [] 80  Resp:  [20-23] 20  BP: (115-137)/(69-92) 123/73  SpO2:  [92 %-98 %] 97 %      Temp: 98 °F (36.7 °C)  Pulse: 80  Resp: 20  BP: 123/73  General:  Appears comfortable  HEENT: No focal deficits.  Neck: No JVD, carotids 2+ no bruits.  Cardiac: Regular S1S2.  No S3, S4, rub, click.  No murmur.  Lungs: Clear to auscultation and percussion.  Abdomen: Soft, non-tender.   Extremities: No LE edema.  No clubbing or cyanosis.    Neurologic: Alert and oriented, normal affect.  Skin: Warm and dry.           LABS:      HEM:  Recent Labs   Lab 25  0854 25  0452 25  0538 25  0518 25  0457   WBC 7.8 7.8 7.8 7.3 8.4   HGB 12.0* 10.9* 11.8* 11.9* 12.2*   HCT 38.1* 34.4* 37.7* 38.2* 39.3   .0 185.0 193.0 209.0 232.0        Chem:  Recent Labs   Lab 04/25/25  0018 04/25/25  0855   NA  --  141   K 4.0 4.3   CL  --  108   CO2  --  23.0   BUN  --  8*   CREATSERUM  --  0.89   CA  --  9.0   MG  --  2.1   GLU  --  147*       No results for input(s): \"ALT\", \"AST\", \"ALB\", \"AMYLASE\", \"LIPASE\", \"LDH\" in the last 168 hours.    Invalid input(s): \"ALPHOS\", \"TBIL\", \"DBIL\", \"TPROT\"      Recent Labs   Lab 04/27/25  0512 04/28/25  0452 04/29/25  0538 04/30/25  0518 04/30/25  1214 05/01/25  0457   PTT 71.1* 78.8* 94.5* 119.1* 77.6* 102.7*   INR 1.03 1.26* 1.37* 1.59*  --  1.77*           No results found for: \"TROP\", \"CKMB\"      Invalid input(s): \"PBNPML\"                       Diagnostics:   Telemetry: SR    EKG, 4/28/2025,         Echo: 4/28/25    Conclusions:     1. Procedure narrative: Transthoracic echocardiography for ventricular      function evaluation. Image quality was suboptimal. The study was      technically limited due to poor acoustic window availability and body      habitus. Intravenous contrast (Definity) was administered to evaluate      left ventricular function and opacify the LV.   2. Left ventricle: The cavity size was normal. Wall thickness was increased.      Systolic function was normal. The estimated ejection fraction was 60-65%,      by visual assessment. Wall motion is normal; there are no regional wall      motion abnormalities. Unable to assess LV diastolic function.   3. Right ventricle: The cavity size was increased. The RV free wall      longitudinal strain was -23.00%.   4. Pulmonary arteries: Systolic pressure was within the normal range,      estimated to be 28mm Hg. The peak systolic pressure is 28mm Hg.     Cardiac Cath:              Impression:    1.  Saddle PE, RV strain s/p Inari aspiration thrombectomy with large clot removal (4/24/25). Repeat echo, 4/28/25 shows improvement in RVE and dysfunction     2. recurrent PE (3/24), occurred 6 weeks after coumadin cessation--> restarted coumadin therapy, will be  indefinite therapy, hematology following     3. Morbid obesity BMI 61 (450#)     4. LALO     5. Hypothyroidism        Recommend:     1.  PE: stable s/p aspiration thrombectomy.   Improved RV function per repeat echo     2. AC: Re-started coumadin 4/25/25/ bridging with heparin.  DOAC unreliably therapeutic based on weight, per hematology. Pt states he went home on lovenox bridge last time , however at his weight lovenox would not be a reliable ac choice per heme.   Hypercoagulability work-up outpatient. 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      3. Home once INR > 2.0    Pineda Le MD           [1] [2]    continuous dose heparin 2,500 Units/hr (05/01/25 0146)   [3] No Known Allergies     Cantharidin Counseling: Calcipotriene Counseling:  I discussed with the patient the risks of calcipotriene including but not limited to erythema, scaling, itching, and irritation.

## 2025-05-01 NOTE — PAYOR COMM NOTE
CONTINUED STAY REVIEW    Payor: [employer]  Subscriber #:  [id number]  Authorization Number: [id number]    Admit date: 4/23/25  Admit time:  7:47 PM    REVIEW DOCUMENTATION:        5/1    HEME/ONC:     Acute saddle pulmonary embolism with acute cor pulmonale (HCC) [I26.02]  Dyspnea, unspecified type [R06.00]     SUBJECTIVE:     Awake      OBJECTIVE:  Blood pressure 123/73, pulse 80, temperature 98 °F (36.7 °C), temperature source Oral, resp. rate 20,             ASSESSMENT AND PLAN:      Principal Problem:    Acute saddle pulmonary embolism with acute cor pulmonale (HCC)  Active Problems:    Dyspnea, unspecified type    Thyroid nodule           Faustino Bell is a 54 year old male with new PE     Saddle PE and right popliteal DVT  - dx 4/23/25  - second VTE  - s/p mechanical thrombectomy 4/24  - given two incidents of VTE, and now very significant event, recommend indefinite anticoagulation  - Given morbid obesity of BMI 61 and 204 kg, he is not a candidate for direct oral anticoagulants in my opinion.  It was recommend to bridge with heparin drip to warfarin.  - he was on warfarin 7.5mg - 11.25mg previously  - started warfarin 7.5mg on 4/25  - INR goal 2.0 - 3.0  - Pharmacy to dose Coumadin  INR is 1.77  -Continue heparin until INR greater than 2.0            CARDIOLOGY:     Vitals:     04/30/25 2050 04/30/25 2319 05/01/25 0026 05/01/25 0438   BP: 132/73 134/81   123/73   BP Location: Left arm Left arm   Left arm   Pulse: 92 91   80   Resp: 22 20   20   Temp: 97.6 °F (36.4 °C) 97.2 °F (36.2 °C)   98 °F (36.7 °C)   TempSrc: Oral Oral   Oral   SpO2: 98% 93% 94% 97%   Weight:             HEM:          Recent Labs   Lab 04/25/25  0854 04/28/25  0452 04/29/25  0538 04/30/25  0518 05/01/25  0457   WBC 7.8 7.8 7.8 7.3 8.4   HGB 12.0* 10.9* 11.8* 11.9* 12.2*   HCT 38.1* 34.4* 37.7* 38.2* 39.3   .0 185.0 193.0 209.0 232.0                   Recent Labs   Lab 04/27/25  0512 04/28/25  0452 04/29/25  0538  04/30/25  0518 04/30/25  1214 05/01/25  0457   PTT 71.1* 78.8* 94.5* 119.1* 77.6* 102.7*   INR 1.03 1.26* 1.37* 1.59*  --  1.77*          Impression:     1.  Saddle PE, RV strain s/p Inari aspiration thrombectomy with large clot removal (4/24/25). Repeat echo, 4/28/25 shows improvement in RVE and dysfunction     2. 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 Morbid obesity BMI 61 (450#)     4. LALO     5. Hypothyroidism        Recommend:     1.  PE: stable s/p aspiration thrombectomy.   Improved RV function per repeat echo     2. AC: Re-started coumadin 4/25/25/ bridging with heparin.  DOAC unreliably therapeutic based on weight, per hematology. Pt states he went home on lovenox bridge last time , however at his weight lovenox would not be a reliable ac choice per heme.   Hypercoagulability work-up outpatient. 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       3. Home once INR > 2.0     Pineda Le MD            HOSPITALIST:     OBJECTIVE:  /77 (BP Location: Left arm)   Pulse 94   Temp 98.7 °F (37.1 °C) (Oral)   Resp 18   Ht 6' (1.829 m)   Wt (!) 427 lb 4 oz (193.8 kg)   SpO2 90%   BMI 57.95 kg/m²   General: No apparent distress.  Alert and oriented.  HEENT:  EOMI, PERRLA.  Pulm: Clear breath sounds bilaterally.  Normal respiratory effort.  CV: Regular rate and rhythm, no murmur.   Abd: Obese abdomen, nontender, nondistended.   MSK: Peripheral edema bilaterally.   Skin: No lesions or rashes.  Neuro: No obvious focal deficits.      Assessment/ Plan:      #Acute hypoxic resp failure  2/2 saddle PE with evidence of right heart strain  #Right popliteal DVT- chronic?  -currently on room air, monitor spo2  -s/p thrombolysis 4/24/25 >>  discussed with patient bedside   -heparin gtt- previously on coumadin  -heme/onc consulted >> kim Echeverria  Yolande >> plan for resuming coumadin (lifelong) with lovenox bridge  -not a candidate for DOAC considering BMI   -daily INR  -cards following >> plan to repeat limited TTE prior to dc to re-assess RV function/RVSP     #LALO  -CPAP nightly     #Hypothyroidism  -resume home levothyroxine once verified     DVT ppx: heparin gtt  Diet: regular      DISPO:  Cont inpatient   INR 1.77 today   Per heme/onc strongly rec this pt stay in house for bridge given high dose of lovenox needed-- goal INR 2-3 . Likely DC in next 1-2 days   Cards and Onc following     Outpatient records or previous hospital records reviewed.   [facility] hospitalist to continue to follow patient while in house.     Ildefonso Polanco DO    MEDICATIONS ADMINISTERED IN LAST 1 DAY:  acetaminophen (Tylenol Extra Strength) tab 500 mg       Date Action Dose Route User    5/1/2025 1238 Given 500 mg Oral Cecy Sanchez RN          heparin (Porcine) 77185 units/250mL infusion PE/DVT/THROMBUS CONTINUOUS       Date Action Dose Route User    5/1/2025 1108 New Bag 2,500 Units/hr Intravenous Cecy Sanchez RN    5/1/2025 0146 New Bag 2,500 Units/hr Intravenous Trina Padgett RN          warfarin (Coumadin) tab 12.5 mg       Date Action Dose Route User    4/30/2025 2052 Given 12.5 mg Oral Trina Padgett RN            Vitals (last day)       Date/Time Temp Pulse Resp BP SpO2 Weight O2 Device O2 Flow Rate (L/min) [facility]    05/01/25 1239 98.9 °F (37.2 °C) 101 18 150/89 91 % -- None (Room air) --     05/01/25 0813 98.7 °F (37.1 °C) 94 18 154/77 90 % -- None (Room air) --     05/01/25 0438 98 °F (36.7 °C) 80 20 123/73 97 % -- CPAP --     05/01/25 0026 -- -- -- -- 94 % -- -- -- EH    04/30/25 2319 97.2 °F (36.2 °C) 91 20 134/81 93 % -- None (Room air) 0 L/min     04/30/25 2050 97.6 °F (36.4 °C) 92 22 132/73 98 % -- None (Room air) -- LO    04/30/25 1548 97.4 °F (36.3 °C) 89 23 137/92 96 % -- None (Room air) -- LS    04/30/25 1529 -- 119 -- -- -- -- -- -- LS     04/30/25 1147 -- 79 20 115/69 96 % -- CPAP -- LS    04/30/25 1145 -- 94 -- -- -- -- -- -- LS    04/30/25 0830 -- 90 23 130/76 92 % -- None (Room air) -- LS    04/30/25 0533 -- 87 -- -- -- 427 lb 4 oz (193.8 kg) -- -- JJ    04/30/25 0458 98 °F (36.7 °C) 90 18 112/74 94 % -- None (Room air) -- TR    04/30/25 0107 -- 77 -- -- -- -- -- -- LP           Latest Reference Range & Units 04/29/25 05:38 04/30/25 05:18 04/30/25 12:14 05/01/25 04:57   PT 11.6 - 14.8 seconds 17.0 (H) 19.1 (H)  20.8 (H)   INR 0.80 - 1.20  1.37 (H) 1.59 (H)  1.77 (H)   APTT 23.0 - 36.0 seconds 94.5 (H) 119.1 (H) 77.6 (H) 102.7 (H)   WBC 4.0 - 11.0 x10(3) uL 7.8 7.3  8.4   Hemoglobin 13.0 - 17.5 g/dL 11.8 (L) 11.9 (L)  12.2 (L)   Hematocrit 39.0 - 53.0 % 37.7 (L) 38.2 (L)  39.3   Platelet Count 150.0 - 450.0 10(3)uL 193.0 209.0  232.0   RBC 4.30 - 5.70 x10(6)uL 4.64 4.80  4.96   MCH 26.0 - 34.0 pg 25.4 (L) 24.8 (L)  24.6 (L)   MCHC 31.0 - 37.0 g/dL 31.3 31.2  31.0   MCV 80.0 - 100.0 fL 81.3 79.6 (L)  79.2 (L)   (H): Data is abnormally high  (L): Data is abnormally low

## 2025-05-01 NOTE — PLAN OF CARE
A&Ox4. RA, CPAP overnight. No shortness of breath. . NSR on telemetry. No chest pain. Heparin gtt infusing. Continent of bowel and bladder. No complaints of pain. Up SBA. In bed. Pt refusing bed alarm, pt educated on the importance of using the bed alarm and calling before ambulating. Discussed plan of care with patient. Patient verbalizes understanding.    Plan of care: coumadin bridging, heparin gtt infusing at 25ml/hr.    Problem: RESPIRATORY - ADULT  Goal: Achieves optimal ventilation and oxygenation  Description: INTERVENTIONS:- Assess for changes in respiratory status- Assess for changes in mentation and behavior- Position to facilitate oxygenation and minimize respiratory effort- Oxygen supplementation based on oxygen saturation or ABGs- Provide Smoking Cessation handout, if applicable- Encourage broncho-pulmonary hygiene including cough, deep breathe, Incentive Spirometry- Assess the need for suctioning and perform as needed- Assess and instruct to report SOB or any respiratory difficulty- Respiratory Therapy support as indicated- Manage/alleviate anxiety- Monitor for signs/symptoms of CO2 retention  Outcome: Progressing     Problem: Patient/Family Goals  Goal: Patient/Family Long Term Goal  Description: Patient's Long Term Goal: Pt will go home   Interventions:  - heparin gtt  Outcome: Progressing  Goal: Patient/Family Short Term Goal  Description: Patient's Short Term Goal:patient will feel better, reduced shortness of breath   Interventions;  - heparin gtt    Outcome: Progressing     Problem: SAFETY ADULT - FALL  Goal: Free from fall injury  Description: INTERVENTIONS:- Assess pt frequently for physical needs- Identify cognitive and physical deficits and behaviors that affect risk of falls.- [place] fall precautions as indicated by assessment.- Educate pt/family on patient safety including physical limitations- Instruct pt to call for assistance with activity based on assessment- Modify environment  to reduce risk of injury- Provide assistive devices as appropriate- Consider OT/PT consult to assist with strengthening/mobility- Encourage toileting schedule  Outcome: Progressing     Problem: CARDIOVASCULAR - ADULT  Goal: Maintains optimal cardiac output and hemodynamic stability  Description: INTERVENTIONS:- Monitor vital signs, rhythm, and trends- Monitor for bleeding, hypotension and signs of decreased cardiac output- Evaluate effectiveness of vasoactive medications to optimize hemodynamic stability- Monitor arterial and/or venous puncture sites for bleeding and/or hematoma- Assess quality of pulses, skin color and temperature- Assess for signs of decreased coronary artery perfusion - ex. Angina- Evaluate fluid balance, assess for edema, trend weights  Outcome: Progressing  Goal: Absence of cardiac arrhythmias or at baseline  Description: INTERVENTIONS:- Continuous cardiac monitoring, monitor vital signs, obtain 12 lead EKG if indicated- Evaluate effectiveness of antiarrhythmic and heart rate control medications as ordered- Initiate emergency measures for life threatening arrhythmias- Monitor electrolytes and administer replacement therapy as ordered  Outcome: Progressing     Problem: METABOLIC/FLUID AND ELECTROLYTES - ADULT  Goal: Electrolytes maintained within normal limits  Description: INTERVENTIONS:- Monitor labs and rhythm and assess patient for signs and symptoms of electrolyte imbalances- Administer electrolyte replacement as ordered- Monitor response to electrolyte replacements, including rhythm and repeat lab results as appropriate- Fluid restriction as ordered- Instruct patient on fluid and nutrition restrictions as appropriate  Outcome: Progressing     Problem: SKIN/TISSUE INTEGRITY - ADULT  Goal: Skin integrity remains intact  Description: INTERVENTIONS- Assess and document risk factors for pressure ulcer development- Assess and document skin integrity- Monitor for areas of redness and/or skin  breakdown- Initiate interventions, skin care algorithm/standards of care as needed  Outcome: Progressing

## 2025-05-01 NOTE — PROGRESS NOTES
[facility] HEMATOLOGY ONCOLOGY  Progress Note    Faustino Bell Patient Status:  Inpatient    1970 MRN [id number]   Location [facility] 8NE-A Attending Ildefonso Polanco,    Hosp Day # 8 PCP Orlando Goodson MD     ADMISSION DATE AND TIME: 2025 12:06 PM    ADMIT DIAGNOSIS: Thyroid nodule [E04.1]  Acute saddle pulmonary embolism with acute cor pulmonale (HCC) [I26.02]  Dyspnea, unspecified type [R06.00]    SUBJECTIVE:    Awake     OBJECTIVE:  Blood pressure 123/73, pulse 80, temperature 98 °F (36.7 °C), temperature source Oral, resp. rate 20, height 6' (1.829 m), weight (!) 427 lb 4 oz (193.8 kg), SpO2 97%.    PHYSICAL EXAM:    Sitting in bed  No distress           LABS:  Recent Results (from the past 24 hours)   PTT, Activated    Collection Time: 25 12:14 PM   Result Value Ref Range    PTT 77.6 (H) 23.0 - 36.0 seconds   Prothrombin Time (PT)    Collection Time: 25  4:57 AM   Result Value Ref Range    PT 20.8 (H) 11.6 - 14.8 seconds    INR 1.77 (H) 0.80 - 1.20   CBC With Differential With Platelet    Collection Time: 25  4:57 AM   Result Value Ref Range    WBC 8.4 4.0 - 11.0 x10(3) uL    RBC 4.96 4.30 - 5.70 x10(6)uL    HGB 12.2 (L) 13.0 - 17.5 g/dL    HCT 39.3 39.0 - 53.0 %    .0 150.0 - 450.0 10(3)uL    MCV 79.2 (L) 80.0 - 100.0 fL    MCH 24.6 (L) 26.0 - 34.0 pg    MCHC 31.0 31.0 - 37.0 g/dL    RDW 18.6 %    Neutrophil Absolute Prelim 5.32 1.50 - 7.70 x10 (3) uL    Neutrophil Absolute 5.32 1.50 - 7.70 x10(3) uL    Lymphocyte Absolute 2.20 1.00 - 4.00 x10(3) uL    Monocyte Absolute 0.55 0.10 - 1.00 x10(3) uL    Eosinophil Absolute 0.22 0.00 - 0.70 x10(3) uL    Basophil Absolute 0.03 0.00 - 0.20 x10(3) uL    Immature Granulocyte Absolute 0.08 0.00 - 1.00 x10(3) uL    Neutrophil % 63.3 %    Lymphocyte % 26.2 %    Monocyte % 6.5 %    Eosinophil % 2.6 %    Basophil % 0.4 %    Immature Granulocyte % 1.0 %   PTT, Activated    Collection Time: 25  4:57 AM    Result Value Ref Range    .7 (H) 23.0 - 36.0 seconds       CULTURES  No results found for this visit on 04/23/25.    IMAGING:    No results found.      MEDICATIONS:  Medications reviewed.    Scheduled Medications[1]  Medication Infusions[2]  PRN Medications[3]    ASSESSMENT AND PLAN:     Principal Problem:    Acute saddle pulmonary embolism with acute cor pulmonale (HCC)  Active Problems:    Dyspnea, unspecified type    Thyroid nodule        Faustino Bell is a 54 year old male with new PE    Saddle PE and right popliteal DVT  - dx 4/23/25  - second VTE  - s/p mechanical thrombectomy 4/24  - given two incidents of VTE, and now very significant event, recommend indefinite anticoagulation  - Given morbid obesity of BMI 61 and 204 kg, he is not a candidate for direct oral anticoagulants in my opinion.  It was recommend to bridge with heparin drip to warfarin.  - he was on warfarin 7.5mg - 11.25mg previously  - started warfarin 7.5mg on 4/25  - INR goal 2.0 - 3.0  - Pharmacy to dose Coumadin  INR is 1.77  -Continue heparin until INR greater than 2.0  Then plan DC         Gasper Gomez MD           [1] [2]    continuous dose heparin 2,500 Units/hr (05/01/25 0146)   [3]   acetaminophen

## 2025-05-01 NOTE — PROGRESS NOTES
[facility] Hospitalist Progress Note     Subjective:  No acute overnight events.  No new complaints.     Review of Systems  Comprehensive ROS reviewed and negative except for what is stated in HPI.      OBJECTIVE:  /77 (BP Location: Left arm)   Pulse 94   Temp 98.7 °F (37.1 °C) (Oral)   Resp 18   Ht 6' (1.829 m)   Wt (!) 427 lb 4 oz (193.8 kg)   SpO2 90%   BMI 57.95 kg/m²   General: No apparent distress.  Alert and oriented.  HEENT:  EOMI, PERRLA.  Pulm: Clear breath sounds bilaterally.  Normal respiratory effort.  CV: Regular rate and rhythm, no murmur.   Abd: Obese abdomen, nontender, nondistended.   MSK: Peripheral edema bilaterally.   Skin: No lesions or rashes.  Neuro: No obvious focal deficits.      Data Review:    LABS:   Lab Results   Component Value Date    WBC 8.4 05/01/2025    HGB 12.2 05/01/2025    HCT 39.3 05/01/2025    .0 05/01/2025    .7 05/01/2025    INR 1.77 05/01/2025    PTP 20.8 05/01/2025       All lab work and imaging personally reviewed.    Assessment/Plan:     Assessment/ Plan:     #Acute hypoxic resp failure  2/2 saddle PE with evidence of right heart strain  #Right popliteal DVT- chronic?  -currently on room air, monitor spo2  -s/p thrombolysis 4/24/25 >>  discussed with patient bedside   -heparin gtt- previously on coumadin  -heme/onc consulted >> kim Lanier >> plan for resuming coumadin (lifelong) with lovenox bridge  -not a candidate for DOAC considering BMI   -daily INR  -cards following >> plan to repeat limited TTE prior to dc to re-assess RV function/RVSP     #LALO  -CPAP nightly     #Hypothyroidism  -resume home levothyroxine once verified     DVT ppx: heparin gtt  Diet: regular     DISPO:  Cont inpatient   INR 1.77 today   Per heme/onc strongly rec this pt stay in house for bridge given high dose of lovenox needed-- goal INR 2-3 . Likely DC in next 1-2 days   Cards and Onc following     Outpatient records or previous hospital records reviewed.    GUILLERMINA hospitalist to continue to follow patient while in house.    DO [facility]

## 2025-05-02 VITALS
SYSTOLIC BLOOD PRESSURE: 156 MMHG | OXYGEN SATURATION: 95 % | DIASTOLIC BLOOD PRESSURE: 75 MMHG | RESPIRATION RATE: 18 BRPM | WEIGHT: 315 LBS | TEMPERATURE: 98 F | HEART RATE: 91 BPM | BODY MASS INDEX: 42.66 KG/M2 | HEIGHT: 72 IN

## 2025-05-02 LAB
APTT PPP: 150.5 SECONDS (ref 23–36)
BASOPHILS # BLD AUTO: 0.04 X10(3) UL (ref 0–0.2)
BASOPHILS NFR BLD AUTO: 0.5 %
EOSINOPHIL # BLD AUTO: 0.26 X10(3) UL (ref 0–0.7)
EOSINOPHIL NFR BLD AUTO: 3.4 %
ERYTHROCYTE [DISTWIDTH] IN BLOOD BY AUTOMATED COUNT: 18.5 %
HCT VFR BLD AUTO: 38.1 % (ref 39–53)
HGB BLD-MCNC: 12 G/DL (ref 13–17.5)
IMM GRANULOCYTES # BLD AUTO: 0.06 X10(3) UL (ref 0–1)
IMM GRANULOCYTES NFR BLD: 0.8 %
INR BLD: 2.18 (ref 0.8–1.2)
LYMPHOCYTES # BLD AUTO: 2.16 X10(3) UL (ref 1–4)
LYMPHOCYTES NFR BLD AUTO: 28.1 %
MCH RBC QN AUTO: 24.8 PG (ref 26–34)
MCHC RBC AUTO-ENTMCNC: 31.5 G/DL (ref 31–37)
MCV RBC AUTO: 78.7 FL (ref 80–100)
MONOCYTES # BLD AUTO: 0.65 X10(3) UL (ref 0.1–1)
MONOCYTES NFR BLD AUTO: 8.5 %
NEUTROPHILS # BLD AUTO: 4.51 X10 (3) UL (ref 1.5–7.7)
NEUTROPHILS # BLD AUTO: 4.51 X10(3) UL (ref 1.5–7.7)
NEUTROPHILS NFR BLD AUTO: 58.7 %
PLATELET # BLD AUTO: 211 10(3)UL (ref 150–450)
PROTHROMBIN TIME: 24.4 SECONDS (ref 11.6–14.8)
RBC # BLD AUTO: 4.84 X10(6)UL (ref 4.3–5.7)
WBC # BLD AUTO: 7.7 X10(3) UL (ref 4–11)

## 2025-05-02 PROCEDURE — 85610 PROTHROMBIN TIME: CPT | Performed by: INTERNAL MEDICINE

## 2025-05-02 PROCEDURE — 94799 UNLISTED PULMONARY SVC/PX: CPT

## 2025-05-02 PROCEDURE — 85730 THROMBOPLASTIN TIME PARTIAL: CPT | Performed by: INTERNAL MEDICINE

## 2025-05-02 PROCEDURE — 85025 COMPLETE CBC W/AUTO DIFF WBC: CPT | Performed by: INTERNAL MEDICINE

## 2025-05-02 RX ORDER — LEVOTHYROXINE SODIUM 50 UG/1
50 TABLET ORAL DAILY
Status: ON HOLD | COMMUNITY
Start: 2024-12-19 | End: 2025-05-02

## 2025-05-02 RX ORDER — LEVOTHYROXINE SODIUM 50 UG/1
50 TABLET ORAL DAILY
Qty: 30 TABLET | Refills: 0 | Status: SHIPPED | OUTPATIENT
Start: 2025-05-02 | End: 2025-06-01

## 2025-05-02 RX ORDER — WARFARIN SODIUM 7.5 MG/1
11.25 TABLET ORAL DAILY
Qty: 15 TABLET | Refills: 0 | Status: SHIPPED | OUTPATIENT
Start: 2025-05-02 | End: 2025-05-12

## 2025-05-02 NOTE — PLAN OF CARE
Patient Alert and oriented x 4. Up at [place]. On room air and cpap at night, lungs sounds clear and diminished. NSR on tele, heparin gtt infusing. Continent of bowel and bladder. No complaint of pain, chest pain, and/or SOB. POC discussed with patient. Call light within reach. Fall precaution in place.    Problem: RESPIRATORY - ADULT  Goal: Achieves optimal ventilation and oxygenation  Description: INTERVENTIONS:- Assess for changes in respiratory status- Assess for changes in mentation and behavior- Position to facilitate oxygenation and minimize respiratory effort- Oxygen supplementation based on oxygen saturation or ABGs- Provide Smoking Cessation handout, if applicable- Encourage broncho-pulmonary hygiene including cough, deep breathe, Incentive Spirometry- Assess the need for suctioning and perform as needed- Assess and instruct to report SOB or any respiratory difficulty- Respiratory Therapy support as indicated- Manage/alleviate anxiety- Monitor for signs/symptoms of CO2 retention  Outcome: Progressing     Problem: Patient/Family Goals  Goal: Patient/Family Long Term Goal  Description: Patient's Long Term Goal: Pt will go home   Interventions:  - heparin gtt  Outcome: Progressing  Goal: Patient/Family Short Term Goal  Description: Patient's Short Term Goal:patient will feel better, reduced shortness of breath   Interventions;  - heparin gtt    Outcome: Progressing     Problem: CARDIOVASCULAR - ADULT  Goal: Maintains optimal cardiac output and hemodynamic stability  Description: INTERVENTIONS:- Monitor vital signs, rhythm, and trends- Monitor for bleeding, hypotension and signs of decreased cardiac output- Evaluate effectiveness of vasoactive medications to optimize hemodynamic stability- Monitor arterial and/or venous puncture sites for bleeding and/or hematoma- Assess quality of pulses, skin color and temperature- Assess for signs of decreased coronary artery perfusion - ex. Angina- Evaluate fluid balance,  assess for edema, trend weights  Outcome: Progressing  Goal: Absence of cardiac arrhythmias or at baseline  Description: INTERVENTIONS:- Continuous cardiac monitoring, monitor vital signs, obtain 12 lead EKG if indicated- Evaluate effectiveness of antiarrhythmic and heart rate control medications as ordered- Initiate emergency measures for life threatening arrhythmias- Monitor electrolytes and administer replacement therapy as ordered  Outcome: Progressing     Problem: METABOLIC/FLUID AND ELECTROLYTES - ADULT  Goal: Electrolytes maintained within normal limits  Description: INTERVENTIONS:- Monitor labs and rhythm and assess patient for signs and symptoms of electrolyte imbalances- Administer electrolyte replacement as ordered- Monitor response to electrolyte replacements, including rhythm and repeat lab results as appropriate- Fluid restriction as ordered- Instruct patient on fluid and nutrition restrictions as appropriate  Outcome: Progressing

## 2025-05-02 NOTE — PROGRESS NOTES
[facility] HEMATOLOGY ONCOLOGY  Progress Note    Faustino Bell Patient Status:  Inpatient    1970 MRN [id number]   Location [place] 8NE-A Attending Ildefonso Polanco,    Hosp Day # 9 PCP Orlando Goodson MD     ADMISSION DATE AND TIME: 2025 12:06 PM    ADMIT DIAGNOSIS: Thyroid nodule [E04.1]  Acute saddle pulmonary embolism with acute cor pulmonale (HCC) [I26.02]  Dyspnea, unspecified type [R06.00]    SUBJECTIVE:    Awake     OBJECTIVE:  Blood pressure (!) 147/105, pulse 83, temperature 98 °F (36.7 °C), temperature source Oral, resp. rate 16, height 6' (1.829 m), weight (!) 427 lb 4 oz (193.8 kg), SpO2 96%.    PHYSICAL EXAM:    Sitting in bed  No distress           LABS:  Recent Results (from the past 24 hours)   Prothrombin Time (PT)    Collection Time: 25  5:31 AM   Result Value Ref Range    PT 24.4 (H) 11.6 - 14.8 seconds    INR 2.18 (H) 0.80 - 1.20   CBC With Differential With Platelet    Collection Time: 25  5:31 AM   Result Value Ref Range    WBC 7.7 4.0 - 11.0 x10(3) uL    RBC 4.84 4.30 - 5.70 x10(6)uL    HGB 12.0 (L) 13.0 - 17.5 g/dL    HCT 38.1 (L) 39.0 - 53.0 %    .0 150.0 - 450.0 10(3)uL    MCV 78.7 (L) 80.0 - 100.0 fL    MCH 24.8 (L) 26.0 - 34.0 pg    MCHC 31.5 31.0 - 37.0 g/dL    RDW 18.5 %    Neutrophil Absolute Prelim 4.51 1.50 - 7.70 x10 (3) uL    Neutrophil Absolute 4.51 1.50 - 7.70 x10(3) uL    Lymphocyte Absolute 2.16 1.00 - 4.00 x10(3) uL    Monocyte Absolute 0.65 0.10 - 1.00 x10(3) uL    Eosinophil Absolute 0.26 0.00 - 0.70 x10(3) uL    Basophil Absolute 0.04 0.00 - 0.20 x10(3) uL    Immature Granulocyte Absolute 0.06 0.00 - 1.00 x10(3) uL    Neutrophil % 58.7 %    Lymphocyte % 28.1 %    Monocyte % 8.5 %    Eosinophil % 3.4 %    Basophil % 0.5 %    Immature Granulocyte % 0.8 %   PTT, Activated    Collection Time: 25  5:31 AM   Result Value Ref Range    .5 (H) 23.0 - 36.0 seconds       CULTURES  No results found for this visit  on 04/23/25.    IMAGING:    No results found.      MEDICATIONS:  Medications reviewed.    Scheduled Medications[1]  Medication Infusions[2]  PRN Medications[3]    ASSESSMENT AND PLAN:     Principal Problem:    Acute saddle pulmonary embolism with acute cor pulmonale (HCC)  Active Problems:    Dyspnea, unspecified type    Thyroid nodule        Faustino Bell is a 54 year old male with new PE    Saddle PE and right popliteal DVT  - dx 4/23/25  - second VTE  - s/p mechanical thrombectomy 4/24  - given two incidents of VTE, and now very significant event, recommend indefinite anticoagulation  - Given morbid obesity of BMI 61 and 204 kg, he is not a candidate for direct oral anticoagulants in my opinion.  It was recommend to bridge with heparin drip to warfarin.  - he was on warfarin 7.5mg - 11.25mg previously  - started warfarin 7.5mg on 4/25  - INR goal 2.0 - 3.0  - Pharmacy to dose Coumadin  INR is 2+    Plan DC today with Follow up with his hematologist Dr Goodson and the coumadin clinic          Gasper Gomez MD           [1] [2]    continuous dose heparin 2,500 Units/hr (05/01/25 2111)   [3]   acetaminophen

## 2025-05-02 NOTE — CONSULTS
[facility] Dosing Service  Warfarin (Coumadin) Subsequent Dosing    Faustino Bell is a 54 year old patient for whom pharmacy is dosing warfarin (Coumadin). Goal INR is 2-3    Recent Labs   Lab 04/28/25  0452 04/29/25  0538 04/30/25  0518 05/01/25  0457 05/02/25  0531   INR 1.26* 1.37* 1.59* 1.77* 2.18*       Consulted by:  Dr Lanier  Indication: PE  Potential Drug Interactions: none  Other Anticoagulants:  heparin gtt bridge  Home regimen (if applicable): 11.25mg on Mon/Fri and 7.5mg rest of week     Inpatient Dosing History:    Date 4/25 4/26 4/27 4/28 4/29 4/30 5/1 5/2   INR 1.07 1.08 1.03 1.26 1.37  1.59 1.77 2.18   Coumadin dose 7.5mg 10mg 10mg 12.5mg  15 mg  12.5 mg 12.5             Based on above -  1.  For today, Give warfarin (COUMADIN) 11 mg at 2100 tonight  2   PT/INR ordered daily while on warfarin  3.  Pharmacy will continue to follow.  We appreciate the opportunity to assist in the care of this patient.    Dee Sweeney, PharmD  5/2/2025  9:57 AM

## 2025-05-02 NOTE — PLAN OF CARE
NURSING DISCHARGE NOTE    Discharged Home via Wheelchair.  Accompanied by Spouse  Belongings Taken by patient/family.    Telemetry dc'd. IV removed, catheter intact. Discharge teaching completed, pt verbalized understanding using teach back method.

## 2025-05-02 NOTE — PROGRESS NOTES
[facility] Cardiology Progress Note        Faustino Bell Patient Status:  Inpatient    1970 MRN [id number]   [facility] 8NE-A Attending Ildefonso Polanco,    Hosp Day # 9 PCP Orlando Goodson MD     Subjective:  The patient denies  chest pain and shortness of breath.    Medications:  Scheduled Medications[1]    Continuous Infusions:  Medication Infusions[2]      Allergies:  Allergies[3]      Objective:        Intake/Output:      Intake/Output Summary (Last 24 hours) at 2025 0842  Last data filed at 2025 0820  Gross per 24 hour   Intake 890 ml   Output --   Net 890 ml     Wt Readings from Last 3 Encounters:   25 (!) 427 lb 4 oz (193.8 kg)   24 (!) 432 lb (196 kg)   21 (!) 470 lb (213.2 kg)       Physical Exam:        Vitals:    25 1921 25 2300 25 0607 25 0820   BP: (!) 139/96 118/73 (!) 147/105 156/75   BP Location: Left arm Left arm Left arm Radial   Pulse: 92 84 83 91   Resp: 19 20 16 18   Temp: 98.1 °F (36.7 °C) 98.4 °F (36.9 °C) 98 °F (36.7 °C) 98.2 °F (36.8 °C)   TempSrc: Oral Oral Oral Oral   SpO2: 93%  96% 95%   Weight:       Height:           Temp:  [98 °F (36.7 °C)-98.9 °F (37.2 °C)] 98.2 °F (36.8 °C)  Pulse:  [] 91  Resp:  [16-20] 18  BP: (118-156)/() 156/75  SpO2:  [91 %-96 %] 95 %      Temp: 98.2 °F (36.8 °C)  Pulse: 91  Resp: 18  BP: 156/75  General:  Appears comfortable  HEENT: No focal deficits.  Neck: No JVD, carotids 2+ no bruits.  Cardiac: Regular S1S2.  No S3, S4, rub, click.  No murmur.  Lungs: Clear to auscultation and percussion.  Abdomen: Soft, non-tender.   Extremities: No LE edema.  No clubbing or cyanosis.    Neurologic: Alert and oriented, normal affect.  Skin: Warm and dry.           LABS:      HEM:  Recent Labs   Lab 25  0452 25  0538 25  0518 25  0457 25  0531   WBC 7.8 7.8 7.3 8.4 7.7   HGB 10.9* 11.8* 11.9* 12.2* 12.0*   HCT 34.4* 37.7* 38.2* 39.3 38.1*    .0 193.0 209.0 232.0 211.0       Chem:  Recent Labs   Lab 04/25/25  0855      K 4.3      CO2 23.0   BUN 8*   CREATSERUM 0.89   CA 9.0   MG 2.1   *       No results for input(s): \"ALT\", \"AST\", \"ALB\", \"AMYLASE\", \"LIPASE\", \"LDH\" in the last 168 hours.    Invalid input(s): \"ALPHOS\", \"TBIL\", \"DBIL\", \"TPROT\"      Recent Labs   Lab 04/28/25  0452 04/29/25  0538 04/30/25  0518 04/30/25  1214 05/01/25  0457 05/02/25  0531   PTT 78.8* 94.5* 119.1* 77.6* 102.7* 150.5*   INR 1.26* 1.37* 1.59*  --  1.77* 2.18*           No results found for: \"TROP\", \"CKMB\"      Invalid input(s): \"PBNPML\"                       Diagnostics:   Telemetry: SR    EKG, 4/28/2025,         Echo: 4/28/25    Conclusions:     1. Procedure narrative: Transthoracic echocardiography for ventricular      function evaluation. Image quality was suboptimal. The study was      technically limited due to poor acoustic window availability and body      habitus. Intravenous contrast (Definity) was administered to evaluate      left ventricular function and opacify the LV.   2. Left ventricle: The cavity size was normal. Wall thickness was increased.      Systolic function was normal. The estimated ejection fraction was 60-65%,      by visual assessment. Wall motion is normal; there are no regional wall      motion abnormalities. Unable to assess LV diastolic function.   3. Right ventricle: The cavity size was increased. The RV free wall      longitudinal strain was -23.00%.   4. Pulmonary arteries: Systolic pressure was within the normal range,      estimated to be 28mm Hg. The peak systolic pressure is 28mm Hg.     Cardiac Cath:              Impression:    1.  Saddle PE, RV strain s/p Inari aspiration thrombectomy with large clot removal (4/24/25). Repeat echo, 4/28/25 shows improvement in RVE and dysfunction     2. recurrent PE (3/24), occurred 6 weeks after coumadin cessation--> restarted coumadin therapy, will be indefinite therapy,  hematology following     3. Morbid obesity BMI 61 (450#)     4. LALO     5. Hypothyroidism        Recommend:     1.  PE: stable s/p aspiration thrombectomy.   Improved RV function per repeat echo     2. AC: Re-started coumadin 4/25/25/ bridging with heparin.  DOAC unreliably therapeutic based on weight, per hematology. Pt states he went home on lovenox bridge last time , however at his weight lovenox would not be a reliable ac choice per heme.   Hypercoagulability work-up outpatient. 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      3. INR = 2.18. Home today    Pineda Le MD           [1] [2]    continuous dose heparin Stopped (05/02/25 0715)   [3] No Known Allergies

## 2025-05-02 NOTE — DISCHARGE SUMMARY
[facility] Hospitalist Discharge Summary     Patient ID:  Faustino Bell  54 year old  5/30/1970    Admit date: 4/23/2025  Discharge date and time:  5/2/25  Attending Physician: Ildefonso Polanco DO   Primary Care Physician: Orlando Goodson MD   Discharge Diagnoses: Thyroid nodule [E04.1]  Acute saddle pulmonary embolism with acute cor pulmonale (HCC) [I26.02]  Dyspnea, unspecified type [R06.00]    Discharge Condition: Stable    Disposition:  Home    Follow up:   -Coumadin clinic with INR check Monday 5/5  -Oncology within 2 weeks as advised  -PCP within 2 weeks as advised.     Hospital Course:  Patient is a 54 year old male with history of LALO on CPAP, PE/ DVT (recently off coumadin), morbid obesity who presented for acute dyspnea found to have acute saddle PE with right heart strain. Oncology and cardiology consulted, and patient underwent thrombolysis successfully.     #Acute hypoxic resp failure  2/2 saddle PE with evidence of right heart strain  #Right popliteal DVT- chronic?  -currently on room air, monitor spo2  -s/p thrombolysis 4/24/25 >>  discussed with patient bedside   -not a candidate for DOAC considering BMI   -cards following, repeat echo stable  -oncology following, heparin gtt with coumadin bridge-- INR now therapeutic. Will dc  with same dose coumadin per oncology and pharmacy. He is to have INR checked Monday 5/5 and follow up with his oncologist within 2 weeks. Discussed at bedside with patient.     #LALO  -CPAP nightly     #Hypothyroidism  -resume home levothyroxine , follow up with PCP regarding repeat TFT as needed and dose adjustment.     Exam on Day of DC:  /75 (BP Location: Radial)   Pulse 91   Temp 98.2 °F (36.8 °C) (Oral)   Resp 18   Ht 6' (1.829 m)   Wt (!) 427 lb 4 oz (193.8 kg)   SpO2 95%   BMI 57.95 kg/m²   General: No apparent distress.  Alert and oriented.  HEENT:  EOMI, PERRLA.  Pulm: Clear breath sounds bilaterally.  Normal respiratory effort.  CV: Regular rate and rhythm, no  murmur.   Abd: Obese abdomen, nontender, nondistended.   MSK: Peripheral edema bilaterally.   Skin: No lesions or rashes.  Neuro: No obvious focal deficits.    I as the attending physician reconciled the current and discharge medications on day of discharge.     Current Discharge Medication List        START taking these medications    Details   warfarin 7.5 MG Oral Tab Take 1.5 tablets (11.25 mg total) by mouth daily for 10 days.           CONTINUE these medications which have CHANGED    Details   levothyroxine 50 MCG Oral Tab Take 1 tablet (50 mcg total) by mouth daily.           STOP taking these medications       aspirin 81 MG Oral Tab EC              Activity: activity as tolerated  Diet: regular diet  Wound Care: as directed  Code Status: No Order    Total time coordinating care for discharge: Greater than 36 minutes    IldefonsoDO Marcy Aragon [facility] Edgewoodist

## 2025-05-02 NOTE — PLAN OF CARE
Assumed care from nocturnal RN. Patient is A&O x4 and was pleasant and cooperative throughout the shift. Patient is able to ambulate independently in the room. Telemetry monitor reads NSR and O2 is stable on room air. BLE are red/scaling, otherwise skin is c/d/i. INR within goal range this morning. Heparin gtt stopped per hematology. Planning discharge. Education was provided on plan of care, and patient verbalized understanding. Patient safety was maintained. Call light and belongings are in reach.         Problem: RESPIRATORY - ADULT  Goal: Achieves optimal ventilation and oxygenation  Description: INTERVENTIONS:- Assess for changes in respiratory status- Assess for changes in mentation and behavior- Position to facilitate oxygenation and minimize respiratory effort- Oxygen supplementation based on oxygen saturation or ABGs- Provide Smoking Cessation handout, if applicable- Encourage broncho-pulmonary hygiene including cough, deep breathe, Incentive Spirometry- Assess the need for suctioning and perform as needed- Assess and instruct to report SOB or any respiratory difficulty- Respiratory Therapy support as indicated- Manage/alleviate anxiety- Monitor for signs/symptoms of CO2 retention  Outcome: Adequate for Discharge     Problem: Patient/Family Goals  Goal: Patient/Family Long Term Goal  Description: Patient's Long Term Goal: Pt will go home   Interventions:  - heparin gtt  Outcome: Adequate for Discharge  Goal: Patient/Family Short Term Goal  Description: Patient's Short Term Goal:patient will feel better, reduced shortness of breath   Interventions;  - heparin gtt    Outcome: Adequate for Discharge     Problem: SAFETY ADULT - FALL  Goal: Free from fall injury  Description: INTERVENTIONS:- Assess pt frequently for physical needs- Identify cognitive and physical deficits and behaviors that affect risk of falls.- [place] fall precautions as indicated by assessment.- Educate pt/family on patient safety  including physical limitations- Instruct pt to call for assistance with activity based on assessment- Modify environment to reduce risk of injury- Provide assistive devices as appropriate- Consider OT/PT consult to assist with strengthening/mobility- Encourage toileting schedule  Outcome: Adequate for Discharge     Problem: CARDIOVASCULAR - ADULT  Goal: Maintains optimal cardiac output and hemodynamic stability  Description: INTERVENTIONS:- Monitor vital signs, rhythm, and trends- Monitor for bleeding, hypotension and signs of decreased cardiac output- Evaluate effectiveness of vasoactive medications to optimize hemodynamic stability- Monitor arterial and/or venous puncture sites for bleeding and/or hematoma- Assess quality of pulses, skin color and temperature- Assess for signs of decreased coronary artery perfusion - ex. Angina- Evaluate fluid balance, assess for edema, trend weights  Outcome: Adequate for Discharge  Goal: Absence of cardiac arrhythmias or at baseline  Description: INTERVENTIONS:- Continuous cardiac monitoring, monitor vital signs, obtain 12 lead EKG if indicated- Evaluate effectiveness of antiarrhythmic and heart rate control medications as ordered- Initiate emergency measures for life threatening arrhythmias- Monitor electrolytes and administer replacement therapy as ordered  Outcome: Adequate for Discharge     Problem: METABOLIC/FLUID AND ELECTROLYTES - ADULT  Goal: Electrolytes maintained within normal limits  Description: INTERVENTIONS:- Monitor labs and rhythm and assess patient for signs and symptoms of electrolyte imbalances- Administer electrolyte replacement as ordered- Monitor response to electrolyte replacements, including rhythm and repeat lab results as appropriate- Fluid restriction as ordered- Instruct patient on fluid and nutrition restrictions as appropriate  Outcome: Adequate for Discharge     Problem: SKIN/TISSUE INTEGRITY - ADULT  Goal: Skin integrity remains  intact  Description: INTERVENTIONS- Assess and document risk factors for pressure ulcer development- Assess and document skin integrity- Monitor for areas of redness and/or skin breakdown- Initiate interventions, skin care algorithm/standards of care as needed  Outcome: Adequate for Discharge

## (undated) NOTE — LETTER
[address]  [address]  Consent for Procedure/Sedation  Date: 04/24/2025         Time: 09:52    I hereby authorize Dr. Jose , my physician and his/her assistants (if applicable), which may include medical students, residents, and/or fellows, to perform the following surgical operation/ procedure and administer such anesthesia as may be determined necessary by my physician:  Operation/Procedure name (s)  Cardiac Catheterization, Left Ventricular Cineangiography, Bilateral Selective Coronary Angiography and/or Right Heart Catheterization; possible Percutaneous Transluminal Coronary Angioplasty, Coronary Atherectomy, Coronary Stent, Intracoronary Thrombolytic therapy, Antiplatelet therapy and/or Intravascular Ultrasound on Faustino Bell   2.   I recognize that during the surgical operation/procedure, unforeseen conditions may necessitate additional or different procedures than those listed above.  I, therefore, further authorize and request that the above-named surgeon, assistants, or designees perform such procedures as are, in their judgment, necessary and desirable.    3.   My surgeon/physician has discussed prior to my surgery the potential benefits, risks and side effects of this procedure; the likelihood of achieving goals; and potential problems that might occur during recuperation.  They also discussed reasonable alternatives to the procedure, including risks, benefits, and side effects related to the alternatives and risks related to not receiving this procedure.  I have had all my questions answered and I acknowledge that no guarantee has been made as to the result that may be obtained.    4.   Should the need arise during my operation/procedure, which includes change of level of care prior to discharge, I also consent to the administration of blood and/or blood products.  Further, I understand that despite careful testing and screening of blood or blood products by  collecting agencies, I may still be subject to ill effects as a result of receiving a blood transfusion and/or blood products.  The following are some, but not all, of the potential risks that can occur: fever and allergic reactions, hemolytic reactions, transmission of diseases such as Hepatitis, AIDS and Cytomegalovirus (CMV) and fluid overload.  In the event that I wish to have an autologous transfusion of my own blood, or a directed donor transfusion, I will discuss this with my physician.  Check only if Refusing Blood or Blood Products  I understand refusal of blood or blood products as deemed necessary by my physician may have serious consequences to my condition to include possible death. I hereby assume responsibility for my refusal and release the hospital, its personnel, and my physicians from any responsibility for the consequences of my refusal.          o  Refuse      5.   I authorize the use of any specimen, organs, tissues, body parts or foreign objects that may be removed from my body during the operation/procedure for diagnosis, research or teaching purposes and their subsequent disposal by hospital authorities.  I also authorize the release of specimen test results and/or written reports to my treating physician on the hospital medical staff or other referring or consulting physicians involved in my care, at the discretion of the Pathologist or my treating physician.    6.   I consent to the photographing or videotaping of the operations or procedures to be performed, including appropriate portions of my body for medical, scientific, or educational purposes, provided my identity is not revealed by the pictures or by descriptive texts accompanying them.  If the procedure has been photographed/videotaped, the surgeon will obtain the original picture, image, videotape or CD.  The hospital will not be responsible for storage, release or maintenance of the picture, image, tape or CD.    7.   I consent  to the presence of a  or observers in the operating room as deemed necessary by my physician or their designees.    8.   I recognize that in the event my procedure results in extended X-Ray/fluoroscopy time, I may develop a skin reaction.    9. If I have a Do Not Attempt Resuscitation (DNAR) order in place, that status will be suspended while in the operating room, procedural suite, and during the recovery period unless otherwise explicitly stated by me (or a person authorized to consent on my behalf). The surgeon or my attending physician will determine when the applicable recovery period ends for purposes of reinstating the DNAR order.  10. Patients having a sterilization procedure: I understand that if the procedure is successful the results will be permanent and it will therefore be impossible for me to inseminate, conceive, or bear children.  I also understand that the procedure is intended to result in sterility, although the result has not been guaranteed.   11. I acknowledge that my physician has explained sedation/analgesia administration to me including the risk and benefits I consent to the administration of sedation/analgesia as may be necessary or desirable in the judgment of my physician.    I CERTIFY THAT I HAVE READ AND FULLY UNDERSTAND THE ABOVE CONSENT TO OPERATION and/or OTHER PROCEDURE.      ____________________________________       _________________________________      ______________________________  Signature of Patient         Signature of Responsible Person        Printed Name of Responsible Person   ____________________________________      _________________________________      ______________________________       Signature of Witness          Relationship to Patient                       Date                                       Time  Patient Name: Faustino MALAGON Ray  : 1970    Reviewed: 2024   Printed: 2025  Medical Record #: [id number] Page 1 of 1

## (undated) NOTE — LETTER
[address]  [address]  Consent for Procedure/Sedation  Date: 4/24/25         Time: 11:45    I hereby authorize Dr. Penn, my physician and his/her assistants (if applicable), which may include medical students, residents, and/or fellows, to perform the following surgical operation/ procedure and administer such anesthesia as may be determined necessary by my physician:  Operation/Procedure name (s) Peripheral angiography, atherectomy, percutaneous transluminal angioplasty (PTA) and/or vascular stenting, Thrombectomy/Thrombolysis on Faustino Bell  2.   I recognize that during the surgical operation/procedure, unforeseen conditions may necessitate additional or different procedures than those listed above.  I, therefore, further authorize and request that the above-named surgeon, assistants, or designees perform such procedures as are, in their judgment, necessary and desirable.    3.   My surgeon/physician has discussed prior to my surgery the potential benefits, risks and side effects of this procedure; the likelihood of achieving goals; and potential problems that might occur during recuperation.  They also discussed reasonable alternatives to the procedure, including risks, benefits, and side effects related to the alternatives and risks related to not receiving this procedure.  I have had all my questions answered and I acknowledge that no guarantee has been made as to the result that may be obtained.    4.   Should the need arise during my operation/procedure, which includes change of level of care prior to discharge, I also consent to the administration of blood and/or blood products.  Further, I understand that despite careful testing and screening of blood or blood products by collecting agencies, I may still be subject to ill effects as a result of receiving a blood transfusion and/or blood products.  The following are some, but not all, of the potential risks that can  occur: fever and allergic reactions, hemolytic reactions, transmission of diseases such as Hepatitis, AIDS and Cytomegalovirus (CMV) and fluid overload.  In the event that I wish to have an autologous transfusion of my own blood, or a directed donor transfusion, I will discuss this with my physician.     Check only if Refusing Blood or Blood Products  I understand refusal of blood or blood products as deemed necessary by my physician may have serious consequences to my condition to include possible death. I hereby assume responsibility for my refusal and release the hospital, its personnel, and my physicians from any responsibility for the consequences of my refusal.      o  Refuse        5.   I authorize the use of any specimen, organs, tissues, body parts or foreign objects that may be removed from my body during the operation/procedure for diagnosis, research or teaching purposes and their subsequent disposal by hospital authorities.  I also authorize the release of specimen test results and/or written reports to my treating physician on the hospital medical staff or other referring or consulting physicians involved in my care, at the discretion of the Pathologist or my treating physician.    6.   I consent to the photographing or videotaping of the operations or procedures to be performed, including appropriate portions of my body for medical, scientific, or educational purposes, provided my identity is not revealed by the pictures or by descriptive texts accompanying them.  If the procedure has been photographed/videotaped, the surgeon will obtain the original picture, image, videotape or CD.  The hospital will not be responsible for storage, release or maintenance of the picture, image, tape or CD.    7.   I consent to the presence of a  or observers in the operating room as deemed necessary by my physician or their designees.    8.   I recognize that in the event my procedure results in  extended X-Ray/fluoroscopy time, I may develop a skin reaction.    9. If I have a Do Not Attempt Resuscitation (DNAR) order in place, that status will be suspended while in the operating room, procedural suite, and during the recovery period unless otherwise explicitly stated by me (or a person authorized to consent on my behalf). The surgeon or my attending physician will determine when the applicable recovery period ends for purposes of reinstating the DNAR order.  10. Patients having a sterilization procedure: I understand that if the procedure is successful the results will be permanent and it will therefore be impossible for me to inseminate, conceive, or bear children.  I also understand that the procedure is intended to result in sterility, although the result has not been guaranteed.   11. I acknowledge that my physician has explained sedation/analgesia administration to me including the risk and benefits I consent to the administration of sedation/analgesia as may be necessary or desirable in the judgment of my physician.    I CERTIFY THAT I HAVE READ AND FULLY UNDERSTAND THE ABOVE CONSENT TO OPERATION and/or OTHER PROCEDURE.      ____________________________________       _________________________________      ______________________________  Signature of Patient         Signature of Responsible Person        Printed Name of Responsible Person    ____________________________________      _________________________________      ______________________________       Signature of Witness          Relationship to Patient                       Date                                         Time  Patient Name: Faustino Bell  : 1970    Reviewed: 2024   Printed: 2025  Medical Record #: [id number] Page 1 of 1